# Patient Record
Sex: MALE | Race: WHITE | NOT HISPANIC OR LATINO | URBAN - METROPOLITAN AREA
[De-identification: names, ages, dates, MRNs, and addresses within clinical notes are randomized per-mention and may not be internally consistent; named-entity substitution may affect disease eponyms.]

---

## 2019-02-03 ENCOUNTER — INPATIENT (INPATIENT)
Facility: HOSPITAL | Age: 79
LOS: 0 days | Discharge: HOME | DRG: 872 | End: 2019-02-04
Attending: INTERNAL MEDICINE | Admitting: INTERNAL MEDICINE
Payer: MEDICARE

## 2019-02-03 VITALS
HEIGHT: 64 IN | SYSTOLIC BLOOD PRESSURE: 130 MMHG | HEART RATE: 104 BPM | DIASTOLIC BLOOD PRESSURE: 72 MMHG | RESPIRATION RATE: 15 BRPM | TEMPERATURE: 97 F | OXYGEN SATURATION: 100 % | WEIGHT: 164.91 LBS

## 2019-02-03 DIAGNOSIS — Z29.9 ENCOUNTER FOR PROPHYLACTIC MEASURES, UNSPECIFIED: ICD-10-CM

## 2019-02-03 DIAGNOSIS — E78.5 HYPERLIPIDEMIA, UNSPECIFIED: ICD-10-CM

## 2019-02-03 DIAGNOSIS — K40.90 UNILATERAL INGUINAL HERNIA, WITHOUT OBSTRUCTION OR GANGRENE, NOT SPECIFIED AS RECURRENT: ICD-10-CM

## 2019-02-03 DIAGNOSIS — A41.9 SEPSIS, UNSPECIFIED ORGANISM: ICD-10-CM

## 2019-02-03 DIAGNOSIS — I10 ESSENTIAL (PRIMARY) HYPERTENSION: ICD-10-CM

## 2019-02-03 DIAGNOSIS — K61.0 ANAL ABSCESS: ICD-10-CM

## 2019-02-03 LAB
ALBUMIN SERPL ELPH-MCNC: 2.9 G/DL — LOW (ref 3.3–5)
ALP SERPL-CCNC: 128 U/L — HIGH (ref 40–120)
ALT FLD-CCNC: 30 U/L — SIGNIFICANT CHANGE UP (ref 12–78)
ANION GAP SERPL CALC-SCNC: 9 MMOL/L — SIGNIFICANT CHANGE UP (ref 5–17)
APTT BLD: 32.1 SEC — SIGNIFICANT CHANGE UP (ref 28.5–37)
AST SERPL-CCNC: 27 U/L — SIGNIFICANT CHANGE UP (ref 15–37)
BASOPHILS # BLD AUTO: 0.03 K/UL — SIGNIFICANT CHANGE UP (ref 0–0.2)
BASOPHILS NFR BLD AUTO: 0.2 % — SIGNIFICANT CHANGE UP (ref 0–2)
BILIRUB SERPL-MCNC: 1 MG/DL — SIGNIFICANT CHANGE UP (ref 0.2–1.2)
BUN SERPL-MCNC: 21 MG/DL — SIGNIFICANT CHANGE UP (ref 7–23)
CALCIUM SERPL-MCNC: 8 MG/DL — LOW (ref 8.5–10.1)
CHLORIDE SERPL-SCNC: 105 MMOL/L — SIGNIFICANT CHANGE UP (ref 96–108)
CO2 SERPL-SCNC: 24 MMOL/L — SIGNIFICANT CHANGE UP (ref 22–31)
CREAT SERPL-MCNC: 1.1 MG/DL — SIGNIFICANT CHANGE UP (ref 0.5–1.3)
EOSINOPHIL # BLD AUTO: 0.02 K/UL — SIGNIFICANT CHANGE UP (ref 0–0.5)
EOSINOPHIL NFR BLD AUTO: 0.1 % — SIGNIFICANT CHANGE UP (ref 0–6)
GLUCOSE SERPL-MCNC: 142 MG/DL — HIGH (ref 70–99)
HCT VFR BLD CALC: 48.1 % — SIGNIFICANT CHANGE UP (ref 39–50)
HGB BLD-MCNC: 15.8 G/DL — SIGNIFICANT CHANGE UP (ref 13–17)
IMM GRANULOCYTES NFR BLD AUTO: 0.4 % — SIGNIFICANT CHANGE UP (ref 0–1.5)
INR BLD: 1.32 RATIO — HIGH (ref 0.88–1.16)
LACTATE SERPL-SCNC: 0.9 MMOL/L — SIGNIFICANT CHANGE UP (ref 0.7–2)
LIDOCAIN IGE QN: 157 U/L — SIGNIFICANT CHANGE UP (ref 73–393)
LYMPHOCYTES # BLD AUTO: 0.88 K/UL — LOW (ref 1–3.3)
LYMPHOCYTES # BLD AUTO: 5.2 % — LOW (ref 13–44)
MCHC RBC-ENTMCNC: 26.8 PG — LOW (ref 27–34)
MCHC RBC-ENTMCNC: 32.8 GM/DL — SIGNIFICANT CHANGE UP (ref 32–36)
MCV RBC AUTO: 81.5 FL — SIGNIFICANT CHANGE UP (ref 80–100)
MONOCYTES # BLD AUTO: 0.79 K/UL — SIGNIFICANT CHANGE UP (ref 0–0.9)
MONOCYTES NFR BLD AUTO: 4.6 % — SIGNIFICANT CHANGE UP (ref 2–14)
NEUTROPHILS # BLD AUTO: 15.24 K/UL — HIGH (ref 1.8–7.4)
NEUTROPHILS NFR BLD AUTO: 89.5 % — HIGH (ref 43–77)
PLATELET # BLD AUTO: 214 K/UL — SIGNIFICANT CHANGE UP (ref 150–400)
POTASSIUM SERPL-MCNC: 3.7 MMOL/L — SIGNIFICANT CHANGE UP (ref 3.5–5.3)
POTASSIUM SERPL-SCNC: 3.7 MMOL/L — SIGNIFICANT CHANGE UP (ref 3.5–5.3)
PROCALCITONIN SERPL-MCNC: 0.5 NG/ML — HIGH (ref 0–0.04)
PROT SERPL-MCNC: 6.6 G/DL — SIGNIFICANT CHANGE UP (ref 6–8.3)
PROTHROM AB SERPL-ACNC: 15.2 SEC — HIGH (ref 10–12.9)
RBC # BLD: 5.9 M/UL — HIGH (ref 4.2–5.8)
RBC # FLD: 13.3 % — SIGNIFICANT CHANGE UP (ref 10.3–14.5)
SODIUM SERPL-SCNC: 138 MMOL/L — SIGNIFICANT CHANGE UP (ref 135–145)
WBC # BLD: 17.03 K/UL — HIGH (ref 3.8–10.5)
WBC # FLD AUTO: 17.03 K/UL — HIGH (ref 3.8–10.5)

## 2019-02-03 PROCEDURE — 99285 EMERGENCY DEPT VISIT HI MDM: CPT

## 2019-02-03 PROCEDURE — 74177 CT ABD & PELVIS W/CONTRAST: CPT | Mod: 26

## 2019-02-03 PROCEDURE — 93010 ELECTROCARDIOGRAM REPORT: CPT

## 2019-02-03 RX ORDER — SIMVASTATIN 20 MG/1
1 TABLET, FILM COATED ORAL
Qty: 0 | Refills: 0 | COMMUNITY

## 2019-02-03 RX ORDER — SODIUM CHLORIDE 9 MG/ML
1000 INJECTION INTRAMUSCULAR; INTRAVENOUS; SUBCUTANEOUS ONCE
Qty: 0 | Refills: 0 | Status: COMPLETED | OUTPATIENT
Start: 2019-02-03 | End: 2019-02-03

## 2019-02-03 RX ORDER — VANCOMYCIN HCL 1 G
1500 VIAL (EA) INTRAVENOUS ONCE
Qty: 0 | Refills: 0 | Status: COMPLETED | OUTPATIENT
Start: 2019-02-03 | End: 2019-02-03

## 2019-02-03 RX ORDER — PIPERACILLIN AND TAZOBACTAM 4; .5 G/20ML; G/20ML
3.38 INJECTION, POWDER, LYOPHILIZED, FOR SOLUTION INTRAVENOUS ONCE
Qty: 0 | Refills: 0 | Status: COMPLETED | OUTPATIENT
Start: 2019-02-03 | End: 2019-02-03

## 2019-02-03 RX ORDER — HYDROMORPHONE HYDROCHLORIDE 2 MG/ML
0.5 INJECTION INTRAMUSCULAR; INTRAVENOUS; SUBCUTANEOUS ONCE
Qty: 0 | Refills: 0 | Status: DISCONTINUED | OUTPATIENT
Start: 2019-02-03 | End: 2019-02-03

## 2019-02-03 RX ORDER — SODIUM CHLORIDE 9 MG/ML
3 INJECTION INTRAMUSCULAR; INTRAVENOUS; SUBCUTANEOUS ONCE
Qty: 0 | Refills: 0 | Status: COMPLETED | OUTPATIENT
Start: 2019-02-03 | End: 2019-02-03

## 2019-02-03 RX ORDER — SIMVASTATIN 20 MG/1
20 TABLET, FILM COATED ORAL AT BEDTIME
Qty: 0 | Refills: 0 | Status: DISCONTINUED | OUTPATIENT
Start: 2019-02-03 | End: 2019-02-04

## 2019-02-03 RX ORDER — AMLODIPINE BESYLATE 2.5 MG/1
1 TABLET ORAL
Qty: 0 | Refills: 0 | COMMUNITY

## 2019-02-03 RX ORDER — DOCUSATE SODIUM 100 MG
100 CAPSULE ORAL THREE TIMES A DAY
Qty: 0 | Refills: 0 | Status: DISCONTINUED | OUTPATIENT
Start: 2019-02-03 | End: 2019-02-04

## 2019-02-03 RX ORDER — SENNA PLUS 8.6 MG/1
2 TABLET ORAL AT BEDTIME
Qty: 0 | Refills: 0 | Status: DISCONTINUED | OUTPATIENT
Start: 2019-02-03 | End: 2019-02-04

## 2019-02-03 RX ORDER — ENOXAPARIN SODIUM 100 MG/ML
40 INJECTION SUBCUTANEOUS EVERY 24 HOURS
Qty: 0 | Refills: 0 | Status: DISCONTINUED | OUTPATIENT
Start: 2019-02-03 | End: 2019-02-04

## 2019-02-03 RX ORDER — AMLODIPINE BESYLATE 2.5 MG/1
5 TABLET ORAL DAILY
Qty: 0 | Refills: 0 | Status: DISCONTINUED | OUTPATIENT
Start: 2019-02-03 | End: 2019-02-04

## 2019-02-03 RX ADMIN — SODIUM CHLORIDE 1000 MILLILITER(S): 9 INJECTION INTRAMUSCULAR; INTRAVENOUS; SUBCUTANEOUS at 15:30

## 2019-02-03 RX ADMIN — Medication 300 MILLIGRAM(S): at 15:50

## 2019-02-03 RX ADMIN — HYDROMORPHONE HYDROCHLORIDE 0.5 MILLIGRAM(S): 2 INJECTION INTRAMUSCULAR; INTRAVENOUS; SUBCUTANEOUS at 14:55

## 2019-02-03 RX ADMIN — PIPERACILLIN AND TAZOBACTAM 3.38 GRAM(S): 4; .5 INJECTION, POWDER, LYOPHILIZED, FOR SOLUTION INTRAVENOUS at 15:30

## 2019-02-03 RX ADMIN — SODIUM CHLORIDE 1000 MILLILITER(S): 9 INJECTION INTRAMUSCULAR; INTRAVENOUS; SUBCUTANEOUS at 16:50

## 2019-02-03 RX ADMIN — PIPERACILLIN AND TAZOBACTAM 200 GRAM(S): 4; .5 INJECTION, POWDER, LYOPHILIZED, FOR SOLUTION INTRAVENOUS at 14:55

## 2019-02-03 RX ADMIN — Medication 1500 MILLIGRAM(S): at 18:45

## 2019-02-03 RX ADMIN — SIMVASTATIN 20 MILLIGRAM(S): 20 TABLET, FILM COATED ORAL at 21:34

## 2019-02-03 RX ADMIN — Medication 100 MILLIGRAM(S): at 22:03

## 2019-02-03 RX ADMIN — SODIUM CHLORIDE 1000 MILLILITER(S): 9 INJECTION INTRAMUSCULAR; INTRAVENOUS; SUBCUTANEOUS at 14:30

## 2019-02-03 RX ADMIN — SODIUM CHLORIDE 3 MILLILITER(S): 9 INJECTION INTRAMUSCULAR; INTRAVENOUS; SUBCUTANEOUS at 14:15

## 2019-02-03 RX ADMIN — HYDROMORPHONE HYDROCHLORIDE 0.5 MILLIGRAM(S): 2 INJECTION INTRAMUSCULAR; INTRAVENOUS; SUBCUTANEOUS at 14:34

## 2019-02-03 NOTE — ED ADULT NURSE REASSESSMENT NOTE - NS ED NURSE REASSESS COMMENT FT1
In CT scan, while sliding from stretcher to CT table, the abscess on pt's buttock opened.  Wound area now much less reddened with copious amounts of purulent drainage out.  Area much less tender to palpation.

## 2019-02-03 NOTE — H&P ADULT - PROBLEM SELECTOR PLAN 1
- WBC 17.03,  on admission.  - Lactate 0.9.  - Patient remained mildly tachycardic in ED.  - s/p 2L NS, IV vancomycin, IV zosyn.   - Likely secondary to acute perianal abscess infection.  - Admit to [facility] for sepsis, perianal abscess.  - Follow up BCx, UCx.

## 2019-02-03 NOTE — H&P ADULT - PROBLEM SELECTOR PLAN 3
- Chronic, stable.  - Continue home Norvasc with hold parameters. - Incidental finding.  - CT abd/pelvis: Fat-containing bilateral inguinal hernias.  - Follow up outpatient with PMD.

## 2019-02-03 NOTE — H&P ADULT - PROBLEM SELECTOR PLAN 2
- Acute, draining perianal abscess.  - s/p IV vancomycin, IV zosyn in ED.  - Continue therapy with IV Augmentin.  - IV abx therapy with IV Augmentin.  - Colorectal surgeon Dr. Espitia consulted.   - Consider ID consult. - Acute, draining perianal abscess.  - CT abd/pelvis: There is a 1.7 x 3.2 x 3.1 cm left perianal abscess. No supralevator or ischiorectal fossae extension. No obvious fistula. No convincing focal bowel wall thickening.  - s/p IV vancomycin, IV zosyn in ED.  - Continue therapy with IV Augmentin.  - IV abx therapy with IV Augmentin.  - Colorectal surgeon Dr. Espitia consulted.   - Consider ID consult.

## 2019-02-03 NOTE — ED ADULT NURSE NOTE - OBJECTIVE STATEMENT
Pt A&Ox4, ambulatory to ED, c/o pain in rectal area.  Pt states that he has swelling and pain for the past 2 days with fever.  Pt has area of redness, firm swelling, and tenderness in under left buttock and rectal area. Pt A&Ox4, ambulatory to ED, c/o pain in rectal area.  Pt states that he has swelling and pain for the past 2 days with fever.  Pt has area of redness, firm swelling, and tenderness in under left buttock and rectal area.  At time of this initial assessment, pt has no other open wounds or non-blanchable redness to buttocks or sacral area.

## 2019-02-03 NOTE — H&P ADULT - NSHPREVIEWOFSYSTEMS_GEN_ALL_CORE
Constitutional: + fever, difficulty sitting, gluteal pain; denies chills, general malaise  HEENT: Denies sore throat, runny nose, blurry vision  Respiratory: Denies shortness of breath, dyspnea on exertion, cough  Cardiovascular: Denies chest pain, palpitations, edema  Gastrointestinal: Denies nausea, vomiting, diarrhea, constipation, abdominal pain  Genitourinary: Denies dysuria, hematuria  Skin/Breast: + left-sided perianal abscess with drainage  Musculoskeletal: Denies muscle pains, muscle weakness, joint pain or swelling  Neurologic: Denies syncope, loss of consciousness, headache, dizziness   ROS negative except as noted above

## 2019-02-03 NOTE — ED ADULT NURSE NOTE - ED STAT RN HANDOFF DETAILS
called [facility] for report, KEVON Pichardo to come to ED in 30 minutes called [facility] for report, KEVON Vasquez to come to ED in 30 minutes

## 2019-02-03 NOTE — ED PROVIDER NOTE - OBJECTIVE STATEMENT
Pt is a 77 yo male who presents to the ED with a cc of fever and gluteal pain.  PMHx of HTN, HLD.  Pt reports that he developed rectal pain approx 2 days ago and he states that the pain has progressively worsened making it difficult for him to sit.  He further reports associated fevers T max of 100.3 with and episode of nausea that has since resolved.  Denies chills, V/D/C, CP, SOB, abd pain, ext numbness or weakness.  Denies any similar episodes of pain.  He reports that he took Aleve around midnight but has not taken anything else for pain or fever.  He has not followed with his PMD for this.

## 2019-02-03 NOTE — H&P ADULT - PROBLEM SELECTOR PLAN 5
IMPROVE VTE Individual Risk Assessment          RISK                                                          Points  [  ] Previous VTE                                                3  [  ] Thrombophilia                                             2  [  ] Lower limb paralysis                                   2        (unable to hold up >15 seconds)    [  ] Current Cancer                                             2         (within 6 months)  [  ] Immobilization > 24 hrs                              1  [  ] ICU/CCU stay > 24 hours                             1  [ x ] Age > 60                                                         1    IMPROVE VTE Score: 1  Lovenox 40 subq daily. - Chronic, stable.  - Continue home statin.

## 2019-02-03 NOTE — H&P ADULT - PROBLEM SELECTOR PLAN 4
- Chronic, stable.  - Continue home statin. - Chronic, stable.  - Continue home Norvasc with hold parameters.

## 2019-02-03 NOTE — ED PROVIDER NOTE - PROGRESS NOTE DETAILS
Abscess has spontaneously opened at bedside and is draining with significant improvement in pain, and redness.  Spoke with Dr. Espitia who will see pt in am.  Will admit for continued abx

## 2019-02-03 NOTE — ED PROVIDER NOTE - MEDICAL DECISION MAKING DETAILS
Pt is a 79 yo male who presents to the ED with a cc of fever and gluteal pain.  PMHx of HTN, HLD.  Pt reports that he developed rectal pain approx 2 days ago and he states that the pain has progressively worsened making it difficult for him to sit.  Concern for possible perianal vs perirectal abscess.  Will obtain screening septic work up, EKG, CT abd/pelvis.  Will begin abx

## 2019-02-03 NOTE — H&P ADULT - HISTORY OF PRESENT ILLNESS
Patient is a 78 year old male with PMH of HTN, HLD who presents to the ED with complaints of perianal pain and fever. Patient states he began to feel unwell on Friday states that the pain has progressively worsened making it difficult for him to sit. He reports max temp of 100.3 and had an episode of nausea that has resolved. He denies chills, vomiting, abdominal pain, diarrhea, constipation, chest pain. He states he took Aleve around midnight but has not taken anything else for pain or fever.    Vitals in the ED  T(C): 37.3 (03 Feb 2019 19:55), Max: 37.3 (03 Feb 2019 19:55)  T(F): 99.1 (03 Feb 2019 19:55), Max: 99.1 (03 Feb 2019 19:55)  HR: 90 (03 Feb 2019 19:55) (87 - 104)  BP: 117/65 (03 Feb 2019 19:55) (117/65 - 130/72)  RR: 16 (03 Feb 2019 19:55) (15 - 16)  SpO2: 96% (03 Feb 2019 19:55) (96% - 100%)    Pertinent labs: 17.03, glucose 142, Ca 8, alk phos 128, procal 0.5.    CT abdomen/pelvis: There is a 1.7 x 3.2 x 3.1 cm left perianal abscess. No supralevator or ischiorectal fossae extension. No obvious fistula. No convincing focal bowel wall thickening.    In the ED, patient received: IV vancomycin, IV zosyn, Dilaudid 2L NS.     Patient admitted to  for sepsis, perianal abscess.

## 2019-02-03 NOTE — ED PROVIDER NOTE - PHYSICAL EXAMINATION
Left gluteal region: diffuse erythema with extensive induration and diffuse TTP to region, pt refusing rectal at this time secondary to pain

## 2019-02-03 NOTE — ED ADULT NURSE REASSESSMENT NOTE - NS ED NURSE REASSESS COMMENT FT1
Pt placed on hospital bed for comfort, moved to T4 and entire area made private, pt given 4 pillows, warm blanket.  Apologized again for mix-up with bed assignment.  Call bell within reach.

## 2019-02-03 NOTE — H&P ADULT - NSHPSOCIALHISTORY_GEN_ALL_CORE
Patient is  and lives at home with wife.   Ambulates without assistance.  Denies tobacco, alcohol, or drug use.

## 2019-02-03 NOTE — H&P ADULT - PROBLEM SELECTOR PLAN 6
IMPROVE VTE Individual Risk Assessment          RISK                                                          Points  [  ] Previous VTE                                                3  [  ] Thrombophilia                                             2  [  ] Lower limb paralysis                                   2        (unable to hold up >15 seconds)    [  ] Current Cancer                                             2         (within 6 months)  [  ] Immobilization > 24 hrs                              1  [  ] ICU/CCU stay > 24 hours                             1  [ x ] Age > 60                                                         1    IMPROVE VTE Score: 1  Lovenox 40 subq daily.

## 2019-02-03 NOTE — ED ADULT NURSE NOTE - ED STAT RN HANDOFF DETAILS 2
RN Pedro to ED, bedside report given but due to hospital needs, pt's bed cancelled; apologized to pt, explained situation

## 2019-02-03 NOTE — H&P ADULT - NSHPPHYSICALEXAM_GEN_ALL_CORE
Physical Exam:  General: well-developed, well-nourished, NAD  HEENT: normocephalic, atraumatic, EOMI, moist mucous membranes   Neck: supple, non-tender, no masses  Neurology: AAOx3, sensation intact  Respiratory: clear to auscultation bilaterally; no wheezes, rhonchi, or rales  CV: regular rate and rhythm, soft S1/S2, no murmurs, rubs, or gallops  Abdominal: soft, non-tender, non-distended, bowel sounds present   Extremities: no clubbing, cyanosis, or edema  Musculoskeletal: no joint erythema or warmth, no joint swelling   Skin: warm, dry, normal color; left perianal draining abscess

## 2019-02-03 NOTE — ED ADULT NURSE NOTE - NSIMPLEMENTINTERV_GEN_ALL_ED
Implemented All Universal Safety Interventions:  [place] to call system. Call bell, personal items and telephone within reach. Instruct patient to call for assistance. Room bathroom lighting operational. Non-slip footwear when patient is off stretcher. Physically safe environment: no spills, clutter or unnecessary equipment. Stretcher in lowest position, wheels locked, appropriate side rails in place.

## 2019-02-04 ENCOUNTER — TRANSCRIPTION ENCOUNTER (OUTPATIENT)
Age: 79
End: 2019-02-04

## 2019-02-04 VITALS
RESPIRATION RATE: 16 BRPM | HEART RATE: 77 BPM | DIASTOLIC BLOOD PRESSURE: 71 MMHG | TEMPERATURE: 98 F | SYSTOLIC BLOOD PRESSURE: 120 MMHG | OXYGEN SATURATION: 94 %

## 2019-02-04 DIAGNOSIS — E87.6 HYPOKALEMIA: ICD-10-CM

## 2019-02-04 LAB
ALBUMIN SERPL ELPH-MCNC: 2.8 G/DL — LOW (ref 3.3–5)
ALP SERPL-CCNC: 155 U/L — HIGH (ref 40–120)
ALT FLD-CCNC: 63 U/L — SIGNIFICANT CHANGE UP (ref 12–78)
ANION GAP SERPL CALC-SCNC: 7 MMOL/L — SIGNIFICANT CHANGE UP (ref 5–17)
APPEARANCE UR: ABNORMAL
AST SERPL-CCNC: 79 U/L — HIGH (ref 15–37)
BILIRUB SERPL-MCNC: 0.6 MG/DL — SIGNIFICANT CHANGE UP (ref 0.2–1.2)
BILIRUB UR-MCNC: ABNORMAL
BUN SERPL-MCNC: 14 MG/DL — SIGNIFICANT CHANGE UP (ref 7–23)
CALCIUM SERPL-MCNC: 8.3 MG/DL — LOW (ref 8.5–10.1)
CHLORIDE SERPL-SCNC: 106 MMOL/L — SIGNIFICANT CHANGE UP (ref 96–108)
CO2 SERPL-SCNC: 26 MMOL/L — SIGNIFICANT CHANGE UP (ref 22–31)
COLOR SPEC: YELLOW — SIGNIFICANT CHANGE UP
CREAT SERPL-MCNC: 0.77 MG/DL — SIGNIFICANT CHANGE UP (ref 0.5–1.3)
DIFF PNL FLD: ABNORMAL
GLUCOSE SERPL-MCNC: 132 MG/DL — HIGH (ref 70–99)
GLUCOSE UR QL: NEGATIVE — SIGNIFICANT CHANGE UP
HCT VFR BLD CALC: 39.1 % — SIGNIFICANT CHANGE UP (ref 39–50)
HGB BLD-MCNC: 12.8 G/DL — LOW (ref 13–17)
KETONES UR-MCNC: ABNORMAL
LEUKOCYTE ESTERASE UR-ACNC: ABNORMAL
MAGNESIUM SERPL-MCNC: 2.3 MG/DL — SIGNIFICANT CHANGE UP (ref 1.6–2.6)
MCHC RBC-ENTMCNC: 26.7 PG — LOW (ref 27–34)
MCHC RBC-ENTMCNC: 32.7 GM/DL — SIGNIFICANT CHANGE UP (ref 32–36)
MCV RBC AUTO: 81.6 FL — SIGNIFICANT CHANGE UP (ref 80–100)
NITRITE UR-MCNC: NEGATIVE — SIGNIFICANT CHANGE UP
NRBC # BLD: 0 /100 WBCS — SIGNIFICANT CHANGE UP (ref 0–0)
PH UR: 6.5 — SIGNIFICANT CHANGE UP (ref 5–8)
PHOSPHATE SERPL-MCNC: 2.2 MG/DL — LOW (ref 2.5–4.5)
PLATELET # BLD AUTO: 199 K/UL — SIGNIFICANT CHANGE UP (ref 150–400)
POTASSIUM SERPL-MCNC: 3.4 MMOL/L — LOW (ref 3.5–5.3)
POTASSIUM SERPL-SCNC: 3.4 MMOL/L — LOW (ref 3.5–5.3)
PROT SERPL-MCNC: 6.5 G/DL — SIGNIFICANT CHANGE UP (ref 6–8.3)
PROT UR-MCNC: 150 MG/DL
RBC # BLD: 4.79 M/UL — SIGNIFICANT CHANGE UP (ref 4.2–5.8)
RBC # FLD: 13.2 % — SIGNIFICANT CHANGE UP (ref 10.3–14.5)
SODIUM SERPL-SCNC: 139 MMOL/L — SIGNIFICANT CHANGE UP (ref 135–145)
SP GR SPEC: 1.02 — SIGNIFICANT CHANGE UP (ref 1.01–1.02)
UROBILINOGEN FLD QL: 8
WBC # BLD: 10.77 K/UL — HIGH (ref 3.8–10.5)
WBC # FLD AUTO: 10.77 K/UL — HIGH (ref 3.8–10.5)

## 2019-02-04 PROCEDURE — 83690 ASSAY OF LIPASE: CPT

## 2019-02-04 PROCEDURE — 86850 RBC ANTIBODY SCREEN: CPT

## 2019-02-04 PROCEDURE — 86901 BLOOD TYPING SEROLOGIC RH(D): CPT

## 2019-02-04 PROCEDURE — 80053 COMPREHEN METABOLIC PANEL: CPT

## 2019-02-04 PROCEDURE — 81001 URINALYSIS AUTO W/SCOPE: CPT

## 2019-02-04 PROCEDURE — 96365 THER/PROPH/DIAG IV INF INIT: CPT | Mod: XU

## 2019-02-04 PROCEDURE — 87086 URINE CULTURE/COLONY COUNT: CPT

## 2019-02-04 PROCEDURE — 96375 TX/PRO/DX INJ NEW DRUG ADDON: CPT

## 2019-02-04 PROCEDURE — 84145 PROCALCITONIN (PCT): CPT

## 2019-02-04 PROCEDURE — 93005 ELECTROCARDIOGRAM TRACING: CPT

## 2019-02-04 PROCEDURE — 96367 TX/PROPH/DG ADDL SEQ IV INF: CPT

## 2019-02-04 PROCEDURE — 84100 ASSAY OF PHOSPHORUS: CPT

## 2019-02-04 PROCEDURE — 85730 THROMBOPLASTIN TIME PARTIAL: CPT

## 2019-02-04 PROCEDURE — 85027 COMPLETE CBC AUTOMATED: CPT

## 2019-02-04 PROCEDURE — 83735 ASSAY OF MAGNESIUM: CPT

## 2019-02-04 PROCEDURE — 36415 COLL VENOUS BLD VENIPUNCTURE: CPT

## 2019-02-04 PROCEDURE — 99285 EMERGENCY DEPT VISIT HI MDM: CPT | Mod: 25

## 2019-02-04 PROCEDURE — 74177 CT ABD & PELVIS W/CONTRAST: CPT

## 2019-02-04 PROCEDURE — 87040 BLOOD CULTURE FOR BACTERIA: CPT

## 2019-02-04 PROCEDURE — 83605 ASSAY OF LACTIC ACID: CPT

## 2019-02-04 PROCEDURE — 85610 PROTHROMBIN TIME: CPT

## 2019-02-04 PROCEDURE — 86900 BLOOD TYPING SEROLOGIC ABO: CPT

## 2019-02-04 RX ORDER — METRONIDAZOLE 500 MG
1 TABLET ORAL
Qty: 21 | Refills: 0 | OUTPATIENT
Start: 2019-02-04

## 2019-02-04 RX ORDER — LACTOBACILLUS ACIDOPHILUS 100MM CELL
1 CAPSULE ORAL
Qty: 21 | Refills: 0 | OUTPATIENT
Start: 2019-02-04 | End: 2019-02-10

## 2019-02-04 RX ORDER — CIPROFLOXACIN LACTATE 400MG/40ML
400 VIAL (ML) INTRAVENOUS EVERY 12 HOURS
Qty: 0 | Refills: 0 | Status: DISCONTINUED | OUTPATIENT
Start: 2019-02-04 | End: 2019-02-04

## 2019-02-04 RX ORDER — POTASSIUM CHLORIDE 20 MEQ
40 PACKET (EA) ORAL ONCE
Qty: 0 | Refills: 0 | Status: COMPLETED | OUTPATIENT
Start: 2019-02-04 | End: 2019-02-04

## 2019-02-04 RX ORDER — METRONIDAZOLE 500 MG
TABLET ORAL
Qty: 0 | Refills: 0 | Status: DISCONTINUED | OUTPATIENT
Start: 2019-02-04 | End: 2019-02-04

## 2019-02-04 RX ORDER — METRONIDAZOLE 500 MG
500 TABLET ORAL ONCE
Qty: 0 | Refills: 0 | Status: COMPLETED | OUTPATIENT
Start: 2019-02-04 | End: 2019-02-04

## 2019-02-04 RX ORDER — MOXIFLOXACIN HYDROCHLORIDE TABLETS, 400 MG 400 MG/1
1 TABLET, FILM COATED ORAL
Qty: 14 | Refills: 0 | OUTPATIENT
Start: 2019-02-04

## 2019-02-04 RX ORDER — LACTOBACILLUS ACIDOPHILUS 100MM CELL
1 CAPSULE ORAL
Qty: 0 | Refills: 0 | Status: DISCONTINUED | OUTPATIENT
Start: 2019-02-04 | End: 2019-02-04

## 2019-02-04 RX ORDER — METRONIDAZOLE 500 MG
500 TABLET ORAL EVERY 8 HOURS
Qty: 0 | Refills: 0 | Status: DISCONTINUED | OUTPATIENT
Start: 2019-02-04 | End: 2019-02-04

## 2019-02-04 RX ADMIN — Medication 100 MILLIGRAM(S): at 05:37

## 2019-02-04 RX ADMIN — Medication 100 MILLIGRAM(S): at 16:01

## 2019-02-04 RX ADMIN — Medication 100 MILLIGRAM(S): at 10:54

## 2019-02-04 RX ADMIN — Medication 200 MILLIGRAM(S): at 11:53

## 2019-02-04 RX ADMIN — Medication 40 MILLIEQUIVALENT(S): at 12:46

## 2019-02-04 RX ADMIN — ENOXAPARIN SODIUM 40 MILLIGRAM(S): 100 INJECTION SUBCUTANEOUS at 05:37

## 2019-02-04 RX ADMIN — AMLODIPINE BESYLATE 5 MILLIGRAM(S): 2.5 TABLET ORAL at 05:36

## 2019-02-04 NOTE — PATIENT PROFILE ADULT - ABILITY TO HEAR (WITH HEARING AID OR HEARING APPLIANCE IF NORMALLY USED):
Mildly to Moderately Impaired: difficulty hearing in some environments or speaker may need to increase volume or speak distinctly/Mild [place] right

## 2019-02-04 NOTE — DISCHARGE NOTE ADULT - CARE PROVIDER_API CALL
Dale Espitia)  ColonRectal Surgery; Surgery  [address] [facility] [address] [place] [address]  Phone: [phone]  Fax: [phone]    Vidal Ryan)  [address] [facility], [place] [address]  Phone: [phone]  Fax: (

## 2019-02-04 NOTE — PROGRESS NOTE ADULT - ASSESSMENT
Patient is a 78 year old male with PMH of HTN, HLD who presents to the ED with complaints of perianal pain and fever. Admitted to [facility] for sepsis and perianal abscess.

## 2019-02-04 NOTE — DISCHARGE NOTE ADULT - MEDICATION SUMMARY - MEDICATIONS TO TAKE
I will START or STAY ON the medications listed below when I get home from the hospital:    Flagyl 500 mg oral tablet  -- 1 tab(s) by mouth every 8 hours   -- Do not drink alcoholic beverages when taking this medication.  Finish all this medication unless otherwise directed by prescriber.  May discolor urine or feces.    -- Indication: For Perianal abscess    simvastatin 20 mg oral tablet  -- 1 tab(s) by mouth once a day (at bedtime)  -- Indication: For Hyperlipidemia    Norvasc 5 mg oral tablet  -- 1 tab(s) by mouth once a day  -- Indication: For HTN (hypertension)    Acidophilus oral capsule  -- 1 cap(s) by mouth 3 times a day (with meals)   -- Indication: For Need for prophylactic measure    Cipro 500 mg oral tablet  -- 1 tab(s) by mouth 2 times a day   -- Avoid prolonged or excessive exposure to direct and/or artificial sunlight while taking this medication.  Check with your doctor before becoming pregnant.  Do not take dairy products, antacids, or iron preparations within one hour of this medication.  Finish all this medication unless otherwise directed by prescriber.  Medication should be taken with plenty of water.    -- Indication: For Perianal abscess

## 2019-02-04 NOTE — DISCHARGE NOTE ADULT - CARE PLAN
Principal Discharge DX:	Perianal abscess  Goal:	improvement in symptoms  Assessment and plan of treatment:	Your cat scan showed a left perianal abscess. You were started on IV antibiotics and transitioned to oral medications. Continue oral medications. Follow up with colorectal surgeon, Dr. Espitia, for continued management in one week. Follow up with your PCP, Dr. Ryan, within one week.  Secondary Diagnosis:	Inguinal hernia  Assessment and plan of treatment:	Your cat scan showed Fat-containing bilateral inguinal hernias. Follow up with your primary care doctor.  Secondary Diagnosis:	HTN (hypertension)  Assessment and plan of treatment:	continue home medications  Secondary Diagnosis:	Hyperlipidemia  Assessment and plan of treatment:	continue home medication Principal Discharge DX:	Perianal abscess  Goal:	improvement in symptoms  Assessment and plan of treatment:	Your cat scan showed a left perianal abscess. You were started on IV antibiotics and transitioned to oral medications. Continue oral Cipro 2 times a day and Flagyl 3 times a day and take Probiotic 3 times a day with meals. Follow up with colorectal surgeon, Dr. Espitia, for continued management in one week. Follow up with your PCP, Dr. Ryan, within one week.  Secondary Diagnosis:	Inguinal hernia  Assessment and plan of treatment:	Your cat scan showed Fat-containing bilateral inguinal hernias. Follow up with your primary care doctor.  Secondary Diagnosis:	HTN (hypertension)  Assessment and plan of treatment:	continue home medications  Secondary Diagnosis:	Hyperlipidemia  Assessment and plan of treatment:	continue home medication

## 2019-02-04 NOTE — DISCHARGE NOTE ADULT - PATIENT PORTAL LINK FT
You can access the [facility] Patient Portal, offered by [facility], by registering with the following website: [email or web address]

## 2019-02-04 NOTE — PROGRESS NOTE ADULT - PROBLEM SELECTOR PLAN 3
- Incidental finding.  - CT abd/pelvis: Fat-containing bilateral inguinal hernias.  - Follow up outpatient with PMD. - Potassium 3.4 this AM  - Supplemented

## 2019-02-04 NOTE — PROGRESS NOTE ADULT - PROBLEM SELECTOR PLAN 1
- WBC 17.03-->10.77  - Lactate 0.9  - Tachycardia improved  - s/p 2L NS, IV vancomycin, IV zosyn in ED   - On Cipro/ Flagyl   - Likely secondary to acute perianal abscess infection.  - f/u BCx, UCx.

## 2019-02-04 NOTE — PROGRESS NOTE ADULT - SUBJECTIVE AND OBJECTIVE BOX
Patient is a 78y old  Male who presents with a chief complaint of Sepsis, perianal abscess (2019 20:57)      INTERVAL HPI/ OVERNIGHT EVENTS: Patient admitted to [facility]. Reports no acute complaints this morning. Admits perianal pain, difficulty sitting. Denies headaches, dizziness, chest pain, palpitations, nausea, vomiting, diarrhea, constipation, abdominal pain.       T(C): 36.9 (19 @ 08:27), Max: 37.3 (19 @ 19:55)  HR: 77 (19 @ 08:27) (77 - 104)  BP: 116/69 (19 @ 08:27) (112/73 - 130/72)  RR: 18 (19 @ 08:27) (15 - 18)  SpO2: 95% (19 @ 08:27) (95% - 100%)  Wt(kg): --  I&O's Summary    2019 07:01  -  2019 07:00  --------------------------------------------------------  IN: 2000 mL / OUT: 0 mL / NET: 2000 mL        LABS:                        12.8   10.77 )-----------( 199      ( 2019 07:04 )             39.1     02-04    139  |  106  |  14  ----------------------------<  132<H>  3.4<L>   |  26  |  0.77    Ca    8.3<L>      2019 07:04  Phos  2.2     02-04  Mg     2.3     02-04    TPro  6.5  /  Alb  2.8<L>  /  TBili  0.6  /  DBili  x   /  AST  79<H>  /  ALT  63  /  AlkPhos  155<H>  02-04    PT/INR - ( 2019 14:32 )   PT: 15.2 sec;   INR: 1.32 ratio         PTT - ( 2019 14:32 )  PTT:32.1 sec  Urinalysis Basic - ( 2019 00:42 )    Color: Yellow / Appearance: Slightly Turbid / S.020 / pH: x  Gluc: x / Ketone: Trace  / Bili: Small / Urobili: 8   Blood: x / Protein: 150 mg/dL / Nitrite: Negative   Leuk Esterase: Trace / RBC: 3-5 /HPF / WBC 3-5   Sq Epi: x / Non Sq Epi: Few / Bacteria: Occasional      CAPILLARY BLOOD GLUCOSE            Urinalysis Basic - ( 2019 00:42 )    Color: Yellow / Appearance: Slightly Turbid / S.020 / pH: x  Gluc: x / Ketone: Trace  / Bili: Small / Urobili: 8   Blood: x / Protein: 150 mg/dL / Nitrite: Negative   Leuk Esterase: Trace / RBC: 3-5 /HPF / WBC 3-5   Sq Epi: x / Non Sq Epi: Few / Bacteria: Occasional        MEDICATIONS  (STANDING):  amLODIPine   Tablet 5 milliGRAM(s) Oral daily  ciprofloxacin   IVPB 400 milliGRAM(s) IV Intermittent every 12 hours  docusate sodium 100 milliGRAM(s) Oral three times a day  enoxaparin Injectable 40 milliGRAM(s) SubCutaneous every 24 hours  lactobacillus acidophilus 1 Tablet(s) Oral two times a day with meals  metroNIDAZOLE  IVPB 500 milliGRAM(s) IV Intermittent once  metroNIDAZOLE  IVPB 500 milliGRAM(s) IV Intermittent every 8 hours  metroNIDAZOLE  IVPB      potassium chloride    Tablet ER 40 milliEquivalent(s) Oral once  simvastatin 20 milliGRAM(s) Oral at bedtime    MEDICATIONS  (PRN):  senna 2 Tablet(s) Oral at bedtime PRN Constipation      ROS:   Constitutional: Admits difficulty sitting, gluteal pain; denies fevers, chills, weakness  	HEENT: Denies sore throat, nasal discharge, blurry vision  	Respiratory: Denies shortness of breath, dyspnea on exertion, cough  	Cardiovascular: Denies chest pain, palpitations, LE edema  	Gastrointestinal: Denies nausea, vomiting, diarrhea, constipation, abdominal pain  	Genitourinary: Denies dysuria, hematuria  	Skin/Breast: Admits perianal abscess   	Musculoskeletal: Denies muscle pains, muscle weakness, joint pain or swelling  	Neurologic: Denies syncope, loss of consciousness, headache, dizziness   ROS negative except as noted above    RADIOLOGY & ADDITIONAL TESTS:    Consultant(s) Notes Reviewed:  [ x] YES  [ ] NO    PHYSICAL EXAM:  	General: well-developed, well-nourished, NAD, lying in bed comfortably   	HEENT: NCAT. EOMI, moist mucous membranes   	Neck: supple, non-tender, no masses  	Neurology: AAOx3, sensation intact  	Respiratory: clear to auscultation bilaterally; no wheezes, rhonchi, or rales  	CV: regular rate and rhythm, soft S1/S2, no murmurs, rubs, or gallops  	Abdominal: soft, non-tender, non-distended, bowel sounds present   	Extremities: no clubbing, cyanosis, or edema  	Musculoskeletal: no joint erythema or warmth, no joint swelling   Skin: warm, dry, normal color; moderate sized left perianal abscess draining yellow pus, mildly ttp.     Care Discussed with Consultants/Other Providers [ x] YES  [ ] NO

## 2019-02-04 NOTE — DISCHARGE NOTE ADULT - PLAN OF CARE
improvement in symptoms Your cat scan showed a left perianal abscess. You were started on IV antibiotics and transitioned to oral medications. Continue oral medications. Follow up with colorectal surgeon, Dr. Espitia, for continued management in one week. Follow up with your PCP, Dr. Ryan, within one week. Your cat scan showed Fat-containing bilateral inguinal hernias. Follow up with your primary care doctor. continue home medications continue home medication Your cat scan showed a left perianal abscess. You were started on IV antibiotics and transitioned to oral medications. Continue oral Cipro 2 times a day and Flagyl 3 times a day and take Probiotic 3 times a day with meals. Follow up with colorectal surgeon, Dr. Espitia, for continued management in one week. Follow up with your PCP, Dr. Ryan, within one week.

## 2019-02-04 NOTE — PATIENT PROFILE ADULT - VISION (WITH CORRECTIVE LENSES IF THE PATIENT USUALLY WEARS THEM):
wear glasses all the time/Normal vision: sees adequately in most situations; can see medication labels, newsprint

## 2019-02-04 NOTE — DISCHARGE NOTE ADULT - PROVIDER TOKENS
TOKEN:'[id number]',FREE:[LAST:[Connor],FIRST:[Vidal (MD)],PHONE:[phone]:[(   )    -],ADDRESS:[[address] [place] [address]]]

## 2019-02-04 NOTE — CONSULT NOTE ADULT - ASSESSMENT
Dx Rectal abscess draining  Continue antibiotics, if induration improves then d/c tomorrow and follow in office in 1 week for possible further treatment for abscess/ possible fistula  will follow in hospital

## 2019-02-04 NOTE — PROGRESS NOTE ADULT - PROBLEM SELECTOR PLAN 2
- Acute, draining perianal abscess.  - CT abd/pelvis: There is a 1.7 x 3.2 x 3.1 cm left perianal abscess. No supralevator or ischiorectal fossae extension. No obvious fistula. No convincing focal bowel wall thickening.  - s/p IV vancomycin, IV zosyn in ED.  - Continue antibiotics with Cipro/ Flagyl  - Colorectal surgeon Dr. Espitia consulted.

## 2019-02-04 NOTE — CONSULT NOTE ADULT - SUBJECTIVE AND OBJECTIVE BOX
78 year old male with a three day history of rectal pain.  Patient had a similar episode 15 years ago.  Patient has no pain now.  He has not had a colonoscopy.    Vitals in the ED  T(C): 37.3 (03 Feb 2019 19:55), Max: 37.3 (03 Feb 2019 19:55)  T(F): 99.1 (03 Feb 2019 19:55), Max: 99.1 (03 Feb 2019 19:55)  HR: 90 (03 Feb 2019 19:55) (87 - 104)  BP: 117/65 (03 Feb 2019 19:55) (117/65 - 130/72)  RR: 16 (03 Feb 2019 19:55) (15 - 16)  SpO2: 96% (03 Feb 2019 19:55) (96% - 100%)    Pertinent labs: 17.03, glucose 142, Ca 8, alk phos 128, procal 0.5.    CT abdomen/pelvis: There is a 1.7 x 3.2 x 3.1 cm left perianal abscess. No supralevator or ischiorectal fossae extension. No obvious fistula. No convincing focal bowel wall thickening.    In the ED, patient received: IV vancomycin, IV zosyn, Dilaudid 2L NS.     Patient admitted to  for sepsis, perianal abscess.      Review of Systems:  Review of Systems: Constitutional: + fever, difficulty sitting, gluteal pain; denies chills, general malaise  HEENT: Denies sore throat, runny nose, blurry vision  Respiratory: Denies shortness of breath, dyspnea on exertion, cough  Cardiovascular: Denies chest pain, palpitations, edema  Gastrointestinal: Denies nausea, vomiting, diarrhea, constipation, abdominal pain  Genitourinary: Denies dysuria, hematuria  Skin/Breast: + left-sided perianal abscess with drainage  Musculoskeletal: Denies muscle pains, muscle weakness, joint pain or swelling  Neurologic: Denies syncope, loss of consciousness, headache, dizziness  ROS negative except as noted above	      Allergies and Intolerances:        Allergies:  	No Known Allergies:     Home Medications:   * Patient Currently Takes Medications as of 03-Feb-2019 13:46 documented in Structured Notes  · 	Norvasc 5 mg oral tablet: 1 tab(s) orally once a day, Last Dose Taken:    · 	simvastatin 20 mg oral tablet: 1 tab(s) orally once a day (at bedtime), Last Dose Taken:      .    Patient History:    Past Medical History:  HTN (hypertension)    Hyperlipidemia.     Past Surgical History:  No significant past surgical history.     Family History:  No pertinent family history in first degree relatives.     Social History:  Social History (marital status, living situation, occupation, tobacco use, alcohol and drug use, and sexual history): Patient is  and lives at home with wife.   Ambulates without assistance. Denies tobacco, alcohol, or drug use.	     Tobacco Screening:  · Core Measure Site	Yes	  · Has the patient used tobacco in the past 30 days?	No	    Risk Assessment:    Present on Admission:  Deep Venous Thrombosis	no	  Pulmonary Embolus	no	  Urinary Catheter	no	  Central Venous Catheter/PICC Line	no	  Surgical Site Incision	no	  Pressure Ulcer(s)	no	     Heart Failure:  Does this patient have a history of or has been diagnosed with heart failure? no.       Physical Exam:  Physical Exam: Physical Exam:  General: well-developed, well-nourished, NAD  HEENT: normocephalic, atraumatic, EOMI, moist mucous membranes   Neck: supple, non-tender, no masses  Neurology: AAOx3, sensation intact  Respiratory: clear to auscultation bilaterally; no wheezes, rhonchi, or rales  CV: regular rate and rhythm, soft S1/S2, no murmurs, rubs, or gallops  Abdominal: soft, non-tender, non-distended, bowel sounds present rectal area indurated minimal erthyma and minimal drainage.Still indurated.   Extremities: no clubbing, cyanosis, or edema  Musculoskeletal: no joint erythema or warmth, no joint swelling  Skin: warm, dry, normal color; left perianal draining abscess	      Laboratory:   Blood Bank:	    03-Feb-2019 14:32, Antibody Screen Interpretation	  Antibody Screen: NEG	    03-Feb-2019 14:32, Type + Screen	  ABO RH Interpretation: A POS	  General Chemistry:	    03-Feb-2019 16:06, Comprehensive Metabolic Panel	  Sodium, Serum: 138, [135 - 145 mmol/L]	  Potassium, Serum: 3.7, [3.5 - 5.3 mmol/L]	  Chloride, Serum: 105, [96 - 108 mmol/L]	  Carbon Dioxide, Serum: 24, [22 - 31 mmol/L]	  Anion Gap, Serum: 9, [5 - 17 mmol/L]	  Blood Urea Nitrogen, Serum: 21, [7 - 23 mg/dL]	  Creatinine, Serum: 1.10, [0.50 - 1.30 mg/dL]	  Glucose, Serum:    142, [70 - 99 mg/dL]	  Calcium, Total Serum:    8.0, [8.5 - 10.1 mg/dL]	  Protein Total, Serum: 6.6, [6.0 - 8.3 g/dL]	  Albumin, Serum:    2.9, [3.3 - 5.0 g/dL]	  Bilirubin Total, Serum: 1.0, [0.2 - 1.2 mg/dL]	  Alkaline Phosphatase, Serum:    128, [40 - 120 U/L]	  Aspartate Aminotransferase (AST/SGOT): 27, [15 - 37 U/L]	  Alanine Aminotransferase (ALT/SGPT): 30, [12 - 78 U/L]	  eGFR if Non : 64, [>=60 mL/min/1.73M2], Interpretative comment  The units for eGFR are mL/min/1.73M2 (normalized body surface area). The  eGFR is calculated from a serum creatinine using the CKD-EPI equation.  Other variables required for calculation are race, age and sex. Among  patients with chronic kidney disease (CKD), the eGFR is useful in  determining the stage of disease according to KDOQI CKD classification.  All eGFR results are reported numerically with the following  interpretation.          GFR                    With                 Without     (ml/min/1.73 m2)    Kidney Damage       Kidney Damage        >= 90                    Stage 1                     Normal        60-89                    Stage 2                     Decreased GFR        30-59     Stage 3                     Stage 3        15-29                    Stage 4                     Stage 4        < 15                      Stage 5                     Stage 5  Each stage of CKD assumes that the associated GFR level has been in  effect for at least 3 months. Determination of stages one and two (with  eGFR > 59 ml/min/m2) requires estimation of kidney damage for at least 3  months as defined by structural or functional abnormalities.  Limitations: All estimates of GFR will be less accurate for patients at  extremes of muscle mass (including but not limited to frail elderly,  critically ill, or cancer patients), those with unusual diets, and those  with conditions associated with reduced secretion or extrarenal  elimination of creatinine. The eGFR equation is not recommended for use  in patients with unstable creatinine levels.	  eGFR if : 74, [>=60 mL/min/1.73M2]	    03-Feb-2019 16:06, Lipase, Serum	  Lipase, Serum: 157, [73 - 393 U/L]	    03-Feb-2019 16:06, Procalcitonin, Serum	  Procalcitonin, Serum:    0.50, [0.00 - 0.04 ng/mL], Procalcitonin (PCT) Interpretation (ng/mL) - Diagnosis of systemic  bacterial infection/sepsis  PCT < 0.5: Systemic infection (sepsis) is not likely and risk for  progression to severe systemic infection is low. Local bacterial  infection is possible. If early sepsis is suspected clinically, PCT  should be re-assessed in 6-24 hours.  PCT >/= 0.5 but < 2.0: Systemic infection (sepsis) is possible, but other  conditions are known to elevate PCT as well. Moderate risk for  progression to severe systemic infection. The patient should be closely  monitored both clinically and by re-assessing PCT within 6-24 hours.  PCT >/= 2.0 but < 10.0: Systemic infection (sepsis) is likely, unless  other causes are known. High risk of progression to severe systemic  infection (severe sepsis/septic shock).  PCT >/= 10.0: Important systemic inflammatory response, almost  exclusively due to severe bacterial sepsis or septic shock. High  likelihood of severe sepsis or septic shock.	    03-Feb-2019 16:47, Lactate, Blood	  Lactate, Blood: 0.9, [0.7 - 2.0 mmol/L]	  Coagulation:	    03-Feb-2019 14:32, Activated Partial Thromboplastin Time	  Activated Partial Thromboplastin Time: 32.1, [28.5 - 37.0 sec], The recommended therapeutic heparin range (full dose) is 58-99 seconds.  Recommended therapeutic Argatroban range is 1.5 to 3.0 times the baseline  APTT value, not to exceed 100 seconds. Recommended therapeutic Refludan  range is 1.5 to 2.5 times thebaseline APTT.  Effective October 30, 2018 the reference range has changed.	    03-Feb-2019 14:32, Prothrombin Time and INR, Plasma	  Prothrombin Time, Plasma:    15.2, [10.0 - 12.9 sec], Effective October 30th, 2018 the reference range for PT has changed.	  INR:    1.32, [0.88 - 1.16 ratio]	  Hematology:	    03-Feb-2019 14:32, Complete Blood Count + Automated Diff	  WBC Count:    17.03, [3.80 - 10.50 K/uL]	  RBC Count:    5.90, [4.20 - 5.80 M/uL]	  Hemoglobin: 15.8, [13.0 - 17.0 g/dL]	  Hematocrit: 48.1, [39.0 - 50.0 %]	  Mean Cell Volume: 81.5, [80.0 - 100.0 fl]	  Mean Cell Hemoglobin:    26.8, [27.0 - 34.0 pg]	  Mean Cell Hemoglobin Conc: 32.8, [32.0 - 36.0 gm/dL]	  Red Cell Distrib Width: 13.3, [10.3 - 14.5 %]	  Platelet Count - Automated: 214, [150 - 400 K/uL]	  Auto Neutrophil #:    15.24, [1.80 - 7.40 K/uL]	  Auto Lymphocyte #:    0.88, [1.00 - 3.30 K/uL]	  Auto Monocyte #: 0.79, [0.00 - 0.90 K/uL]	  Auto Eosinophil #: 0.02, [0.00 - 0.50 K/uL]	  Auto Basophil #: 0.03, [0.00 - 0.20 K/uL]	  Auto Neutrophil %:    89.5, [43.0 - 77.0 %], Differential percentages must be correlated with absolute numbers for  clinical significance.	  Auto Lymphocyte %:    5.2, [13.0 - 44.0 %]	  Auto Monocyte %: 4.6, [2.0 - 14.0 %]	  Auto Eosinophil %: 0.1, [0.0 - 6.0 %]	  Auto Basophil %: 0.2, [0.0 - 2.0 %]	  Auto Immature Granulocyte %: 0.4, [0.0 - 1.5 %]	    03-Feb-2019 14:32, Nucleated RBC	  Nucleated RBC: 0, [0 - 0 /100 WBCs]	    Radiology:   X-Ray, Fluoroscopy:	    03-Feb-2019 18:07, CT Abdomen and Pelvis w/ IV Cont	  PACS Image: Image(s) Available

## 2019-02-04 NOTE — DISCHARGE NOTE ADULT - ADDITIONAL INSTRUCTIONS
Follow up with your PCP, Dr. Ryan within one week.  Follow up with colorectal surgeon, Dr. Espitia, in one week. Follow up with your PCP, Dr. Ryan within one week.  Follow up with colorectal surgeon, Dr. Espitia, in one week.-Patient and family to set up follow up appointments.  -Continue taking your medications as directed above.  -If symptoms persist/worsen, please call your PMD or return to the ED.

## 2019-02-05 LAB
CULTURE RESULTS: NO GROWTH — SIGNIFICANT CHANGE UP
SPECIMEN SOURCE: SIGNIFICANT CHANGE UP

## 2019-02-08 LAB
CULTURE RESULTS: SIGNIFICANT CHANGE UP
CULTURE RESULTS: SIGNIFICANT CHANGE UP
SPECIMEN SOURCE: SIGNIFICANT CHANGE UP
SPECIMEN SOURCE: SIGNIFICANT CHANGE UP

## 2019-02-13 PROBLEM — I10 ESSENTIAL (PRIMARY) HYPERTENSION: Chronic | Status: ACTIVE | Noted: 2019-02-03

## 2019-02-13 PROBLEM — E78.5 HYPERLIPIDEMIA, UNSPECIFIED: Chronic | Status: ACTIVE | Noted: 2019-02-03

## 2019-02-14 PROBLEM — Z00.00 ENCOUNTER FOR PREVENTIVE HEALTH EXAMINATION: Status: ACTIVE | Noted: 2019-02-14

## 2019-02-15 ENCOUNTER — APPOINTMENT (OUTPATIENT)
Dept: SURGERY | Facility: CLINIC | Age: 79
End: 2019-02-15

## 2019-02-25 ENCOUNTER — APPOINTMENT (OUTPATIENT)
Dept: SURGERY | Facility: CLINIC | Age: 79
End: 2019-02-25
Payer: MEDICARE

## 2019-02-25 VITALS — BODY MASS INDEX: 29.71 KG/M2 | HEIGHT: 64 IN | WEIGHT: 174 LBS

## 2019-02-25 PROCEDURE — 99203 OFFICE O/P NEW LOW 30 MIN: CPT

## 2019-02-25 RX ORDER — SIMVASTATIN 20 MG/1
20 TABLET, FILM COATED ORAL
Refills: 0 | Status: ACTIVE | COMMUNITY

## 2019-02-25 NOTE — PHYSICAL EXAM
[Normal Breath Sounds] : Normal breath sounds [Normal Heart Sounds] : normal heart sounds [Normal Rate and Rhythm] : normal rate and rhythm [Abdominal Masses] : Abdominal mass present [Abdomen Tenderness] : ~T ~M No abdominal tenderness [No Rash or Lesion] : No rash or lesion [de-identified] : nl [de-identified] : nl [de-identified] : mass in epigastrium - probably stool [de-identified] : 9 o'clock - induration; fistula opening but no pus [de-identified] : nl

## 2019-02-25 NOTE — HISTORY OF PRESENT ILLNESS
[de-identified] : This 78 year old was treated over fifteen years ago for a presumed perirectal abscess. Two weeks ago, he was treated at [facility] for the same symptoms. The painful anal lump drained spontaneously before coming to the ED.

## 2019-03-18 ENCOUNTER — APPOINTMENT (OUTPATIENT)
Dept: SURGERY | Facility: CLINIC | Age: 79
End: 2019-03-18
Payer: MEDICARE

## 2019-03-18 PROCEDURE — 99214 OFFICE O/P EST MOD 30 MIN: CPT

## 2019-03-19 NOTE — REVIEW OF SYSTEMS
[Shortness Of Breath] : no shortness of breath [Abdominal Pain] : no abdominal pain [Constipation] : no constipation [Negative] : Genitourinary

## 2019-03-19 NOTE — PHYSICAL EXAM
[Normal Heart Sounds] : normal heart sounds [Normal Breath Sounds] : Normal breath sounds [Normal Rate and Rhythm] : normal rate and rhythm [Abdominal Masses] : No abdominal masses [Abdomen Tenderness] : ~T ~M No abdominal tenderness [No Rash or Lesion] : No rash or lesion [de-identified] : nl [de-identified] : nl [de-identified] : nl [de-identified] : no induration or fluctuance

## 2019-03-19 NOTE — ASSESSMENT
[FreeTextEntry1] : Long discussion regarding all options and risks\par To continue the sitz baths for one more week\par Return in one month - to set up colonoscopy for the following month

## 2019-03-19 NOTE — HISTORY OF PRESENT ILLNESS
[de-identified] : The patient is feeling much improved. The drainage has stopped, and the anal pain has disappeared. He denies fever or chills.

## 2019-05-01 ENCOUNTER — APPOINTMENT (OUTPATIENT)
Dept: SURGERY | Facility: CLINIC | Age: 79
End: 2019-05-01

## 2019-05-06 ENCOUNTER — APPOINTMENT (OUTPATIENT)
Dept: SURGERY | Facility: CLINIC | Age: 79
End: 2019-05-06
Payer: MEDICARE

## 2019-05-06 PROCEDURE — 99214 OFFICE O/P EST MOD 30 MIN: CPT

## 2019-05-07 NOTE — HISTORY OF PRESENT ILLNESS
[de-identified] : There has not been any further drainage, and the pain has disappeared. The patient denied fever or chills.

## 2019-05-07 NOTE — ASSESSMENT
[FreeTextEntry1] : Long discussion regarding all options and risks\par To continue soaking for one more week\par After returns from vacation, to schedule colonoscopy

## 2019-05-07 NOTE — PHYSICAL EXAM
[Normal Breath Sounds] : Normal breath sounds [Normal Heart Sounds] : normal heart sounds [Normal Rate and Rhythm] : normal rate and rhythm [Abdominal Masses] : No abdominal masses [Abdomen Tenderness] : ~T ~M No abdominal tenderness [de-identified] : nl [de-identified] : nl [de-identified] : sinus opening is closed; no induration or fluctuance

## 2019-11-19 NOTE — PATIENT PROFILE ADULT - HAS THE PATIENT RECEIVED THE INFLUENZA VACCINE THIS SEASON?
What Type Of Note Output Would You Prefer (Optional)?: Standard Output Hpi Title: Evaluation of Skin Lesions How Severe Are Your Spot(S)?: mild Have Your Spot(S) Been Treated In The Past?: has not been treated yes...

## 2020-02-02 NOTE — DISCHARGE NOTE ADULT - PRINCIPAL DIAGNOSIS
[facility]       Department of Emergency Medicine   ED  Provider Note  Admit Date/RoomTime: 2/1/2020  6:49 PM  ED Room: /  Chief Complaint   Fall (From hoverboard. Denies head injury or loc.) and Wrist Injury (Left wrist. )    History of Present Illness   Source of history provided by:  patient. History/Exam Limitations: none. Mellisa Alvarez is a 40 y.o. old female who has a past medical history of:   Past Medical History:   Diagnosis Date    Asthma     Uses albuterol when necessary.  Clotting disorder (HCC)     NEISHA-1    Endometriosis     Gum disease 2011    MTHFR mutation ([facility] [address]     1298    Osteopenia     Pernicious anemia     Premature ovarian failure     Raynaud's disease     Skin mole     precancerous mole removed in 1/2011    Tachycardia 9/2014    holter monitor done , no ectopy, hormone induceed ?   presents to the emergency department by private vehicle, for Left wrist pain which occured 1 hour(s) prior to arrival.  Cause of complaint: fall while at home. She fell backwards and landed with her arms extended behind her and finger facing forward in full extension of wrist. There has been a history of no prior problems with this area in the past.   She is right handed. The patients tetanus status is up to date. Since onset the symptoms have been marked in degree. Her pain is aggraveated by flexion of wrist and relieved by ice and rest.     ROS    Pertinent positives and negatives are stated within HPI, all other systems reviewed and are negative. Past Surgical History:  has a past surgical history that includes cyst removal (5/2000); Colonoscopy (2000); Upper gastrointestinal endoscopy (2000); skin biopsy; other surgical history (3/26/15 excision lesion lt upper arm); laparoscopic appendectomy (N/A, 6/8/2019); and Appendectomy. Social History:  reports that she quit smoking about 19 years ago. Her smoking use included cigarettes. She has a 0.13 pack-year smoking history.  She has never used smokeless tobacco. She reports that she does not drink alcohol or use drugs. Family History: family history includes Colon Cancer in her paternal grandfather and paternal grandmother; Diabetes in her maternal grandfather; Emphysema in her maternal grandfather; Heart Disease in her paternal grandmother; High Cholesterol in her mother; Ulcerative Colitis in her father. Allergies: Hydroxyprogesterone; Adhesive tape; Clindamycin/lincomycin; Codeine; and Prednisone    Physical Exam            ED Triage Vitals [02/01/20 1848]   BP Temp Temp src Pulse Resp SpO2 Height Weight   115/69 98 °F (36.7 °C) -- 93 16 100 % 5' 8\" (1.727 m) 160 lb (72.6 kg)     Oxygen Saturation Interpretation: Normal.    Constitutional:  Alert, development consistent with age. Neck:  Normal ROM. Supple. Non-tender. Wrist:  Left dorsal and radial area, no snuffbox. Tenderness:  moderate. Swelling: None. Deformity: no.            ROM: diminished range with pain. Flexion was limited. Skin:  no erythema, rash or wounds noted. Neurovascular: Motor deficit: none. Sensory deficit:   none. Pulse deficit: none. Capillary refill: normal.  Elbow: Left              Tenderness: none              Swelling: None. Deformity: no.               ROM: full range of motion. Skin:  no erythema, rash or wounds noted. Hand: Left              Tenderness: none              Swelling: None. Deformity: no.               ROM: full range of motion. Skin:  no erythema, rash or wounds noted. Lymphatics: No lymphangitis or adenopathy noted. Neurological:  Oriented. Motor functions intact. Lab / Imaging Results   (All laboratory and radiology results have been personally reviewed by myself)  Labs:  No results found for this visit on 02/01/20. Imaging:   All Radiology results interpreted by Radiologist unless otherwise noted. XR WRIST LEFT (MIN 3 VIEWS)   Final Result      Comminuted, intra-articular fracture involving the distal radius   without significant displacement. ED Course / Medical Decision Making   Medications - No data to display     Consult:   none    Procedure(s):   none    MDM:    Films were obtained and are positive for fracture. Plan is subsequently for symptom control, limited use and  immobilization with appropriate outpatient follow-up. Counseling: The emergency provider has spoken with the patient and discussed todays results, in addition to providing specific details for the plan of care and counseling regarding the diagnosis and prognosis. Questions are answered at this time and they are agreeable with the plan. Assessment      1. Closed fracture of distal end of left radius, unspecified fracture morphology, initial encounter      Plan   Disposition: Discharge to home  Patient condition is stable    New Medications     New Prescriptions    HYDROCODONE-ACETAMINOPHEN (NORCO) 5-325 MG PER TABLET    Take 1 tablet by mouth every 6 hours as needed for Pain for up to 3 days. Electronically signed by Sri Coelho PA-C   DD: 2/1/20  **This report was transcribed using voice recognition software. Every effort was made to ensure accuracy; however, inadvertent computerized transcription errors may be present.   END OF ED PROVIDER NOTE       Sri Coelho PA-C  02/02/20 0204 Perianal abscess

## 2020-09-01 NOTE — ED PROVIDER NOTE - CARE PLAN
Yes Principal Discharge DX:	Sepsis  Assessment and plan of treatment:	admit  Secondary Diagnosis:	Perianal abscess

## 2020-09-04 ENCOUNTER — APPOINTMENT (OUTPATIENT)
Dept: SURGERY | Facility: CLINIC | Age: 80
End: 2020-09-04
Payer: MEDICARE

## 2020-09-04 VITALS — HEIGHT: 66 IN | BODY MASS INDEX: 25.71 KG/M2 | WEIGHT: 160 LBS

## 2020-09-04 DIAGNOSIS — K60.4 RECTAL FISTULA: ICD-10-CM

## 2020-09-04 DIAGNOSIS — K62.5 HEMORRHAGE OF ANUS AND RECTUM: ICD-10-CM

## 2020-09-04 DIAGNOSIS — K62.89 OTHER SPECIFIED DISEASES OF ANUS AND RECTUM: ICD-10-CM

## 2020-09-04 DIAGNOSIS — K56.49 OTHER IMPACTION OF INTESTINE: ICD-10-CM

## 2020-09-04 DIAGNOSIS — R19.8 OTHER SPECIFIED SYMPTOMS AND SIGNS INVOLVING THE DIGESTIVE SYSTEM AND ABDOMEN: ICD-10-CM

## 2020-09-04 PROCEDURE — 99214 OFFICE O/P EST MOD 30 MIN: CPT

## 2020-09-06 PROBLEM — K62.89 RECTAL PAIN: Status: ACTIVE | Noted: 2020-09-06

## 2020-09-06 PROBLEM — R19.8 RECTAL PRESSURE: Status: ACTIVE | Noted: 2020-09-06

## 2020-09-06 PROBLEM — K60.4 PERIRECTAL FISTULA: Status: ACTIVE | Noted: 2019-02-25

## 2020-09-06 PROBLEM — K56.49 IMPACTION OF COLON: Status: ACTIVE | Noted: 2020-09-06

## 2020-09-06 NOTE — PHYSICAL EXAM
[Normal Breath Sounds] : Normal breath sounds [Normal Heart Sounds] : normal heart sounds [Normal Rate and Rhythm] : normal rate and rhythm [Abdominal Masses] : No abdominal masses [Abdomen Tenderness] : ~T ~M No abdominal tenderness [No Rash or Lesion] : No rash or lesion [de-identified] : nl [de-identified] : nl [de-identified] : large fecal impaction [de-identified] : nl

## 2020-09-06 NOTE — REVIEW OF SYSTEMS
[Heart Rate Is Slow] : the heart rate was not slow [Chest Pain] : no chest pain [Shortness Of Breath] : no shortness of breath [Abdominal Pain] : no abdominal pain [Negative] : Eyes

## 2020-09-06 NOTE — ASSESSMENT
[FreeTextEntry1] : Long discussion regarding all options and risks\par To begin enemas and suppositories\par To return to set up colonoscopy in the near future\par \par

## 2020-09-06 NOTE — HISTORY OF PRESENT ILLNESS
[de-identified] : The patient has been experiencing from increasing constipation for the past several years. He had not passed any stool for at least five days before presenting to the office today. He has been intermittently passing BRBPR. He denies fever or chills.

## 2020-09-25 NOTE — PATIENT PROFILE ADULT - BRADEN NUTRITION
Staff message rec from BHUMIKA Bello Pharmacist. Pt is to start on Temodar for 5 days out of 28.    Dose calculates to 225 mg. She is clarifying the dose with Dr Trimble.    Luciana Dela Cruz RPH sent to John Trimble MD; Lisa Montesinos, RN; Sabina Acosta.             Thanks,   I am calculating 225 mg for first cycle.  Is this acceptable dose?     Thanks,   Jaylene    Previous Messages    ----- Message -----   From: John Trimble MD   Sent: 9/25/2020  11:45 AM EDT   To: Luciana Dela Cruz RPH, Lisa Montesinos, RN     START HER ON TEMODAR X 5 DAYS EVERY MONTH PLEASE             
(4) excellent

## 2021-07-18 NOTE — ED ADULT NURSE NOTE - ED STAT RN HANDOFF DETAILS 4
Report given to Keisha LUND

## 2021-10-06 PROBLEM — K62.5 BLEEDING PER RECTUM: Status: ACTIVE | Noted: 2020-09-06

## 2022-07-21 ENCOUNTER — NON-APPOINTMENT (OUTPATIENT)
Age: 82
End: 2022-07-21

## 2023-01-25 NOTE — ASSESSMENT
Per pt -  Dr. Dumont's recommendation on DDAVP was to not give prior to 5/4/18 surgery with Dr. Swanson. Message sent to Dr. Dumont's staff to confirm.    [FreeTextEntry1] : Long discussion regarding all options and risks\par To begin frequent sitz baths\par To return in three weeks \par Schedule colonoscopy - never had one Erythromycin Pregnancy And Lactation Text: This medication is Pregnancy Category B and is considered safe during pregnancy. It is also excreted in breast milk.

## 2023-10-04 NOTE — REASON FOR VISIT
Your Child's Health  12-Month-Old Visit      [facility]  October 4, 2023    Visit Vitals  Pulse 116   Temp 97.6 °F (36.4 °C) (Tympanic)   Resp 36   Ht 31.5\" (80 cm)   Wt (!) 12.4 kg (27 lb 5 oz)   HC 46 cm (18.11\")   BMI 19.35 kg/m²           Weight:     YOUR CHILD'S 12 MONTH OLD VISIT       Key points at this age…  Correct use of a car seat is always critically important for your baby’s safety. When you replace the infant car seat, make sure to get a convertible car seat that can be kept rear facing until your child reaches the top height or weight limit allowed by the car seat’s .        Get your child’s dental health off to a good start by starting regular brushing habits (with regular fluoridated toothpaste--not baby toothpaste) and discontinuing the bottle as soon as possible.    Consistency is critical right now--consistent bedtime routines will help your baby sleep well and consistent responses to how they are behaving will help them learn what is “right” and what is “wrong”.     NUTRITION:   As your baby’s growth slows down after their first birthday, their appetite may vary from day to day--this is normal. It is also common for them to prefer a few particular foods for days (or weeks!) and to sometimes eat one big meal a day and not really care about eating for the rest of the day. You should simply offer healthy foods in small portions. After eating that, if your child still seems hungry, give them another small portion. Continue to be careful to avoid hard, chunky or chewy foods that your child could choke on. And remember that their likes and dislikes will change rapidly, so don’t be afraid to try again with something that ended up on the floor a couple weeks ago. Keep avoiding sweets, junk food and sweet drinks (fruit drinks, soda)--there’s no reason to start those unhealthy habits this early in life!    A switch from formula to cow’s milk is recommended at age 12 months (if  your baby is not sensitive to dairy products).  Whole milk is often recommended at the time of the switch because of its higher fat content. However, if your child is getting a healthy variety of table foods or if your child is at risk for becoming overweight (a family history of obesity, high blood pressure or heart disease), 2% milk (low fat) is okay. Your child should have between 16 and 24 ounces of milk per day.    If your child is still nursing, you can start supplementing with beverages from a cup to ease the transition to independent drinking.    Eating fruit is much healthier than drinking juice. Even \"natural\" juices have extra sugars that can lead to tooth decay and weight gain. Children between 1 and 3 years of age should have no more than 4 ounces of 100% fruit juice daily. Do not water it down in a bottle or sippy cup that they can carry around and drink from over an extended period of time; this frequent exposure to the sugars in juice (even if diluted with water) just increases their risk of tooth decay.      Most toddlers who eat a varied diet will be getting adequate vitamins-- with the exception of vitamin D. For that reason, a straight vitamin D supplement (with either 400 or 600 IU per dose) or a multivitamin preparation with iron is reasonable. (Liquid preparations are available for infants. They should not be given chewable vitamins because they could choke on those.)      DEVELOPMENT/BEHAVIOR:    Many children at this age are walking (or soon will be). There is a wide range of “normal”, however, and some children will not walk until between 15 and 18 months. (Encourage your child to walk by letting them move around without help and try not to pick them up too often!) At this age they will also be able to drink from a cup, point or gesture to things they are interested in, babble and say 1 or 2 words.     Mobility and increasing independence happen together around this age--this makes things  challenging! Not only do they have to be watched more carefully (they can get into more things than they could before!), but they will start wanting to have their own way more often. So, it is an important time to make sure they understand (or to teach them) that what they choose to do is okay or not okay. (If you laugh when they are hugging their favorite stuffed toy, but you also laugh when they throw food on the floor, they aren’t learning which is right and which is wrong.) Giving immediate, consistent feedback to a young one is the best way to let them know which actions are okay and which are not okay. Keep it simple--long lectures don’t work in this age group. Distracting them from unwanted behaviors by offering something else (another toy, a book, even a snuggle and a hug) can be very effective as well.     Remember, kids want your approval and your positive attention. So give kind words and smiles when they are behaving in a good way. When they are doing something you don’t like, turn away, ignore them or say something simple like “that’s not nice”.  And don’t wait until your child is misbehaving to give them feedback--being proactive (“catch them being good” or “time in”) is the best way for them to learn early what type of behavior will earn your positive attention and smiles. Our busy world and the constant presence of cell phones make it easy to ignore your child when they are behaving well--but that’s exactly the time you should be telling them that they are doing the right thing!    Try to avoid using the word “no” as much as you can. That’s an easy word for them to learn to say back to you, plus it only tells them what you do not want them to do-- it doesn’t really teach them a better option. Try saying “let’s not do this” followed by “let’s do this” more often; save “no” for situations when your baby might be getting in harm’s way. It will mean more if you don’t say it all the time.   DENTAL CARE:    A very important daily routine is teeth brushing. Establish regular times each day for this task, ideally after breakfast and before bed. As soon as babies have teeth, you should be brushing them twice a day with a tiny smear (the size of a grain of rice!) of regular (fluoridated) toothpaste. (You don’t need to buy baby toothpaste.) Fluoride is very important for your child’s dental health. In addition to brushing with fluoridated toothpaste, your child should be drinking the tap (city) water (which is carefully monitored so it has the ideal amount of fluoride for dental health). If you have well water instead of a city water supply, however, you should have it tested for fluoride content to determine whether your child might need fluoride supplements.    It’s also important to get your child OFF the bottle as soon as possible. At this point, the bottle only causes problems, particularly tooth decay! If your child insists on a bottle, try putting only water in the bottle and everything else in a cup.     CAR SAFETY:   An approved car seat in the back seat of the car is required by [place] law. Your child is safest in a rear-facing car seat, so replace your infant car seat with a convertible car seat that can be kept rear facing until your child reaches the top height or weight limit allowed by the car seat’s .       ACCIDENT PREVENTION:  Setting up your home to be safe can also help shape your child’s behavior. When there are less things that are off limits to them, then you will be saying “no” and redirecting them less often.   1.  Falls: Try to remove furniture with sharp edges which can result in cuts and lacerations for toddling children (who fall frequently!).  Keep allen on stairs and window latches on upper-story windows.  2.  Burns: Begin to safely teach your child what “hot” and “cold” mean.  Have a family fire safety plan, including regular smoke detector (and carbon monoxide detector)  battery checks, working fire extinguishers, and escape routes.  3.  Poisoning: Keep all cleaning and chemical products safely stored out of sight and out of reach. Pay attention to what is under your bathroom sink as well as your kitchen sink. Keep purses (your own and ones belonging to visitors) out of reach of curious toddlers; they can quickly find medicines, cigarettes, and small objects to choke on!     For all medicines, including vitamins, keep the original safety caps that came with the bottle; do not transfer medicines to non-child-proofed or unlabeled containers. Be especially careful when around older people because they may keep their medicines out in the open or in non-childproofed containers.   Call the Poison Center at 1-475.472.7203 for any known or suspected poisoning (if you haven’t already, put this phone number in your phone for quick reference!).    LEAD EXPOSURE: If there is any lead in your home or yard, crawling and toddling children are at risk for lead poisoning because they are moving around on their own and touching so many things. Lead poisoning can have a harmful and irreversible effect on your child’s behavior, intelligence and learning potential, so it is very important to prevent and screen for lead exposure. If you live in [facility], your child should be screened for lead now (at age 12 months). If you do not live in [facility], talk to your doctor about lead screening if any of the following apply: (1) your child currently (or had previously) lives in or frequently visits a house or building built before 1950 (including , grandparent homes, etc); (2) your child currently (or had previously) lives in or frequently visits a house or building built before 1978 with recent or ongoing renovations; (3) your child has a brother, sister or playmate who has had lead poisoning; (4) your child is enrolled in Medicaid or WIC.  If you have questions about older neighborhoods  in [place] that might have lead connecting (or service) pipes, do an internet search for \"[place] lead awareness and drinking water safety\". From this web page, you can search for your address to find out if your home was built with lead service lines. There are also helpful hints regarding water safety on this site.         SLEEP: Establishing a consistent bedtime routine at this age can help develop good long term sleep habits.   Create a nightly routine before bed starting with brushing teeth, spending quiet time with your child (read and sing to them), then ending with them soothing themselves to sleep in their crib. Be consistent with your routine!   Avoid overly stimulating activities before bedtime, including active play, stimulating games or videos.   A favorite toy and a nightlight are often helpful.   Make sure your child does not nap too late in the afternoon (they need to be tired at bedtime!).  Have a consistent “wake up” time as well; this further helps young children develop a regular sleep cycle.      SUN EXPOSURE: Continue to try to protect your baby from direct sun. Keep them in the shade as much as possible and use protective clothing (including hats and sunglasses) when you are out. Always use infant sunscreens with an SPF of 15 or higher to protect from sunburn and long term skin damage; apply it at least 20 to 30 minutes before going out in the sun.    SMOKING:  Continue to protect your child from cigarette smoke. Exposure to cigarette smoke will increase your baby’s risk of asthma, ear infections, and respiratory infections (pneumonia). If you smoke and are ready to consider quitting, talk to your doctor. Nicotine replacement products can be very helpful in breaking this tough addiction.       SHOES:  Children this age need shoes primarily to protect their feet when they start walking outside. When you buy shoes, choose ones that have a roomy fit and flat, flexible soles with nonskid  bottoms. And don’t spend too much money--at this age, they outgrow them quickly!      MEDICATION FOR FEVER OR PAIN:   Acetaminophen liquid (e.g., Tylenol or Tempra) may be given every four hours as needed for pain or fever.  Be sure to check which product CONCENTRATION you are using.    INFANT/CHILDREN’S Tylenol/Acetaminophen  (160 MG/5 mL)  Child’s Weight:            Dose:  12 - 17 pounds:           80 mg (2.5 mL  (1/2 Teaspoon))  18 - 23 pounds:           120 mg (3.75 mL (3/4 Teaspoon))      INFANT Ibuprofen (50 mg/1.25 ml) liquid (for example Advil or Motrin) may be given every 6 hours as needed for pain or fever.  Child’s Weight:            Dose:  12 - 17 pounds:           50 mg (1.25 mL)    18 - 23 pounds:           75 mg (1.875 mL)      CHILDREN'S Ibuprofen (100 mg/5 mL) liquid (for example Advil or Motrin) may be given every 6 hours as needed for pain or fever.  Child’s Weight:            Dose:  12 - 17 pounds:           50 mg (2.5 mL (1/2 Teaspoon))  18 - 23 pounds:           75 mg (3.75  mL (3/4 Teaspoon))  24 - 35 pounds           100 mg (5 mL (1 Teaspoon))    If [place] is outside these weight ranges, call your pediatrician's office for advice.    Most Recent Immunizations   Administered Date(s) Administered    DTaP/Hep B/IPV 05/12/2023    Hib (PRP-OMP) 03/03/2023    Pneumococcal Conjugate 13 Valent Vacc (Prevnar 13) 05/12/2023    Rotavirus - pentavalent 05/12/2023   Deferred Date(s) Deferred    Hep B, adolescent or pediatric 2022       If Maria C develops any of the following reactions within 72 hours after an immunization, notify your pediatrician by calling the pediatric phone nurse:  A temperature of 105 degrees or above  More than 3 hours of continuous crying  A shrill, high-pitched cry  A pale, limp spell  A seizure or fainting spell.  In this case, you should call 911 or go immediately to the emergency room.    NEXT VISIT:  15 MONTHS OF AGE    Thank you for entrusting your care to [facility].      Also, check out “Children’s Health” on the [facility] Blog for updates on timely topics regarding children’s health!   [Follow-Up: _____] : a [unfilled] follow-up visit

## 2023-11-29 NOTE — H&P ADULT - ASSESSMENT
spouse
Patient is a 78 year old male with PMH of HTN, HLD who presents to the ED with complaints of perianal pain and fever. Admitted to [facility] for sepsis and perianal abscess.

## 2024-01-03 NOTE — ED ADULT NURSE NOTE - CHIEF COMPLAINT
Is the patient currently in the state of [place]? YES    Visit mode:VIDEO    If the visit is dropped, the patient can be reconnected by: VIDEO VISIT: Text to cell phone:   Telephone Information:   Mobile [phone]       Will anyone else be joining the visit? NO  (If patient encounters technical issues they should call [phone]    How would you like to obtain your AVS? MyChart    Are changes needed to the allergy or medication list? No    Reason for visit: Consult    Linda PRAJAPATI      
The patient is a 78y Male complaining of fever.

## 2024-09-25 ENCOUNTER — APPOINTMENT (OUTPATIENT)
Dept: SURGICAL ONCOLOGY | Facility: CLINIC | Age: 84
End: 2024-09-25
Payer: MEDICARE

## 2024-09-25 VITALS
OXYGEN SATURATION: 98 % | BODY MASS INDEX: 25.71 KG/M2 | WEIGHT: 160 LBS | DIASTOLIC BLOOD PRESSURE: 80 MMHG | HEIGHT: 66 IN | HEART RATE: 78 BPM | SYSTOLIC BLOOD PRESSURE: 170 MMHG

## 2024-09-25 DIAGNOSIS — C44.319 BASAL CELL CARCINOMA OF SKIN OF OTHER PARTS OF FACE: ICD-10-CM

## 2024-09-25 PROCEDURE — 99205 OFFICE O/P NEW HI 60 MIN: CPT

## 2024-09-29 NOTE — HISTORY OF PRESENT ILLNESS
[de-identified] : Patient is a 83 y/o M who presents a chief complaint of newly diagnosed basal cell carcinoma of the forehead, referred by Dr. Christian Mahmood.   Dermpath 6/10/24 Right superior forehead, shave bx: -Basal cell carcinoma, nodular and superficial

## 2024-09-29 NOTE — ASSESSMENT
[FreeTextEntry1] : Basal cell carcinoma right superior forehead I had a long discussion with the pt and his daughter regarding his diagnosis, prognosis and all management options Rec wide excision w/ plastic reconstruction  Surgical procedure discussed in detail All questions answered Will need medical clearance

## 2024-09-29 NOTE — ADDENDUM
[FreeTextEntry1] : I, Shobha Vásquez, acted solely as a scribe for Dr. Carl Estes on this date 09/25/2024.

## 2024-09-29 NOTE — PHYSICAL EXAM
[Normal] : supple, no neck mass and thyroid not enlarged [Normal Neck Lymph Nodes] : normal neck lymph nodes  [Normal Supraclavicular Lymph Nodes] : normal supraclavicular lymph nodes [Normal Groin Lymph Nodes] : normal groin lymph nodes [Normal Axillary Lymph Nodes] : normal axillary lymph nodes [Normal] : oriented to person, place and time, with appropriate affect [de-identified] : 8mm superficial lesion right forehead at sight of BCC

## 2024-09-29 NOTE — REASON FOR VISIT
[Initial Consultation] : an initial consultation for [Family Member] : family member [FreeTextEntry2] : BCC

## 2024-09-29 NOTE — CONSULT LETTER
[Dear  ___] : Dear  [unfilled], [Consult Letter:] : I had the pleasure of evaluating your patient, [unfilled]. [Please see my note below.] : Please see my note below. [Sincerely,] : Sincerely, [FreeTextEntry3] : Carl Estes MD FACS

## 2024-09-29 NOTE — HISTORY OF PRESENT ILLNESS
[de-identified] : Patient is a 85 y/o M who presents a chief complaint of newly diagnosed basal cell carcinoma of the forehead, referred by Dr. Christian Mahmood.   Dermpath 6/10/24 Right superior forehead, shave bx: -Basal cell carcinoma, nodular and superficial

## 2024-10-11 ENCOUNTER — OUTPATIENT (OUTPATIENT)
Dept: OUTPATIENT SERVICES | Facility: HOSPITAL | Age: 84
LOS: 1 days | End: 2024-10-11
Payer: MEDICARE

## 2024-10-11 DIAGNOSIS — C80.1 MALIGNANT (PRIMARY) NEOPLASM, UNSPECIFIED: ICD-10-CM

## 2024-10-11 PROCEDURE — 88321 CONSLTJ&REPRT SLD PREP ELSWR: CPT

## 2024-10-22 LAB — SURGICAL PATHOLOGY STUDY: SIGNIFICANT CHANGE UP

## 2024-10-31 NOTE — ED PROVIDER NOTE - EYES, MLM
Detail Level: Zone Detail Level: Detailed Clear bilaterally, pupils equal, round and reactive to light.

## 2024-11-08 ENCOUNTER — OUTPATIENT (OUTPATIENT)
Dept: OUTPATIENT SERVICES | Facility: HOSPITAL | Age: 84
LOS: 1 days | End: 2024-11-08
Payer: MEDICARE

## 2024-11-08 VITALS
HEIGHT: 63 IN | SYSTOLIC BLOOD PRESSURE: 158 MMHG | WEIGHT: 159.61 LBS | TEMPERATURE: 98 F | OXYGEN SATURATION: 98 % | DIASTOLIC BLOOD PRESSURE: 70 MMHG | HEART RATE: 70 BPM | RESPIRATION RATE: 18 BRPM

## 2024-11-08 DIAGNOSIS — Z01.818 ENCOUNTER FOR OTHER PREPROCEDURAL EXAMINATION: ICD-10-CM

## 2024-11-08 DIAGNOSIS — C44.319 BASAL CELL CARCINOMA OF SKIN OF OTHER PARTS OF FACE: ICD-10-CM

## 2024-11-08 DIAGNOSIS — I10 ESSENTIAL (PRIMARY) HYPERTENSION: ICD-10-CM

## 2024-11-08 DIAGNOSIS — K08.409 PARTIAL LOSS OF TEETH, UNSPECIFIED CAUSE, UNSPECIFIED CLASS: Chronic | ICD-10-CM

## 2024-11-08 DIAGNOSIS — E78.5 HYPERLIPIDEMIA, UNSPECIFIED: ICD-10-CM

## 2024-11-08 LAB
ANION GAP SERPL CALC-SCNC: 7 MMOL/L — SIGNIFICANT CHANGE UP (ref 5–17)
BUN SERPL-MCNC: 20 MG/DL — SIGNIFICANT CHANGE UP (ref 7–23)
CALCIUM SERPL-MCNC: 9.5 MG/DL — SIGNIFICANT CHANGE UP (ref 8.4–10.5)
CHLORIDE SERPL-SCNC: 103 MMOL/L — SIGNIFICANT CHANGE UP (ref 96–108)
CO2 SERPL-SCNC: 31 MMOL/L — SIGNIFICANT CHANGE UP (ref 22–31)
CREAT SERPL-MCNC: 1.06 MG/DL — SIGNIFICANT CHANGE UP (ref 0.5–1.3)
EGFR: 69 ML/MIN/1.73M2 — SIGNIFICANT CHANGE UP
GLUCOSE SERPL-MCNC: 91 MG/DL — SIGNIFICANT CHANGE UP (ref 70–99)
HCT VFR BLD CALC: 47.1 % — SIGNIFICANT CHANGE UP (ref 39–50)
HGB BLD-MCNC: 15.9 G/DL — SIGNIFICANT CHANGE UP (ref 13–17)
MCHC RBC-ENTMCNC: 27.7 PG — SIGNIFICANT CHANGE UP (ref 27–34)
MCHC RBC-ENTMCNC: 33.8 G/DL — SIGNIFICANT CHANGE UP (ref 32–36)
MCV RBC AUTO: 82.2 FL — SIGNIFICANT CHANGE UP (ref 80–100)
NRBC # BLD: 0 /100 WBCS — SIGNIFICANT CHANGE UP (ref 0–0)
PLATELET # BLD AUTO: 200 K/UL — SIGNIFICANT CHANGE UP (ref 150–400)
POTASSIUM SERPL-MCNC: 3.9 MMOL/L — SIGNIFICANT CHANGE UP (ref 3.5–5.3)
POTASSIUM SERPL-SCNC: 3.9 MMOL/L — SIGNIFICANT CHANGE UP (ref 3.5–5.3)
RBC # BLD: 5.73 M/UL — SIGNIFICANT CHANGE UP (ref 4.2–5.8)
RBC # FLD: 13.2 % — SIGNIFICANT CHANGE UP (ref 10.3–14.5)
SODIUM SERPL-SCNC: 141 MMOL/L — SIGNIFICANT CHANGE UP (ref 135–145)
WBC # BLD: 6.5 K/UL — SIGNIFICANT CHANGE UP (ref 3.8–10.5)
WBC # FLD AUTO: 6.5 K/UL — SIGNIFICANT CHANGE UP (ref 3.8–10.5)

## 2024-11-08 RX ORDER — 0.9 % SODIUM CHLORIDE 0.9 %
1000 INTRAVENOUS SOLUTION INTRAVENOUS
Refills: 0 | Status: DISCONTINUED | OUTPATIENT
Start: 2024-11-19 | End: 2024-12-03

## 2024-11-08 NOTE — H&P PST ADULT - PROBLEM SELECTOR PLAN 1
Patient provided with pre-operative instructions and verbalized understanding.  Patient can take amlodipine but otherwise will be NPO on day of surgery. Patient will stop NSAIDs, aspirin, herbal supplements or vitamins 1 week prior to surgery.          Pending medical clearance as per surgeon.

## 2024-11-08 NOTE — H&P PST ADULT - COMMENTS
Denies personal h/o or family h/o DVT, PE  Denies bleeding or clotting disorders  Denies dentures/partials, loose teeth/caps

## 2024-11-08 NOTE — H&P PST ADULT - NSICDXPASTMEDICALHX_GEN_ALL_CORE_FT
PAST MEDICAL HISTORY:  HTN (hypertension)     Hyperlipidemia      PAST MEDICAL HISTORY:  [place] (hard of hearing)     HTN (hypertension)     Hyperlipidemia

## 2024-11-08 NOTE — H&P PST ADULT - RESPIRATORY
clear to auscultation bilaterally/no wheezes/no respiratory distress/airway patent/breath sounds equal

## 2024-11-08 NOTE — H&P PST ADULT - HISTORY OF PRESENT ILLNESS
scab, started to bleed, went to derm,  Patient is a 84 year old M with PMHx of HTN, HLD presents for perioperative testing for wide excision right forehead BCC with Dr. Estes scheduled for 11/19/2024. Reports noticing a scab on his forehead that started to bleed, was taken to the dermatologist by his daughter, had biopsy, came back as skin cancer, therefore having upcoming surgery. Denies any acute symptoms at this time. Patient otherwise feels well overall.

## 2024-11-08 NOTE — H&P PST ADULT - NSANTHOSAYNRD_GEN_A_CORE
neck 15 inches/No. DAVE screening performed.  STOP BANG Legend: 0-2 = LOW Risk; 3-4 = INTERMEDIATE Risk; 5-8 = HIGH Risk

## 2024-11-08 NOTE — H&P PST ADULT - PROBLEM/PLAN-1
Bactrim Pregnancy And Lactation Text: This medication is Pregnancy Category D and is known to cause fetal risk.  It is also excreted in breast milk. Topical Clindamycin Pregnancy And Lactation Text: This medication is Pregnancy Category B and is considered safe during pregnancy. It is unknown if it is excreted in breast milk. Topical Retinoid Pregnancy And Lactation Text: This medication is Pregnancy Category C. It is unknown if this medication is excreted in breast milk. Tetracycline Counseling: Patient counseled regarding possible photosensitivity and increased risk for sunburn.  Patient instructed to avoid sunlight, if possible.  When exposed to sunlight, patients should wear protective clothing, sunglasses, and sunscreen.  The patient was instructed to call the office immediately if the following severe adverse effects occur:  hearing changes, easy bruising/bleeding, severe headache, or vision changes.  The patient verbalized understanding of the proper use and possible adverse effects of tetracycline.  All of the patient's questions and concerns were addressed. Patient understands to avoid pregnancy while on therapy due to potential birth defects. Azithromycin Pregnancy And Lactation Text: This medication is considered safe during pregnancy and is also secreted in breast milk. Dapsone Counseling: I discussed with the patient the risks of dapsone including but not limited to hemolytic anemia, agranulocytosis, rashes, methemoglobinemia, kidney failure, peripheral neuropathy, headaches, GI upset, and liver toxicity.  Patients who start dapsone require monitoring including baseline LFTs and weekly CBCs for the first month, then every month thereafter.  The patient verbalized understanding of the proper use and possible adverse effects of dapsone.  All of the patient's questions and concerns were addressed. Minocycline Counseling: Patient advised regarding possible photosensitivity and discoloration of the teeth, skin, lips, tongue and gums.  Patient instructed to avoid sunlight, if possible.  When exposed to sunlight, patients should wear protective clothing, sunglasses, and sunscreen.  The patient was instructed to call the office immediately if the following severe adverse effects occur:  hearing changes, easy bruising/bleeding, severe headache, or vision changes.  The patient verbalized understanding of the proper use and possible adverse effects of minocycline.  All of the patient's questions and concerns were addressed. Include Pregnancy/Lactation Warning?: No Topical Clindamycin Counseling: Patient counseled that this medication may cause skin irritation or allergic reactions.  In the event of skin irritation, the patient was advised to reduce the amount of the drug applied or use it less frequently.   The patient verbalized understanding of the proper use and possible adverse effects of clindamycin.  All of the patient's questions and concerns were addressed. Birth Control Pills Pregnancy And Lactation Text: This medication should be avoided if pregnant and for the first 30 days post-partum. Isotretinoin Counseling: Patient should get monthly blood tests, not donate blood, not drive at night if vision affected, not share medication, and not undergo elective surgery for 6 months after tx completed. Side effects reviewed, pt to contact office should one occur. Topical Sulfur Applications Counseling: Topical Sulfur Counseling: Patient counseled that this medication may cause skin irritation or allergic reactions.  In the event of skin irritation, the patient was advised to reduce the amount of the drug applied or use it less frequently.   The patient verbalized understanding of the proper use and possible adverse effects of topical sulfur application.  All of the patient's questions and concerns were addressed. Detail Level: Zone Isotretinoin Pregnancy And Lactation Text: This medication is Pregnancy Category X and is considered extremely dangerous during pregnancy. It is unknown if it is excreted in breast milk. Benzoyl Peroxide Pregnancy And Lactation Text: This medication is Pregnancy Category C. It is unknown if benzoyl peroxide is excreted in breast milk. Azithromycin Counseling:  I discussed with the patient the risks of azithromycin including but not limited to GI upset, allergic reaction, drug rash, diarrhea, and yeast infections. Topical Sulfur Applications Pregnancy And Lactation Text: This medication is Pregnancy Category C and has an unknown safety profile during pregnancy. It is unknown if this topical medication is excreted in breast milk. High Dose Vitamin A Counseling: Side effects reviewed, pt to contact office should one occur. Spironolactone Pregnancy And Lactation Text: This medication can cause feminization of the male fetus and should be avoided during pregnancy. The active metabolite is also found in breast milk. Dapsone Pregnancy And Lactation Text: This medication is Pregnancy Category C and is not considered safe during pregnancy or breast feeding. Erythromycin Counseling:  I discussed with the patient the risks of erythromycin including but not limited to GI upset, allergic reaction, drug rash, diarrhea, increase in liver enzymes, and yeast infections. Spironolactone Counseling: Patient advised regarding risks of diarrhea, abdominal pain, hyperkalemia, birth defects (for female patients), liver toxicity and renal toxicity. The patient may need blood work to monitor liver and kidney function and potassium levels while on therapy. The patient verbalized understanding of the proper use and possible adverse effects of spironolactone.  All of the patient's questions and concerns were addressed. Erythromycin Pregnancy And Lactation Text: This medication is Pregnancy Category B and is considered safe during pregnancy. It is also excreted in breast milk. Bactrim Counseling:  I discussed with the patient the risks of sulfa antibiotics including but not limited to GI upset, allergic reaction, drug rash, diarrhea, dizziness, photosensitivity, and yeast infections.  Rarely, more serious reactions can occur including but not limited to aplastic anemia, agranulocytosis, methemoglobinemia, blood dyscrasias, liver or kidney failure, lung infiltrates or desquamative/blistering drug rashes. Tazorac Counseling:  Patient advised that medication is irritating and drying.  Patient may need to apply sparingly and wash off after an hour before eventually leaving it on overnight.  The patient verbalized understanding of the proper use and possible adverse effects of tazorac.  All of the patient's questions and concerns were addressed. Minocycline Pregnancy And Lactation Text: This medication is Pregnancy Category D and not consider safe during pregnancy. It is also excreted in breast milk. Doxycycline Pregnancy And Lactation Text: This medication is Pregnancy Category D and not consider safe during pregnancy. It is also excreted in breast milk but is considered safe for shorter treatment courses. Tazorac Pregnancy And Lactation Text: This medication is not safe during pregnancy. It is unknown if this medication is excreted in breast milk. Doxycycline Counseling:  Patient counseled regarding possible photosensitivity and increased risk for sunburn.  Patient instructed to avoid sunlight, if possible.  When exposed to sunlight, patients should wear protective clothing, sunglasses, and sunscreen.  The patient was instructed to call the office immediately if the following severe adverse effects occur:  hearing changes, easy bruising/bleeding, severe headache, or vision changes.  The patient verbalized understanding of the proper use and possible adverse effects of doxycycline.  All of the patient's questions and concerns were addressed. Benzoyl Peroxide Counseling: Patient counseled that medicine may cause skin irritation and bleach clothing.  In the event of skin irritation, the patient was advised to reduce the amount of the drug applied or use it less frequently.   The patient verbalized understanding of the proper use and possible adverse effects of benzoyl peroxide.  All of the patient's questions and concerns were addressed. Topical Retinoid counseling:  Patient advised to apply a pea-sized amount only at bedtime and wait 30 minutes after washing their face before applying.  If too drying, patient may add a non-comedogenic moisturizer. The patient verbalized understanding of the proper use and possible adverse effects of retinoids.  All of the patient's questions and concerns were addressed. High Dose Vitamin A Pregnancy And Lactation Text: High dose vitamin A therapy is contraindicated during pregnancy and breast feeding. Birth Control Pills Counseling: Birth Control Pill Counseling: I discussed with the patient the potential side effects of OCPs including but not limited to increased risk of stroke, heart attack, thrombophlebitis, deep venous thrombosis, hepatic adenomas, breast changes, GI upset, headaches, and depression.  The patient verbalized understanding of the proper use and possible adverse effects of OCPs. All of the patient's questions and concerns were addressed. no DISPLAY PLAN FREE TEXT

## 2024-11-12 PROCEDURE — G0463: CPT

## 2024-11-12 PROCEDURE — 85027 COMPLETE CBC AUTOMATED: CPT

## 2024-11-12 PROCEDURE — 80048 BASIC METABOLIC PNL TOTAL CA: CPT

## 2024-11-12 PROCEDURE — 36415 COLL VENOUS BLD VENIPUNCTURE: CPT

## 2024-11-19 ENCOUNTER — RESULT REVIEW (OUTPATIENT)
Age: 84
End: 2024-11-19

## 2024-11-19 ENCOUNTER — TRANSCRIPTION ENCOUNTER (OUTPATIENT)
Age: 84
End: 2024-11-19

## 2024-11-19 ENCOUNTER — OUTPATIENT (OUTPATIENT)
Dept: OUTPATIENT SERVICES | Facility: HOSPITAL | Age: 84
LOS: 1 days | End: 2024-11-19
Payer: MEDICARE

## 2024-11-19 ENCOUNTER — APPOINTMENT (OUTPATIENT)
Dept: SURGICAL ONCOLOGY | Facility: HOSPITAL | Age: 84
End: 2024-11-19

## 2024-11-19 VITALS
DIASTOLIC BLOOD PRESSURE: 82 MMHG | OXYGEN SATURATION: 98 % | WEIGHT: 159.61 LBS | HEIGHT: 63 IN | HEART RATE: 84 BPM | SYSTOLIC BLOOD PRESSURE: 189 MMHG | RESPIRATION RATE: 14 BRPM | TEMPERATURE: 97 F

## 2024-11-19 VITALS
HEART RATE: 84 BPM | TEMPERATURE: 98 F | OXYGEN SATURATION: 98 % | SYSTOLIC BLOOD PRESSURE: 152 MMHG | DIASTOLIC BLOOD PRESSURE: 80 MMHG | RESPIRATION RATE: 16 BRPM

## 2024-11-19 DIAGNOSIS — C44.319 BASAL CELL CARCINOMA OF SKIN OF OTHER PARTS OF FACE: ICD-10-CM

## 2024-11-19 DIAGNOSIS — K08.409 PARTIAL LOSS OF TEETH, UNSPECIFIED CAUSE, UNSPECIFIED CLASS: Chronic | ICD-10-CM

## 2024-11-19 PROCEDURE — 21016 RESECT FACE/SCALP TUM 2 CM/>: CPT

## 2024-11-19 PROCEDURE — 14021 TIS TRNFR S/A/L 10.1-30 SQCM: CPT | Mod: XP

## 2024-11-19 PROCEDURE — 11622 EXC S/N/H/F/G MAL+MRG 1.1-2: CPT

## 2024-11-19 PROCEDURE — 88305 TISSUE EXAM BY PATHOLOGIST: CPT

## 2024-11-19 PROCEDURE — 88305 TISSUE EXAM BY PATHOLOGIST: CPT | Mod: 26

## 2024-11-19 RX ORDER — ACETAMINOPHEN 500MG 500 MG/1
650 TABLET, COATED ORAL EVERY 6 HOURS
Refills: 0 | Status: DISCONTINUED | OUTPATIENT
Start: 2024-11-19 | End: 2024-12-03

## 2024-11-19 RX ORDER — CEPHALEXIN 500 MG
1 CAPSULE ORAL
Qty: 14 | Refills: 0
Start: 2024-11-19 | End: 2024-11-25

## 2024-11-19 RX ORDER — ONDANSETRON HYDROCHLORIDE 4 MG/1
4 TABLET, FILM COATED ORAL ONCE
Refills: 0 | Status: DISCONTINUED | OUTPATIENT
Start: 2024-11-19 | End: 2024-11-19

## 2024-11-19 RX ORDER — HYDROMORPHONE HYDROCHLORIDE 2 MG/1
0.5 TABLET ORAL
Refills: 0 | Status: DISCONTINUED | OUTPATIENT
Start: 2024-11-19 | End: 2024-11-19

## 2024-11-19 RX ADMIN — Medication 50 MILLILITER(S): at 11:49

## 2024-11-19 NOTE — ASU DISCHARGE PLAN (ADULT/PEDIATRIC) - ASU DC SPECIAL INSTRUCTIONSFT
Do not lie flat  elevate head    May remove bandages in 24 hours.  keep incision/scalp clean and dry

## 2024-11-19 NOTE — BRIEF OPERATIVE NOTE - NSICDXBRIEFPROCEDURE_GEN_ALL_CORE_FT
PROCEDURES:  Excision of malignant lesion of scalp, 3.6 to 4.0cm 19-Nov-2024 15:33:24 BCC Selena Almodovar  Creation, flap, fasciocutaneous, scalp 19-Nov-2024 15:37:50  Selena Almodovar  
PROCEDURES:  Excision of malignant lesion of scalp, 3.6 to 4.0cm 19-Nov-2024 15:33:24 BCC Selena Almodovar A

## 2024-11-19 NOTE — BRIEF OPERATIVE NOTE - NSICDXBRIEFPOSTOP_GEN_ALL_CORE_FT
POST-OP DIAGNOSIS:  Basal cell carcinoma of scalp 19-Nov-2024 15:33:53  Selena Almodovar A  
POST-OP DIAGNOSIS:  Basal cell carcinoma of scalp 19-Nov-2024 15:33:53  Selena Almodovar A

## 2024-11-19 NOTE — ASU DISCHARGE PLAN (ADULT/PEDIATRIC) - ***IN THE EVENT THAT YOU DEVELOP A COMPLICATION AND YOU ARE UNABLE TO REACH YOUR OWN PHYSICIAN, YOU MAY CONTACT:
Statement Selected
PAST SURGICAL HISTORY:  C Section 1994     Cataract extracted with lens implant 1998 Right    Cervical Spinal Stenosis surgery x2 (01/2002, 7/2002)     Cholecystectomy/appendectomy @ age 26     D&C 2008 hysteroscopy, endometrial hyperplasia, 2009    D&C x2 1980's     Endometrial biopsy 12/02/09     H/O colonoscopy 1998    H/O laser iridotomy left eye, 2016    S/P appendectomy     S/P cholecystectomy     S/P total abdominal hysterectomy and bilateral salpingo-oophorectomy     Tonsillectomy as a child

## 2024-11-19 NOTE — ASU DISCHARGE PLAN (ADULT/PEDIATRIC) - FINANCIAL ASSISTANCE
[facility] provides services at a reduced cost to those who are determined to be eligible through [facility]’s financial assistance program. Information regarding [facility]’s financial assistance program can be found by going to [email or web address] or by calling [phone].

## 2024-11-19 NOTE — ASU DISCHARGE PLAN (ADULT/PEDIATRIC) - PROVIDER TOKENS
PROVIDER:[TOKEN:[[id number]],FOLLOWUP:[2 weeks]],PROVIDER:[TOKEN:[[id number]],FOLLOWUP:[1 week]]

## 2024-11-19 NOTE — BRIEF OPERATIVE NOTE - NSICDXBRIEFPREOP_GEN_ALL_CORE_FT
PRE-OP DIAGNOSIS:  Basal cell carcinoma of scalp 19-Nov-2024 15:33:44  Selena Almodovar A  
PRE-OP DIAGNOSIS:  Basal cell carcinoma of scalp 19-Nov-2024 15:33:44  Selena Almodovar A

## 2024-11-19 NOTE — ASU DISCHARGE PLAN (ADULT/PEDIATRIC) - CARE PROVIDER_API CALL
Carl Estes  Surgery  [address] [place] [id number]  Phone: [phone]  Fax: [phone]  Follow Up Time: 2 weeks    Jorge Luis Nichols  Plastic Surgery  [address] [place] [id number]  Phone: [phone]  Fax: [phone]  Follow Up Time: 1 week

## 2024-11-19 NOTE — BRIEF OPERATIVE NOTE - BRIEF OP NOTE DRAINS
Chief Complaint   Patient presents with    Knee Pain     LEft knee MRI follow up.          HPI:    Patient is 43 y.o. female complaining of a noncardiac twisting injury while at work 3 days prior. 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 Patient continued to have pain and swelling that moment. She admits to stiffness, deep, aching pain, swelling, difficulty with stairs and ambulating far distances. She admits to gross instability specifically in her right knee. Previous treatments include brace, ice, crutches. Returns for MRI. ROS:    Skin: (-) rash,(-) psoriasis,(-) eczema, (-)skin cancer. Neurologic: (-)numbness, (-)tingling, (-)headaches, (-) LOC. Cardiovascular: (-) Chest pain, (-) swelling in legs/feet, (-) SOB, (-) cramping in legs/feet with walking. All other review of systems negative except stated above or in HPI      Past Medical History:   Diagnosis Date    Arthritis     [facility]bral artery occlusion [facility]     Depression     Factor VIII deficiency ([facility]     H. pylori infection 2018    per pt. 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 , Disp: 30 tablet, Rfl: 2    ondansetron (ZOFRAN ODT) 4 MG disintegrating tablet, Take 1 tablet by mouth every 8 hours as needed for Nausea or Vomiting, Disp: 10 tablet, Rfl:
no
no

## 2024-11-19 NOTE — ASU PATIENT PROFILE, ADULT - FALL HARM RISK - UNIVERSAL INTERVENTIONS
Bed in lowest position, wheels locked, appropriate side rails in place/Call bell, personal items and telephone in reach/Instruct patient to call for assistance before getting out of bed or chair/Non-slip footwear when patient is out of bed/[place] to call system/Physically safe environment - no spills, clutter or unnecessary equipment/Purposeful Proactive Rounding/Room/bathroom lighting operational, light cord in reach

## 2024-11-22 ENCOUNTER — NON-APPOINTMENT (OUTPATIENT)
Age: 84
End: 2024-11-22

## 2024-11-29 LAB — SURGICAL PATHOLOGY STUDY: SIGNIFICANT CHANGE UP

## 2025-04-18 ENCOUNTER — EMERGENCY (EMERGENCY)
Facility: HOSPITAL | Age: 85
LOS: 1 days | End: 2025-04-18
Attending: STUDENT IN AN ORGANIZED HEALTH CARE EDUCATION/TRAINING PROGRAM | Admitting: STUDENT IN AN ORGANIZED HEALTH CARE EDUCATION/TRAINING PROGRAM
Payer: MEDICARE

## 2025-04-18 VITALS
OXYGEN SATURATION: 97 % | SYSTOLIC BLOOD PRESSURE: 158 MMHG | DIASTOLIC BLOOD PRESSURE: 73 MMHG | WEIGHT: 160.72 LBS | HEIGHT: 66 IN | RESPIRATION RATE: 19 BRPM | TEMPERATURE: 98 F | HEART RATE: 75 BPM

## 2025-04-18 VITALS
TEMPERATURE: 99 F | OXYGEN SATURATION: 97 % | DIASTOLIC BLOOD PRESSURE: 75 MMHG | RESPIRATION RATE: 16 BRPM | SYSTOLIC BLOOD PRESSURE: 160 MMHG | HEART RATE: 86 BPM

## 2025-04-18 DIAGNOSIS — K08.409 PARTIAL LOSS OF TEETH, UNSPECIFIED CAUSE, UNSPECIFIED CLASS: Chronic | ICD-10-CM

## 2025-04-18 LAB
ALBUMIN SERPL ELPH-MCNC: 4 G/DL — SIGNIFICANT CHANGE UP (ref 3.3–5)
ALP SERPL-CCNC: 115 U/L — SIGNIFICANT CHANGE UP (ref 40–120)
ALT FLD-CCNC: 36 U/L — SIGNIFICANT CHANGE UP (ref 12–78)
ANION GAP SERPL CALC-SCNC: 5 MMOL/L — SIGNIFICANT CHANGE UP (ref 5–17)
APPEARANCE UR: CLEAR — SIGNIFICANT CHANGE UP
AST SERPL-CCNC: 30 U/L — SIGNIFICANT CHANGE UP (ref 15–37)
BASOPHILS # BLD AUTO: 0.02 K/UL — SIGNIFICANT CHANGE UP (ref 0–0.2)
BASOPHILS NFR BLD AUTO: 0.3 % — SIGNIFICANT CHANGE UP (ref 0–2)
BILIRUB SERPL-MCNC: 0.7 MG/DL — SIGNIFICANT CHANGE UP (ref 0.2–1.2)
BILIRUB UR-MCNC: NEGATIVE — SIGNIFICANT CHANGE UP
BUN SERPL-MCNC: 20 MG/DL — SIGNIFICANT CHANGE UP (ref 7–23)
CALCIUM SERPL-MCNC: 9.6 MG/DL — SIGNIFICANT CHANGE UP (ref 8.5–10.1)
CHLORIDE SERPL-SCNC: 106 MMOL/L — SIGNIFICANT CHANGE UP (ref 96–108)
CO2 SERPL-SCNC: 28 MMOL/L — SIGNIFICANT CHANGE UP (ref 22–31)
COLOR SPEC: YELLOW — SIGNIFICANT CHANGE UP
CREAT SERPL-MCNC: 1 MG/DL — SIGNIFICANT CHANGE UP (ref 0.5–1.3)
DIFF PNL FLD: ABNORMAL
EGFR: 74 ML/MIN/1.73M2 — SIGNIFICANT CHANGE UP
EGFR: 74 ML/MIN/1.73M2 — SIGNIFICANT CHANGE UP
EOSINOPHIL # BLD AUTO: 0.06 K/UL — SIGNIFICANT CHANGE UP (ref 0–0.5)
EOSINOPHIL NFR BLD AUTO: 0.9 % — SIGNIFICANT CHANGE UP (ref 0–6)
FLUAV AG NPH QL: SIGNIFICANT CHANGE UP
FLUBV AG NPH QL: SIGNIFICANT CHANGE UP
GLUCOSE SERPL-MCNC: 98 MG/DL — SIGNIFICANT CHANGE UP (ref 70–99)
GLUCOSE UR QL: NEGATIVE MG/DL — SIGNIFICANT CHANGE UP
HCT VFR BLD CALC: 48.7 % — SIGNIFICANT CHANGE UP (ref 39–50)
HGB BLD-MCNC: 16.1 G/DL — SIGNIFICANT CHANGE UP (ref 13–17)
IMM GRANULOCYTES NFR BLD AUTO: 0.3 % — SIGNIFICANT CHANGE UP (ref 0–0.9)
KETONES UR-MCNC: NEGATIVE MG/DL — SIGNIFICANT CHANGE UP
LEUKOCYTE ESTERASE UR-ACNC: NEGATIVE — SIGNIFICANT CHANGE UP
LYMPHOCYTES # BLD AUTO: 1.87 K/UL — SIGNIFICANT CHANGE UP (ref 1–3.3)
LYMPHOCYTES # BLD AUTO: 26.6 % — SIGNIFICANT CHANGE UP (ref 13–44)
MAGNESIUM SERPL-MCNC: 2.3 MG/DL — SIGNIFICANT CHANGE UP (ref 1.6–2.6)
MCHC RBC-ENTMCNC: 26.9 PG — LOW (ref 27–34)
MCHC RBC-ENTMCNC: 33.1 G/DL — SIGNIFICANT CHANGE UP (ref 32–36)
MCV RBC AUTO: 81.4 FL — SIGNIFICANT CHANGE UP (ref 80–100)
MONOCYTES # BLD AUTO: 0.36 K/UL — SIGNIFICANT CHANGE UP (ref 0–0.9)
MONOCYTES NFR BLD AUTO: 5.1 % — SIGNIFICANT CHANGE UP (ref 2–14)
NEUTROPHILS # BLD AUTO: 4.71 K/UL — SIGNIFICANT CHANGE UP (ref 1.8–7.4)
NEUTROPHILS NFR BLD AUTO: 66.8 % — SIGNIFICANT CHANGE UP (ref 43–77)
NITRITE UR-MCNC: NEGATIVE — SIGNIFICANT CHANGE UP
NRBC BLD AUTO-RTO: 0 /100 WBCS — SIGNIFICANT CHANGE UP (ref 0–0)
PH UR: 6.5 — SIGNIFICANT CHANGE UP (ref 5–8)
PLATELET # BLD AUTO: 196 K/UL — SIGNIFICANT CHANGE UP (ref 150–400)
POTASSIUM SERPL-MCNC: 4.5 MMOL/L — SIGNIFICANT CHANGE UP (ref 3.5–5.3)
POTASSIUM SERPL-SCNC: 4.5 MMOL/L — SIGNIFICANT CHANGE UP (ref 3.5–5.3)
PROT SERPL-MCNC: 7.4 G/DL — SIGNIFICANT CHANGE UP (ref 6–8.3)
PROT UR-MCNC: SIGNIFICANT CHANGE UP MG/DL
RBC # BLD: 5.98 M/UL — HIGH (ref 4.2–5.8)
RBC # FLD: 13.1 % — SIGNIFICANT CHANGE UP (ref 10.3–14.5)
RSV RNA NPH QL NAA+NON-PROBE: SIGNIFICANT CHANGE UP
SARS-COV-2 RNA SPEC QL NAA+PROBE: SIGNIFICANT CHANGE UP
SODIUM SERPL-SCNC: 139 MMOL/L — SIGNIFICANT CHANGE UP (ref 135–145)
SOURCE RESPIRATORY: SIGNIFICANT CHANGE UP
SP GR SPEC: 1.01 — SIGNIFICANT CHANGE UP (ref 1–1.03)
TSH SERPL-MCNC: 1.35 UIU/ML — SIGNIFICANT CHANGE UP (ref 0.36–3.74)
UROBILINOGEN FLD QL: 1 MG/DL — SIGNIFICANT CHANGE UP (ref 0.2–1)
WBC # BLD: 7.04 K/UL — SIGNIFICANT CHANGE UP (ref 3.8–10.5)
WBC # FLD AUTO: 7.04 K/UL — SIGNIFICANT CHANGE UP (ref 3.8–10.5)

## 2025-04-18 PROCEDURE — 93010 ELECTROCARDIOGRAM REPORT: CPT

## 2025-04-18 PROCEDURE — 81001 URINALYSIS AUTO W/SCOPE: CPT

## 2025-04-18 PROCEDURE — 80053 COMPREHEN METABOLIC PANEL: CPT

## 2025-04-18 PROCEDURE — 71046 X-RAY EXAM CHEST 2 VIEWS: CPT

## 2025-04-18 PROCEDURE — 99285 EMERGENCY DEPT VISIT HI MDM: CPT

## 2025-04-18 PROCEDURE — 84480 ASSAY TRIIODOTHYRONINE (T3): CPT

## 2025-04-18 PROCEDURE — 70450 CT HEAD/BRAIN W/O DYE: CPT | Mod: MC

## 2025-04-18 PROCEDURE — 87637 SARSCOV2&INF A&B&RSV AMP PRB: CPT

## 2025-04-18 PROCEDURE — 84439 ASSAY OF FREE THYROXINE: CPT

## 2025-04-18 PROCEDURE — 36415 COLL VENOUS BLD VENIPUNCTURE: CPT

## 2025-04-18 PROCEDURE — 84436 ASSAY OF TOTAL THYROXINE: CPT

## 2025-04-18 PROCEDURE — 84443 ASSAY THYROID STIM HORMONE: CPT

## 2025-04-18 PROCEDURE — 71046 X-RAY EXAM CHEST 2 VIEWS: CPT | Mod: 26

## 2025-04-18 PROCEDURE — 93005 ELECTROCARDIOGRAM TRACING: CPT

## 2025-04-18 PROCEDURE — 99285 EMERGENCY DEPT VISIT HI MDM: CPT | Mod: 25

## 2025-04-18 PROCEDURE — 85025 COMPLETE CBC W/AUTO DIFF WBC: CPT

## 2025-04-18 PROCEDURE — 83735 ASSAY OF MAGNESIUM: CPT

## 2025-04-18 PROCEDURE — 70450 CT HEAD/BRAIN W/O DYE: CPT | Mod: 26

## 2025-04-18 RX ADMIN — Medication 1000 MILLILITER(S): at 19:08

## 2025-04-18 NOTE — ED PROVIDER NOTE - OBJECTIVE STATEMENT
Patient with a past medical history of hypertension, hyperlipidemia who is here with his daughter is presenting with concern for hallucinations.  They stated over the past week he is endorsing some people in his house that are not there.  Typically it is his family members that he sees after they leave his home.  Daughter states similar this happened to him once before after he had a surgical procedure and anesthesia but that did not happen this time.  Patient without recent fevers, illnesses, cough, shortness of breath, abdominal discomfort, nausea, vomiting, dysuria or polyuria.  No recent falls or trauma.  States that they spoke to his primary care doctor who advised him to come to ED for evaluation.

## 2025-04-18 NOTE — ED ADULT NURSE NOTE - NSFALLRISKINTERV_ED_ALL_ED
Assistance OOB with selected safe patient handling equipment if applicable/Communicate fall risk and risk factors to all staff, patient, and family/Monitor gait and stability/Provide patient with walking aids/Provide visual cue: yellow wristband, yellow gown, etc/Reinforce activity limits and safety measures with patient and family/Toileting schedule using arm’s reach rule for commode and bathroom/Call bell, personal items and telephone in reach/Instruct patient to call for assistance before getting out of bed/chair/stretcher/Non-slip footwear applied when patient is off stretcher/[place] to call system/Physically safe environment - no spills, clutter or unnecessary equipment/Purposeful Proactive Rounding/Room/bathroom lighting operational, light cord in reach

## 2025-04-18 NOTE — ED PROVIDER NOTE - PROGRESS NOTE DETAILS
Patient reevaluated, continues to feel well here.  I discussed all results with patient daughter at bedside.  She is comfortable discharge at this time and follow-up with a primary care doctor.  I discussed for any worsening symptoms they can return to the ER.  However at this time, we will plan for discharge.  Plan to discharge patient. Return to ED precautions were discussed with the patient/family. All questions were answered. Alexis Bhatt MD. We did also discuss the air in the intestines.  Patient states he has been intermittently constipated recently.  Alexsi Bhatt MD.

## 2025-04-18 NOTE — ED ADULT TRIAGE NOTE - GLASGOW COMA SCALE: SCORE, MLM
Constitutional: Awake, alert, in no acute distress  Eyes: no scleral icterus  HENT: normocephalic, atraumatic, moist oral mucosa  Neck: supple  CV: RRR, no murmur  Pulm: non-labored respirations, CTAB  Abdomen: soft, non-tender, non-distended  Extremities: no edema, no deformity  Skin: no rash, no jaundice  Neuro: AAOx3, moving all extremities equally, no tremors 15

## 2025-04-18 NOTE — ED ADULT NURSE NOTE - NSICDXFAMHXNEG_GEN_ALL
follow up here for tx lsc/keloids chest OBJECTIVE: 3 raised pink plaques chest ASSESSMENT; lsc component keloid PLAN: The lesions were injected with kenalog 40mg/ml, 0.3ml Sulema Hernandez M.D.       n/a

## 2025-04-18 NOTE — ED ADULT NURSE NOTE - CAS DISCH TRANSFER METHOD
I have reviewed the notes, assessments, and/or procedures performed this visit, and I concur with the documentation.    Filippo Del Toro M.D.    Hospital Medicine Assistant Staff  [facility]             Private car

## 2025-04-18 NOTE — ED ADULT NURSE NOTE - CAS EDN DISCHARGE INTERVENTIONS
SW received call from [facility] with [facility] and they will no be able to meet pt's needs.    Nannette Quintero, YANICK  [id number]   IV discontinued, cath removed intact

## 2025-04-18 NOTE — ED ADULT NURSE REASSESSMENT NOTE - NS ED NURSE REASSESS COMMENT FT1
Report received from Kvng RN. Pt lying awake in stretcher A&O4. Pt denies any pain at this time.  No other signs of distress noted. Plan of care continued.

## 2025-04-18 NOTE — ED PROVIDER NOTE - PHYSICAL EXAMINATION
Constitutional: Awake, Alert, non-toxic. No acute distress.  HEAD: Normocephalic, atraumatic.   EYES: PERRL, EOM intact, conjunctiva and sclera are clear bilaterally.  ENT: External ears normal. No rhinorrhea, no tracheal deviation   NECK: Supple, non-tender  CARDIOVASCULAR: regular rate and rhythm.  RESPIRATORY: Normal respiratory effort; breath sounds CTAB, no wheezes, rhonchi, or rales. Speaking in full sentences. No accessory muscle use.   ABDOMEN: Soft; non-tender, non-distended. No rebound or guarding.   MSK:  no lower extremity edema, no deformities  SKIN: Warm, dry  NEURO: A&O x3. Sensory and motor functions are grossly intact. Speech is normal. No facial droop. CN 2-12 in tact. No facial droop. Normal finger to nose. Negative pronator drift. no drift of lower extremities. 5/5 strength in bilateral upper and lower extremities. Sensation in tact to light touch in bilateral upper and lower extremities.  PSYCH: Appearance and judgement seem appropriate for gender and age.

## 2025-04-18 NOTE — ED PROVIDER NOTE - NSFOLLOWUPINSTRUCTIONS_ED_ALL_ED_FT
Hallucinations    WHAT YOU NEED TO KNOW:    What are hallucinations? Hallucinations are things you see, hear, feel, taste, or smell that seem real but are not. Some hallucinations are temporary. Hallucinations that continue, interfere with daily activities, or worsen may be a sign of a serious medical or mental condition that needs treatment.    What are the types of hallucinations?    Auditory means you hear things, such as music, buzzing, or ringing. You may hear voices even though no one else is in the room. The voices may say negative things about you or tell you to harm yourself or others. You may hear the voice of a loved one who recently passed away.    Visual means you see things, such as a person or object that is not real. Flashes of light or shapes are other examples. Another example is an object that is real but looks different to you than it does to others.    Tactile means you feel things, such as an object that is not real. You may feel like something is touching you or is crawling on or in your skin. You may also feel that your body is being cut or torn. You may feel like something is in a body part, such as your stomach, even though tests show nothing is there.    Olfactory means you smell something that is not real. The smell may make you gag or choke if it is not pleasant. You may smell something good, such as food or flowers. Olfactory hallucinations may be a sign of a serious medical condition that needs treatment, such as a brain tumor.    Gustatory means you taste things that are not real. You may taste something even when your mouth is empty. Your food may taste rotten or sour even though others eating the same food think it tastes fine.  What increases the risk for hallucinations?    A mental condition, such as dementia or schizophrenia    Drug or alcohol abuse or withdrawal, or a reaction to a medication    A fever, infection, or heatstroke    A medical condition, such as thyroid problems or a brain tumor    A neurological condition, such as migraines or seizures    An eye condition, such as glaucoma or macular degeneration    An inner ear condition or infection    Low blood sugar or sodium levels    Emotional problems, such as from the recent loss of a loved one, PTSD, or abuse    Not enough sleep, or being between asleep and awake but still dreaming  How is the cause of hallucinations diagnosed? Your healthcare provider will ask when the hallucinations started. Tell your provider about any recent stress in your life, such as the death of a loved one. Include any trouble sleeping or recent illness. Your provider will also ask about medicines you take and if you drink alcohol or use drugs.    Blood or urine tests may be used to check for infection, or for alcohol or drugs. The tests may also be used to check thyroid or liver function.    A CT or MRI may be used to check for an injury, tumor, or infection.  How are hallucinations treated?    Medicines may be given to stop the hallucinations, reduce anxiety, or relax your muscles.    A behavior therapist may help you recognize and manage hallucinations. The therapist may teach methods such as the talk-through method. You will learn to tell yourself that the hallucination is not real and what to do when it ends.  Call 911 if you or someone else notices any of the following:    You want to harm yourself or someone else.    You hear voices telling you to harm yourself or someone else.    You have a seizure.    You are confused, do not know where you are, or are not making sense when you speak.  When should I seek immediate care?    Your hallucinations worsen or return after treatment.    You vomit several times in a row.    Your heartbeat or breathing is faster or slower than usual.    You have trouble breathing or shortness of breath.  When should I contact my healthcare provider?    You have new hallucinations.    You have questions or concerns about your condition or care.  CARE AGREEMENT:    You have the right to help plan your care. Learn about your health condition and how it may be treated. Discuss treatment options with your healthcare providers to decide what care you want to receive. You always have the right to refuse treatment.

## 2025-04-18 NOTE — ED ADULT NURSE NOTE - OBJECTIVE STATEMENT
pt's daughter states that pt has been having visual hallucinations for the past week. pt's daughter states that pt gets hallucinations when he gets sick or receives general anesthesia. pt in no acute distress. pt updated on plan of care.

## 2025-04-18 NOTE — ED PROVIDER NOTE - PATIENT PORTAL LINK FT
You can access the FollowMyHealth Patient Portal offered by [facility] by registering at the following website: [email or web address]. By joining [facility]’s FollowMyHealth portal, you will also be able to view your health information using other applications (apps) compatible with our system.

## 2025-04-18 NOTE — ED PROVIDER NOTE - CARE PROVIDER_API CALL
Vidal Ryan  Internal Medicine  [address] [place] [id number]  Phone: [phone]  Fax: [phone]  Follow Up Time: 1-3 Days

## 2025-04-18 NOTE — ED PROVIDER NOTE - CLINICAL SUMMARY MEDICAL DECISION MAKING FREE TEXT BOX
Patient well-appearing here, currently alert and oriented x 3.  Not currently having hallucinations.  Given age will screen for infection such as possible COVID or flu versus UTI versus pneumonia.  Will check for thyroid dysfunction as well.  Patient with a afebrile rectal temperature.  Will check screening CT head though he has no localizing neurologic deficits to suggest acute CVA.  I discussed with family that this could be developing dementia.  Family did state that he has not been sleeping well recently which could be contributing to his symptoms.  Will check lab work, imaging, reassess.  ECG per my independent interpretation shows a rate of 81, normal sinus rhythm with a normal axis, no ST segment elevation consistent with ischemia.

## 2025-04-18 NOTE — ED PROVIDER NOTE - PRO INTERPRETER NEED 2
Detail Level: Zone Chemical Peel: Elias Treatment Number: 2 Prep: Cleansed with simply clean..The treated area was degreased with acetone, and vaseline was applied for protection of mucous membranes. Post-Care Instructions: I reviewed with the patient in detail post-care instructions. Patient should avoid sun exposure and wear sun protection. Consent: Prior to the procedure, written consent was obtained and risks were reviewed, including but not limited to: redness, peeling, blistering, pigmentary change, scarring, infection, and pain. Post Peel Care: After the procedure, phyto corrective was applied to the treated areas. UV clear was applied and post-care instructions were reviewed with the patient. English

## 2025-04-19 PROBLEM — H91.90 UNSPECIFIED HEARING LOSS, UNSPECIFIED EAR: Chronic | Status: ACTIVE | Noted: 2024-11-08

## 2025-04-19 LAB
T3 SERPL-MCNC: 112 NG/DL — SIGNIFICANT CHANGE UP (ref 80–200)
T4 AB SER-ACNC: 8.2 UG/DL — SIGNIFICANT CHANGE UP (ref 4.6–12)
T4 FREE SERPL-MCNC: 1.2 NG/DL — SIGNIFICANT CHANGE UP (ref 0.9–1.8)

## 2025-06-09 ENCOUNTER — INPATIENT (INPATIENT)
Facility: HOSPITAL | Age: 85
LOS: 15 days | Discharge: NURSING, REHAB OR LONG-TERM CARE | End: 2025-06-25
Attending: INTERNAL MEDICINE | Admitting: INTERNAL MEDICINE
Payer: MEDICARE

## 2025-06-09 VITALS
RESPIRATION RATE: 18 BRPM | SYSTOLIC BLOOD PRESSURE: 162 MMHG | DIASTOLIC BLOOD PRESSURE: 72 MMHG | HEIGHT: 66 IN | OXYGEN SATURATION: 100 % | HEART RATE: 72 BPM | TEMPERATURE: 98 F | WEIGHT: 160.06 LBS

## 2025-06-09 DIAGNOSIS — I10 ESSENTIAL (PRIMARY) HYPERTENSION: ICD-10-CM

## 2025-06-09 DIAGNOSIS — R44.1 VISUAL HALLUCINATIONS: ICD-10-CM

## 2025-06-09 DIAGNOSIS — R44.3 HALLUCINATIONS, UNSPECIFIED: ICD-10-CM

## 2025-06-09 DIAGNOSIS — K08.409 PARTIAL LOSS OF TEETH, UNSPECIFIED CAUSE, UNSPECIFIED CLASS: Chronic | ICD-10-CM

## 2025-06-09 DIAGNOSIS — Z29.9 ENCOUNTER FOR PROPHYLACTIC MEASURES, UNSPECIFIED: ICD-10-CM

## 2025-06-09 DIAGNOSIS — E78.5 HYPERLIPIDEMIA, UNSPECIFIED: ICD-10-CM

## 2025-06-09 LAB
ALBUMIN SERPL ELPH-MCNC: 4.7 G/DL — SIGNIFICANT CHANGE UP (ref 3.3–5)
ALP SERPL-CCNC: 118 U/L — SIGNIFICANT CHANGE UP (ref 40–120)
ALT FLD-CCNC: 34 U/L — SIGNIFICANT CHANGE UP (ref 4–41)
ANION GAP SERPL CALC-SCNC: 17 MMOL/L — HIGH (ref 7–14)
APPEARANCE UR: CLEAR — SIGNIFICANT CHANGE UP
AST SERPL-CCNC: 32 U/L — SIGNIFICANT CHANGE UP (ref 4–40)
BACTERIA # UR AUTO: NEGATIVE /HPF — SIGNIFICANT CHANGE UP
BASOPHILS # BLD AUTO: 0.01 K/UL — SIGNIFICANT CHANGE UP (ref 0–0.2)
BASOPHILS NFR BLD AUTO: 0.1 % — SIGNIFICANT CHANGE UP (ref 0–2)
BILIRUB SERPL-MCNC: 0.8 MG/DL — SIGNIFICANT CHANGE UP (ref 0.2–1.2)
BILIRUB UR-MCNC: NEGATIVE — SIGNIFICANT CHANGE UP
BUN SERPL-MCNC: 20 MG/DL — SIGNIFICANT CHANGE UP (ref 7–23)
CALCIUM SERPL-MCNC: 9.8 MG/DL — SIGNIFICANT CHANGE UP (ref 8.4–10.5)
CAST: 0 /LPF — SIGNIFICANT CHANGE UP (ref 0–4)
CHLORIDE SERPL-SCNC: 103 MMOL/L — SIGNIFICANT CHANGE UP (ref 98–107)
CO2 SERPL-SCNC: 21 MMOL/L — LOW (ref 22–31)
COLOR SPEC: YELLOW — SIGNIFICANT CHANGE UP
CREAT SERPL-MCNC: 0.93 MG/DL — SIGNIFICANT CHANGE UP (ref 0.5–1.3)
DIFF PNL FLD: ABNORMAL
EGFR: 81 ML/MIN/1.73M2 — SIGNIFICANT CHANGE UP
EGFR: 81 ML/MIN/1.73M2 — SIGNIFICANT CHANGE UP
EOSINOPHIL # BLD AUTO: 0.03 K/UL — SIGNIFICANT CHANGE UP (ref 0–0.5)
EOSINOPHIL NFR BLD AUTO: 0.4 % — SIGNIFICANT CHANGE UP (ref 0–6)
FOLATE SERPL-MCNC: 16.8 NG/ML — SIGNIFICANT CHANGE UP (ref 3.1–17.5)
GAS PNL BLDV: SIGNIFICANT CHANGE UP
GLUCOSE SERPL-MCNC: 96 MG/DL — SIGNIFICANT CHANGE UP (ref 70–99)
GLUCOSE UR QL: NEGATIVE MG/DL — SIGNIFICANT CHANGE UP
HCT VFR BLD CALC: 48.7 % — SIGNIFICANT CHANGE UP (ref 39–50)
HGB BLD-MCNC: 16 G/DL — SIGNIFICANT CHANGE UP (ref 13–17)
IANC: 5.61 K/UL — SIGNIFICANT CHANGE UP (ref 1.8–7.4)
IMM GRANULOCYTES NFR BLD AUTO: 0.3 % — SIGNIFICANT CHANGE UP (ref 0–0.9)
KETONES UR QL: 15 MG/DL
LEUKOCYTE ESTERASE UR-ACNC: NEGATIVE — SIGNIFICANT CHANGE UP
LYMPHOCYTES # BLD AUTO: 1.96 K/UL — SIGNIFICANT CHANGE UP (ref 1–3.3)
LYMPHOCYTES # BLD AUTO: 24.6 % — SIGNIFICANT CHANGE UP (ref 13–44)
MCHC RBC-ENTMCNC: 26.8 PG — LOW (ref 27–34)
MCHC RBC-ENTMCNC: 32.9 G/DL — SIGNIFICANT CHANGE UP (ref 32–36)
MCV RBC AUTO: 81.7 FL — SIGNIFICANT CHANGE UP (ref 80–100)
MONOCYTES # BLD AUTO: 0.33 K/UL — SIGNIFICANT CHANGE UP (ref 0–0.9)
MONOCYTES NFR BLD AUTO: 4.1 % — SIGNIFICANT CHANGE UP (ref 2–14)
NEUTROPHILS # BLD AUTO: 5.61 K/UL — SIGNIFICANT CHANGE UP (ref 1.8–7.4)
NEUTROPHILS NFR BLD AUTO: 70.5 % — SIGNIFICANT CHANGE UP (ref 43–77)
NITRITE UR-MCNC: NEGATIVE — SIGNIFICANT CHANGE UP
NRBC # BLD AUTO: 0 K/UL — SIGNIFICANT CHANGE UP (ref 0–0)
NRBC # FLD: 0 K/UL — SIGNIFICANT CHANGE UP (ref 0–0)
NRBC BLD AUTO-RTO: 0 /100 WBCS — SIGNIFICANT CHANGE UP (ref 0–0)
PH UR: 6 — SIGNIFICANT CHANGE UP (ref 5–8)
PLATELET # BLD AUTO: 187 K/UL — SIGNIFICANT CHANGE UP (ref 150–400)
POTASSIUM SERPL-MCNC: 4 MMOL/L — SIGNIFICANT CHANGE UP (ref 3.5–5.3)
POTASSIUM SERPL-SCNC: 4 MMOL/L — SIGNIFICANT CHANGE UP (ref 3.5–5.3)
PROT SERPL-MCNC: 7.8 G/DL — SIGNIFICANT CHANGE UP (ref 6–8.3)
PROT UR-MCNC: 30 MG/DL
RBC # BLD: 5.96 M/UL — HIGH (ref 4.2–5.8)
RBC # FLD: 13.3 % — SIGNIFICANT CHANGE UP (ref 10.3–14.5)
RBC CASTS # UR COMP ASSIST: 6 /HPF — HIGH (ref 0–4)
SODIUM SERPL-SCNC: 141 MMOL/L — SIGNIFICANT CHANGE UP (ref 135–145)
SP GR SPEC: 1.02 — SIGNIFICANT CHANGE UP (ref 1–1.03)
SQUAMOUS # UR AUTO: 0 /HPF — SIGNIFICANT CHANGE UP (ref 0–5)
TSH SERPL-MCNC: 1.64 UIU/ML — SIGNIFICANT CHANGE UP (ref 0.27–4.2)
UROBILINOGEN FLD QL: 1 MG/DL — SIGNIFICANT CHANGE UP (ref 0.2–1)
VIT B12 SERPL-MCNC: 614 PG/ML — SIGNIFICANT CHANGE UP (ref 200–900)
WBC # BLD: 7.96 K/UL — SIGNIFICANT CHANGE UP (ref 3.8–10.5)
WBC # FLD AUTO: 7.96 K/UL — SIGNIFICANT CHANGE UP (ref 3.8–10.5)
WBC UR QL: 0 /HPF — SIGNIFICANT CHANGE UP (ref 0–5)

## 2025-06-09 PROCEDURE — 99223 1ST HOSP IP/OBS HIGH 75: CPT

## 2025-06-09 PROCEDURE — 71045 X-RAY EXAM CHEST 1 VIEW: CPT | Mod: 26

## 2025-06-09 PROCEDURE — 70450 CT HEAD/BRAIN W/O DYE: CPT | Mod: 26

## 2025-06-09 PROCEDURE — 99285 EMERGENCY DEPT VISIT HI MDM: CPT

## 2025-06-09 RX ORDER — MELATONIN 5 MG
3 TABLET ORAL AT BEDTIME
Refills: 0 | Status: DISCONTINUED | OUTPATIENT
Start: 2025-06-09 | End: 2025-06-12

## 2025-06-09 RX ORDER — ONDANSETRON HCL/PF 4 MG/2 ML
4 VIAL (ML) INJECTION EVERY 8 HOURS
Refills: 0 | Status: DISCONTINUED | OUTPATIENT
Start: 2025-06-09 | End: 2025-06-25

## 2025-06-09 RX ORDER — ENOXAPARIN SODIUM 100 MG/ML
40 INJECTION SUBCUTANEOUS EVERY 24 HOURS
Refills: 0 | Status: DISCONTINUED | OUTPATIENT
Start: 2025-06-09 | End: 2025-06-25

## 2025-06-09 RX ORDER — MAGNESIUM, ALUMINUM HYDROXIDE 200-200 MG
30 TABLET,CHEWABLE ORAL EVERY 4 HOURS
Refills: 0 | Status: DISCONTINUED | OUTPATIENT
Start: 2025-06-09 | End: 2025-06-25

## 2025-06-09 RX ORDER — ATORVASTATIN CALCIUM 80 MG/1
10 TABLET, FILM COATED ORAL AT BEDTIME
Refills: 0 | Status: DISCONTINUED | OUTPATIENT
Start: 2025-06-09 | End: 2025-06-25

## 2025-06-09 RX ORDER — AMLODIPINE BESYLATE 10 MG/1
5 TABLET ORAL DAILY
Refills: 0 | Status: DISCONTINUED | OUTPATIENT
Start: 2025-06-09 | End: 2025-06-25

## 2025-06-09 RX ORDER — ACETAMINOPHEN 500 MG/5ML
650 LIQUID (ML) ORAL EVERY 6 HOURS
Refills: 0 | Status: DISCONTINUED | OUTPATIENT
Start: 2025-06-09 | End: 2025-06-25

## 2025-06-09 NOTE — ED ADULT TRIAGE NOTE - CHIEF COMPLAINT QUOTE
Pt. brought in by family for having auditory and visual hallucinations starting last night. Daughter states there's pus around his teeth. Denies any pain. Not sleeping well the past few days. Seen at [place] in April for hallucinations as well. hx of HTN, HLD

## 2025-06-09 NOTE — ED ADULT NURSE NOTE - NSICDXPASTMEDICALHX_GEN_ALL_CORE_FT
PAST MEDICAL HISTORY:  [place] (hard of hearing)     HTN (hypertension)     Hyperlipidemia

## 2025-06-09 NOTE — ED ADULT NURSE NOTE - NSFALLHARMRISKINTERV_ED_ALL_ED
Assistance OOB with selected safe patient handling equipment if applicable/Assistance with ambulation/Communicate risk of Fall with Harm to all staff, patient, and family/Monitor gait and stability/Monitor for mental status changes and reorient to person, place, and time, as needed/Provide visual cue: red socks, yellow wristband, yellow gown, etc/Reinforce activity limits and safety measures with patient and family/Toileting schedule using arm’s reach rule for commode and bathroom/Use of alarms - bed, stretcher, chair and/or video monitoring/Bed in lowest position, wheels locked, appropriate side rails in place/Call bell, personal items and telephone in reach/Instruct patient to call for assistance before getting out of bed/chair/stretcher/Non-slip footwear applied when patient is off stretcher/[place] to call system/Physically safe environment - no spills, clutter or unnecessary equipment/Purposeful Proactive Rounding/Room/bathroom lighting operational, light cord in reach

## 2025-06-09 NOTE — ED PROVIDER NOTE - OBJECTIVE STATEMENT
84 year old man PMH HTN, HLD presenting with hallucinations and confusion. Pe rhis daughters, yesterday he was acting strangely, seeing people who weren't there and feeling scared of them, but he knew they weren't real. Today he is still seeing things and being confused per the daugthers. had a simliar episode in april with negative workup. in between these he is at his baseline, lives well independently. denies fevers, chills, chest pain, sob, abd pain, n/v/d/c, dysuria, leg swelling, weakness, numbenss, tingling, tremors. 84 year old man PMH HTN, HLD presenting with hallucinations and confusion. Per his daughters, yesterday he was acting strangely, seeing people who weren't there and feeling scared of them, but he knew they weren't real. Today he is still seeing things and being confused per the daugthers. had a simliar episode in april with negative workup. in between these he is at his baseline, lives well independently. denies fevers, chills, chest pain, sob, abd pain, n/v/d/c, dysuria, leg swelling, weakness, numbenss, tingling, tremors.

## 2025-06-09 NOTE — ED PROVIDER NOTE - CLINICAL SUMMARY MEDICAL DECISION MAKING FREE TEXT BOX
84 year old man PMH HTN, HLD presenting with hallucinations and confusion, visual hallucinations, also reporting some sleep distrubances, denies any symptoms, falls, infectious symptoms, vitals WNL, benign physical exam, no neuro deficits concerning for acute delierium secondary to occult infection (PNA, UTI) vs head trauma / stroke vs neurological disorder (dementia, parkinsons), vs thyroid dysfunction. cbc, cmp, tsh, UA, CXR, CT head.

## 2025-06-09 NOTE — H&P ADULT - HISTORY OF PRESENT ILLNESS
84M with history of HTN, HLD, and BCC of forehead s/p excision brought in by daughter for hallucinations. Pt AAOx3 but hard of hearing, collateral obtained from daughter at bedside. Earlier this morning, pt called daughter saying he was seeing people in his room that he knows aren't there (friends and family from his earlier years back in [place]). Believes pt had a dream about that last night and woke up seeing them. Pt had a similar experience of visual hallucinations back in April, for which he went to [facility] ED for, also with insight intact, but this was after he underwent excision of his BCC with anesthesia. Both this time and last, pt was sleep deprived. Pt without any known history of dementia. He lives alone independently with daughters 10 min away. Family have not noticed any signs of dementia aside from typical signs of aging, no forgetfulness or wandering. Pt does have a longstanding history of bad dreams. No significant family history of dementia or other neurological disorders. Does wear corrective glasses and feels that he's overdue to see eye doctor to have prescription updated. No new medications.     ED Course:  Labs unrevealing. CTH without acute pathology. UA without UTI. CXR clear. Pt lives alone, concern for unsafe disposition, admitted for  neurologic evaluation and MRI.  84M with history of HTN, HLD, and BCC of forehead s/p excision brought in by daughter for hallucinations. Pt AAOx3 but hard of hearing, collateral obtained from daughter at bedside. Earlier this morning, pt called daughter saying he was seeing people in his room that he knows aren't there (friends and family from his earlier years back in [place]). Believes pt had a dream about that last night and woke up seeing them. Pt had a similar experience of visual hallucinations back in April, for which he went to [facility] ED for, also with insight intact, but this was after he underwent excision of his BCC with anesthesia. Both this time and last, pt was sleep deprived. Pt without any known history of dementia. He lives alone independently with daughters 10 min away. Family have not noticed any signs of dementia aside from typical signs of aging, no forgetfulness or wandering. Pt does have a longstanding history of bad dreams. No significant family history of dementia or other neurological disorders. Does wear corrective glasses and feels that he's overdue to see eye doctor to have prescription updated. No new medications, drug or alcohol use.     ED Course:  Labs unrevealing. CTH without acute pathology. UA without UTI. CXR clear. Pt lives alone, concern for unsafe disposition, admitted for  neurologic evaluation and MRI.

## 2025-06-09 NOTE — ED ADULT NURSE NOTE - CHIEF COMPLAINT QUOTE
Pt. brought in by family for having auditory and visual hallucinations starting last night. Daughter states there's pus around his teeth. Denies any pain. Not sleeping well the past few days. Seen at [place] in April for hallucinations as well. hx of HTN, HLD

## 2025-06-09 NOTE — H&P ADULT - PROBLEM SELECTOR PLAN 1
No known h/o MCI/dementia, with longstanding "bad dreams" with 2 episodes of visual hallucinations this year, both without confusion and with insight intact, no auditory hallucinations. 1st time was in April in setting of recent surgery/anesthesia likely delirium. This time shortly after waking from dream and in setting of sleep deprivation, potentially delirium vs sign of MCI vs dementia (Lewy body vs Parkinson's)- confusion may be absent and insight preserved in early stages. Theo Bonnet syndrome also in ddx given visual impairment though is a diagnosis of exclusion   - CTH without acute findings   - without any signs or symptoms of any localizing infections     - obtain MRI chente w/w/o contrast   - daughter requesting neurology evaluation inpatient, consult in AM   - discussed w/ daughter outpatient neuropsychiatric evaluation after discharge   - delirium precautions No known h/o MCI/dementia, with longstanding "bad dreams" with 2 episodes of visual hallucinations this year, both without confusion and with insight intact, no auditory hallucinations. 1st time was in April in setting of recent surgery/anesthesia likely delirium. This time shortly after waking from dream and in setting of sleep deprivation, potentially delirium vs sign of MCI vs dementia (Lewy body vs Parkinson's)- confusion may be absent and insight preserved in early stages. Theo Bonnet syndrome also in ddx given visual impairment though is a diagnosis of exclusion   - CTH without acute findings   - without any signs or symptoms of any localizing infections     - check TSH   - obtain MRI chente w/w/o contrast   - daughter requesting neurology evaluation inpatient, consult in AM   - discussed w/ daughter outpatient neuropsychiatric evaluation after discharge   - delirium precautions No known h/o MCI/dementia, with longstanding "bad dreams" with 2 episodes of visual hallucinations this year, both without confusion and with insight intact, no auditory hallucinations. 1st time was in April in setting of recent surgery/anesthesia likely delirium. This time shortly after waking from dream and in setting of sleep deprivation, potentially delirium vs sign of MCI vs dementia (Lewy body vs Parkinson's)- confusion may be absent and insight preserved in early stages. Theo Bonnet syndrome also in ddx given visual impairment though is a diagnosis of exclusion   - CTH without acute findings   - without any signs or symptoms of any localizing infections   - TSH, B12 wnl     - obtain MRI chente w/w/o contrast   - daughter requesting neurology evaluation inpatient, consult in AM   - discussed w/ daughter outpatient neuropsychiatric evaluation after discharge   - delirium precautions

## 2025-06-09 NOTE — H&P ADULT - ASSESSMENT
84M with history of HTN, HLD, and BCC of forehead s/p excision brought in by daughter for hallucinations without confusion, insight intact.

## 2025-06-09 NOTE — H&P ADULT - NSHPPHYSICALEXAM_GEN_ALL_CORE
VITALS:   Vital Signs Last 24 Hrs  T(C): 36.7 (09 Jun 2025 22:35), Max: 36.7 (09 Jun 2025 22:35)  T(F): 98.1 (09 Jun 2025 22:35), Max: 98.1 (09 Jun 2025 22:35)  HR: 76 (09 Jun 2025 22:35) (72 - 76)  BP: 158/91 (09 Jun 2025 22:35) (136/60 - 162/72)  BP(mean): --  RR: 18 (09 Jun 2025 22:35) (18 - 18)  SpO2: 98% (09 Jun 2025 22:35) (98% - 100%)    Parameters below as of 09 Jun 2025 22:35  Patient On (Oxygen Delivery Method): room air      I&O's Summary    CAPILLARY BLOOD GLUCOSE    Physical Exam:  General: NAD, non-toxic appearing   HEENT: PERRL, no scleral icterus  CV: RRR, normal S1 and S2  Lungs: normal respiratory effort on RA, CTAB  Abd: soft, nontender, nondistended  Ext: no edema, 2+ peripheral pulses   Psych: AAOx3, appropriate affect   Neuro: grossly non-focal, moving all extremities spontaneously   Skin: no rashes or lesions

## 2025-06-09 NOTE — H&P ADULT - NSICDXPASTMEDICALHX_GEN_ALL_CORE_FT
PAST MEDICAL HISTORY:  [place] (hard of hearing)     HTN (hypertension)     Hyperlipidemia

## 2025-06-09 NOTE — ED ADULT NURSE REASSESSMENT NOTE - NS ED NURSE REASSESS COMMENT FT1
Report received from day RN: pt resting comfortably in stretcher, breathing even and unlabored. Offers no complaints at this time. Instructed to call for assistance. Stretcher in lowest position, wheels locked, appropriate side rails in place, call bell in reach. Family at bedside. Pending admitted bed assignment

## 2025-06-09 NOTE — ED PROVIDER NOTE - ATTENDING CONTRIBUTION TO CARE
DR. BLOCH, ATTENDING MD-  I performed a face to face bedside interview with patient regarding history of present illness, review of symptoms and past medical history. I completed an independent physical exam.  I have discussed patient's plan of care with the resident.  Patient very [place], oriented x 2, perrl, cranial nerves intact, flat facies, neck supple, no tremor but has some rigidity no evidence of trauma, Denies hallucinations at this time. Hx from daughter.

## 2025-06-09 NOTE — ED PROVIDER NOTE - PHYSICAL EXAMINATION
Physical Exam:  Gen: no acute distress, AOx3, nontoxic appearing  Head: NCAT  HEENT: EOMI, PEERLA, normal conjunctiva, tongue midline, oral mucosa moist  Lung: CTAB, no respiratory distress, no wheezes/rhonchi/rales B/L, speaking in full sentences  CV: RRR, no murmurs, rubs or gallops  Abd: soft, NT, ND, no guarding, no rigidity, no rebound tenderness, no CVA tenderness  MSK: no visible deformities, ROM normal in UE/LE, no neck / back pain, calf tenderness  Neuro: No focal sensory or motor deficits in cranial nerves, upper and lower extremities  Skin: Warm, well perfused, no rash, no leg swelling

## 2025-06-09 NOTE — ED ADULT NURSE NOTE - OBJECTIVE STATEMENT
Pt received, brought in by daughter at bedside who states patient has been hallucinating for a few  months. Per daughter patient is aware that what he is seeing is not really there. Pt was recently evaluated in [place] ED, blood work, ct scan UA all negative according to daughter. Pt is confused at present. Alert to self and place, confused to situation. 20g Iv placed in R arm, labs drawn as ordered. Daughter at bedside. Safety maintained.

## 2025-06-09 NOTE — ED PROVIDER NOTE - PROGRESS NOTE DETAILS
Ethan Sánchez DO (PGY1): Received signout at 1900 pending urinalysis.  In brief, 84M, PMHx HTN, HLD, questionable history of dementia, presenting to ED for hallucinations.  Workup unremarkable.  Patient endorsed to medicine for admission.

## 2025-06-10 LAB
ANION GAP SERPL CALC-SCNC: 13 MMOL/L — SIGNIFICANT CHANGE UP (ref 7–14)
BUN SERPL-MCNC: 21 MG/DL — SIGNIFICANT CHANGE UP (ref 7–23)
CALCIUM SERPL-MCNC: 9.3 MG/DL — SIGNIFICANT CHANGE UP (ref 8.4–10.5)
CHLORIDE SERPL-SCNC: 106 MMOL/L — SIGNIFICANT CHANGE UP (ref 98–107)
CO2 SERPL-SCNC: 24 MMOL/L — SIGNIFICANT CHANGE UP (ref 22–31)
CREAT SERPL-MCNC: 0.96 MG/DL — SIGNIFICANT CHANGE UP (ref 0.5–1.3)
CULTURE RESULTS: SIGNIFICANT CHANGE UP
EGFR: 78 ML/MIN/1.73M2 — SIGNIFICANT CHANGE UP
EGFR: 78 ML/MIN/1.73M2 — SIGNIFICANT CHANGE UP
GLUCOSE SERPL-MCNC: 108 MG/DL — HIGH (ref 70–99)
HCT VFR BLD CALC: 43.9 % — SIGNIFICANT CHANGE UP (ref 39–50)
HGB BLD-MCNC: 14.6 G/DL — SIGNIFICANT CHANGE UP (ref 13–17)
MAGNESIUM SERPL-MCNC: 2 MG/DL — SIGNIFICANT CHANGE UP (ref 1.6–2.6)
MCHC RBC-ENTMCNC: 27 PG — SIGNIFICANT CHANGE UP (ref 27–34)
MCHC RBC-ENTMCNC: 33.3 G/DL — SIGNIFICANT CHANGE UP (ref 32–36)
MCV RBC AUTO: 81.1 FL — SIGNIFICANT CHANGE UP (ref 80–100)
NRBC # BLD AUTO: 0 K/UL — SIGNIFICANT CHANGE UP (ref 0–0)
NRBC # FLD: 0 K/UL — SIGNIFICANT CHANGE UP (ref 0–0)
NRBC BLD AUTO-RTO: 0 /100 WBCS — SIGNIFICANT CHANGE UP (ref 0–0)
PHOSPHATE SERPL-MCNC: 2.3 MG/DL — LOW (ref 2.5–4.5)
PLATELET # BLD AUTO: 171 K/UL — SIGNIFICANT CHANGE UP (ref 150–400)
POTASSIUM SERPL-MCNC: 3.5 MMOL/L — SIGNIFICANT CHANGE UP (ref 3.5–5.3)
POTASSIUM SERPL-SCNC: 3.5 MMOL/L — SIGNIFICANT CHANGE UP (ref 3.5–5.3)
RBC # BLD: 5.41 M/UL — SIGNIFICANT CHANGE UP (ref 4.2–5.8)
RBC # FLD: 13.3 % — SIGNIFICANT CHANGE UP (ref 10.3–14.5)
SODIUM SERPL-SCNC: 143 MMOL/L — SIGNIFICANT CHANGE UP (ref 135–145)
SPECIMEN SOURCE: SIGNIFICANT CHANGE UP
WBC # BLD: 6.89 K/UL — SIGNIFICANT CHANGE UP (ref 3.8–10.5)
WBC # FLD AUTO: 6.89 K/UL — SIGNIFICANT CHANGE UP (ref 3.8–10.5)

## 2025-06-10 RX ORDER — OLANZAPINE 10 MG/1
2.5 TABLET ORAL ONCE
Refills: 0 | Status: COMPLETED | OUTPATIENT
Start: 2025-06-10 | End: 2025-06-10

## 2025-06-10 RX ORDER — POTASSIUM PHOSPHATE, MONOBASIC POTASSIUM PHOSPHATE, DIBASIC INJECTION, 236; 224 MG/ML; MG/ML
15 SOLUTION, CONCENTRATE INTRAVENOUS ONCE
Refills: 0 | Status: COMPLETED | OUTPATIENT
Start: 2025-06-10 | End: 2025-06-10

## 2025-06-10 RX ADMIN — AMLODIPINE BESYLATE 5 MILLIGRAM(S): 10 TABLET ORAL at 05:17

## 2025-06-10 RX ADMIN — ENOXAPARIN SODIUM 40 MILLIGRAM(S): 100 INJECTION SUBCUTANEOUS at 05:17

## 2025-06-10 RX ADMIN — OLANZAPINE 2.5 MILLIGRAM(S): 10 TABLET ORAL at 17:36

## 2025-06-10 RX ADMIN — POTASSIUM PHOSPHATE, MONOBASIC POTASSIUM PHOSPHATE, DIBASIC INJECTION, 62.5 MILLIMOLE(S): 236; 224 SOLUTION, CONCENTRATE INTRAVENOUS at 19:19

## 2025-06-10 RX ADMIN — OLANZAPINE 2.5 MILLIGRAM(S): 10 TABLET ORAL at 15:19

## 2025-06-10 NOTE — CONSULT NOTE ADULT - ASSESSMENT
A/P  84M with history of HTN, HLD, and BCC of forehead s/p excision brought in by daughter for hallucinations.     #Visual hallucination.   -CT head with No adverse interval change April 2025  -CXR with No focal consolidation.  -without any signs or symptoms of localizing infections   -pending MRI brain  -neuro eval   -delirium precautions.  #HTN  -continue home amlodipine 5mg daily    #HLD (hyperlipidemia).   -cont statin     dvt ppx  A/P  84M with history of HTN, HLD, and BCC of forehead s/p excision brought in by daughter for hallucinations.     #Visual hallucination.   -CT head with No adverse interval change April 2025  -CXR with No focal consolidation.  -without any signs or symptoms of localizing infections   -pending MRI brain  -neuro eval   -delirium precautions.    #HTN  -continue home amlodipine 5mg daily    #HLD (hyperlipidemia).   -cont statin     dvt ppx

## 2025-06-10 NOTE — BH CONSULTATION LIAISON ASSESSMENT NOTE - NSICDXPASTMEDICALHX_GEN_ALL_CORE_FT
PAST MEDICAL HISTORY:  [place] (hard of hearing)     HTN (hypertension)     Hyperlipidemia
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
## 2025-06-10 NOTE — BH CONSULTATION LIAISON ASSESSMENT NOTE - HPI (INCLUDE ILLNESS QUALITY, SEVERITY, DURATION, TIMING, CONTEXT, MODIFYING FACTORS, ASSOCIATED SIGNS AND SYMPTOMS)
patient is an 81 yr old M w/ PMHx significant for HTN, HLD, and BCC of forehead s/p excision brought in by daughter from home for visual hallucinations. Patient lives alone, as per daughter is self care, cooks, cleans drives. Patient with no PPHx, no family psychiatric hx. No substance abuse. As per chart review as well as extensive talk with daughter" In December 2024, patient underwent excision of BCC of the forehead. Patient went home the same day as the operation though at home he began to express that he was seeing people from his life who are no longer alive including his mother and some friends from [facility]. Family was told that this is a reaction to sedation, anesthetics and paralytics used for his operation. The symptoms resolved after about 2 days. Ever since this episode, the patient has reported seeing similar hallucinations though he always has insight that these are not real. He visited the ED in April 2025 for these symptoms and was told that these symptoms were due to sleep deprivation as the patient had been getting poor sleep ever since the hallucinations started in December 2024. He started taking melatonin for sleep which helped temporarily though the hallucinations have returned." Furthermore, as per daughter, patient only sees people in certain rooms in the home, can be instructed to go to his bedroom, and he is un-bothered there. Has never been threatened by these VH, has never been aggressive or agitated until today. Does have prescription glasses and hasn't been to the eye doctor recently, today c/o double vision.     Patient was seen and assessed at bedside. Patient is lethagric after PRN zyprexa given.   Information obtained from daughter as above.

## 2025-06-10 NOTE — PROGRESS NOTE ADULT - ASSESSMENT
84M with history of HTN, HLD, and BCC of forehead s/p excision brought in by daughter for hallucinations without confusion, insight intact.       Problem/Plan - 1:  ·  Problem: Visual hallucination.   ·  Plan: No known h/o MCI/dementia, with longstanding "bad dreams" with 2 episodes of visual hallucinations this year, both without confusion and with insight intact, no auditory hallucinations. 1st time was in April in setting of recent surgery/anesthesia likely delirium. This time shortly after waking from dream and in setting of sleep deprivation, potentially delirium vs sign of MCI vs dementia (Lewy body vs Parkinson's)- confusion may be absent and insight preserved in early stages. Theo Bonnet syndrome also in ddx given visual impairment though is a diagnosis of exclusion   - CTH without acute findings   - neuro and psych fu     Problem/Plan - 2:  ·  Problem: HTN (hypertension).   ·  Plan: - continue home amlodipine 5mg daily.    Problem/Plan - 3:  ·  Problem: HLD (hyperlipidemia).   ·  Plan: - continue home simvastatin 20mg qhs.    Problem/Plan - 4:  ·  Problem: Prophylactic measure.   ·  Plan: Diet: DASH  DVT ppx: lovenox SQ    Dispo: pending clinical course.

## 2025-06-10 NOTE — CONSULT NOTE ADULT - ATTENDING COMMENTS
Mr. Feldman is a 85 yo man w/ PMHx significant for HTN, HLD, and BCC of forehead s/p excision brought in by daughter for visual hallucinations. Seems like this has been a recurrent problem since 2024, although patient mostly aware of his hallucinations. Patient lives with family who provide a lot of support which may be masking some insidious cognitive difficulties, however, patient at the very least independent of all iADLs and mostly independent in general. Currently patient is speaking incessantly in [place] and seems to have paranoid content. Reports of agitation at night and poor sleep. Suspect superimposed component of delirium on top of more chronic process. Parkinsonian features were hard to assess as patient is resisting confrontation motor exam. Could have some more rigidity on L side.        Recommendations:    [] Follow up reversible workup: check UA, Utox, ETOH level, TSH, T3/T4, RPR, vitamin B1, B6, B12, folate, homocysteine, methylmalonic acid, lactate, ammonia, Cu, SPEP, ESR, CRP, Zn  [] MRI brain w/wo  [] routine EEG  [] may need some antipsychotic at bedtime and prn for agitation such as seroquel 12.5 at bedtime, would defer to psych regarding these PRNs  [] Patient can follow up with general neurology at [address] [place] [address] 1-2 weeks after discharge. Please instruct the patient to call [phone] to schedule this appointment.

## 2025-06-10 NOTE — CONSULT NOTE ADULT - TIME BILLING
agree with above  84M with history of HTN, HLD, and BCC of forehead s/p excision brought in by daughter for hallucinations.     #Visual hallucination.   -CT head with No adverse interval change April 2025  -CXR with No focal consolidation.  -without any signs or symptoms of localizing infections   -pending MRI brain  -neuro eval   -delirium precautions.    #HTN  -continue home amlodipine 5mg daily    #HLD (hyperlipidemia).   -cont statin     dvt ppx

## 2025-06-10 NOTE — PATIENT PROFILE ADULT - FALL HARM RISK - HARM RISK INTERVENTIONS
Assistance with ambulation/Assistance OOB with selected safe patient handling equipment/Communicate Risk of Fall with Harm to all staff/Discuss with provider need for PT consult/Monitor gait and stability/Reinforce activity limits and safety measures with patient and family/Tailored Fall Risk Interventions/Visual Cue: Yellow wristband and red socks/Bed in lowest position, wheels locked, appropriate side rails in place/Call bell, personal items and telephone in reach/Instruct patient to call for assistance before getting out of bed or chair/Non-slip footwear when patient is out of bed/[place] to call system/Physically safe environment - no spills, clutter or unnecessary equipment/Purposeful Proactive Rounding/Room/bathroom lighting operational, light cord in reach

## 2025-06-10 NOTE — CHART NOTE - NSCHARTNOTEFT_GEN_A_CORE
Hospitalist Medicine NP     Call to patient's daughter to obtain consent for MRI with contrast study as well as to complete the screening forms. Risks vs benefit discussed, family opts to wait to speak with patient's primary care MD prior to giving consent for contrast study.   Neuro team consulted this AM with agreement to proceed with prior ordered MRI for further work up in lieu of official consult recs (will see patient today).     Will follow up on family decision regarding MRI study later today.     ELLIS Ching [facility]   Medicine [id number]

## 2025-06-10 NOTE — BH CONSULTATION LIAISON ASSESSMENT NOTE - NSBHATTESTAPPAMEND_PSY_A_CORE
I have personally seen and examined this patient. I fully participated in the care of this patient. I have made amendments to the documentation where appropriate and otherwise agree with the history, physical exam, and plan as documented by the FEMI

## 2025-06-10 NOTE — CHART NOTE - NSCHARTNOTEFT_GEN_A_CORE
Hospitalist Medicine NP     Called by RN to assess patient for c/o double vision, aggression and agitation.     Patient seen and examined, alert, confused, combative. Struck provider in chest when assessing pupillary status.   Neuro exam otherwise non-focal. As per patient's daughter, patient was asleep and woke up confused, combative, and trying to climb out of bed.     Vital Signs Last 24 Hrs  T(C): 36.6 (10 Hayder 2025 09:20), Max: 36.8 (10 Hayder 2025 05:16)  T(F): 97.8 (10 Hayder 2025 09:20), Max: 98.3 (10 Hayder 2025 05:16)  HR: 82 (10 Hayder 2025 09:20) (74 - 92)  BP: 138/62 (10 Hayder 2025 09:20) (125/55 - 158/91)  BP(mean): --  RR: 19 (10 Hayder 2025 09:20) (18 - 19)  SpO2: 95% (10 Hayder 2025 09:20) (95% - 99%)    Parameters below as of 10 Hayder 2025 09:20  Patient On (Oxygen Delivery Method): room air    T(C): 36.6 (06-10-25 @ 09:20), Max: 36.8 (06-10-25 @ 05:16)  HR: 82 (06-10-25 @ 09:20) (74 - 92)  BP: 138/62 (06-10-25 @ 09:20) (125/55 - 158/91)  RR: 19 (06-10-25 @ 09:20) (18 - 19)  SpO2: 95% (06-10-25 @ 09:20) (95% - 99%)      EYES: PERRL and symmetric, No conjunctival or scleral injection, non-icteric  RESP: No respiratory distress, no use of accessory muscles  CV: RRR, +S1S2  NEURO: Alert, confused, agitated, FULL ROM, normal muscle strength/tone  PSYCH: A+O x 1, confused, agitated    Plan: Zyprexa 2.5mg  IM x 1 dose now ordered. Call to Behavioral health for consultation for medication management recommendations. In agreement with Zyprexa as ordered. Discussed with Neuro resident Dr. Cherry hanson with Zyprexa dose.    D/w Dr. Reed, in agreement with plan of care.     Discussed with patient's daughter at bedside, all questions answered.     ELLIS Ching DNP  Medicine [phone] Hospitalist Medicine NP     Called by RN to assess patient for c/o double vision, aggression and agitation.     Patient seen and examined, alert, confused, combative. Struck provider in chest when assessing pupillary status.   Neuro exam otherwise non-focal. As per patient's daughter, patient was asleep and woke up confused, combative, and trying to climb out of bed.     Vital Signs Last 24 Hrs  T(C): 36.6 (10 Hayder 2025 09:20), Max: 36.8 (10 Hayder 2025 05:16)  T(F): 97.8 (10 Hayder 2025 09:20), Max: 98.3 (10 Hayder 2025 05:16)  HR: 82 (10 Hayder 2025 09:20) (74 - 92)  BP: 138/62 (10 Hayder 2025 09:20) (125/55 - 158/91)  BP(mean): --  RR: 19 (10 Hayder 2025 09:20) (18 - 19)  SpO2: 95% (10 Hayder 2025 09:20) (95% - 99%)    Parameters below as of 10 Hayder 2025 09:20  Patient On (Oxygen Delivery Method): room air    T(C): 36.6 (06-10-25 @ 09:20), Max: 36.8 (06-10-25 @ 05:16)  HR: 82 (06-10-25 @ 09:20) (74 - 92)  BP: 138/62 (06-10-25 @ 09:20) (125/55 - 158/91)  RR: 19 (06-10-25 @ 09:20) (18 - 19)  SpO2: 95% (06-10-25 @ 09:20) (95% - 99%)      EYES: PERRL and symmetric, No conjunctival or scleral injection, non-icteric  RESP: No respiratory distress, no use of accessory muscles  CV: RRR, +S1S2  NEURO: Alert, confused, agitated, FULL ROM, normal muscle strength/tone  PSYCH: A+O x 1, confused, agitated    Plan: Zyprexa 2.5mg  IM x 1 dose now ordered. Call to Behavioral health for consultation for medication management recommendations. Discussed case with Tennille Correa NP, in agreement with Zyprexa as ordered. Discussed with Neuro resident Dr. Carey, also okay with Zyprexa dose.    D/w Dr. Reed, in agreement with plan of care.     Discussed with patient's daughters via telephone call, reviewed plan of care, all questions answered.     ELLIS Ching DNP  Medicine [phone] Hospitalist Medicine NP     Called by RN to assess patient for c/o double vision as reported by patient's daughter at bedside, and new aggression and agitation.     Patient seen and examined, alert, confused, combative. Struck provider in chest when assessing pupillary status.   Neuro exam otherwise non-focal. As per patient's daughter, patient was asleep and woke up confused, combative, and trying to climb out of bed.     Vital Signs Last 24 Hrs  T(C): 36.6 (10 Hayder 2025 09:20), Max: 36.8 (10 Hayder 2025 05:16)  T(F): 97.8 (10 Hayder 2025 09:20), Max: 98.3 (10 Hayder 2025 05:16)  HR: 82 (10 Hayder 2025 09:20) (74 - 92)  BP: 138/62 (10 Hayder 2025 09:20) (125/55 - 158/91)  BP(mean): --  RR: 19 (10 Hayder 2025 09:20) (18 - 19)  SpO2: 95% (10 Hayder 2025 09:20) (95% - 99%)    Parameters below as of 10 Hayder 2025 09:20  Patient On (Oxygen Delivery Method): room air    T(C): 36.6 (06-10-25 @ 09:20), Max: 36.8 (06-10-25 @ 05:16)  HR: 82 (06-10-25 @ 09:20) (74 - 92)  BP: 138/62 (06-10-25 @ 09:20) (125/55 - 158/91)  RR: 19 (06-10-25 @ 09:20) (18 - 19)  SpO2: 95% (06-10-25 @ 09:20) (95% - 99%)      EYES: PERRL and symmetric, No conjunctival or scleral injection, non-icteric  RESP: No respiratory distress, no use of accessory muscles  CV: RRR, +S1S2  NEURO: Alert, confused, agitated, FULL ROM, normal muscle strength/tone  PSYCH: A+O x 1, confused, agitated    Plan: Zyprexa 2.5mg  IM x 1 dose now ordered. Call to Behavioral health for consultation for medication management recommendations. Discussed case with Tennille Correa NP, in agreement with Zyprexa as ordered. Discussed with Neuro resident Dr. Carey, also okay with Zyprexa dose.    D/w Dr. Reed, in agreement with plan of care.     Discussed with patient's daughters via telephone call, reviewed plan of care, all questions answered.     ELLIS Ching DNP  Medicine [phone]

## 2025-06-10 NOTE — BH CONSULTATION LIAISON ASSESSMENT NOTE - NSBHATTESTCOMMENTATTENDFT_PSY_A_CORE
agree with plan as above, patient more calm, minimal EPS on exam would need to return to safe dispo such as [facility]

## 2025-06-10 NOTE — BH CONSULTATION LIAISON ASSESSMENT NOTE - SUMMARY
patient is an 81 yr old M w/ PMHx significant for HTN, HLD, and BCC of forehead s/p excision brought in by daughter from home for visual hallucinations. Patient lives alone, as per daughter is self care, cooks, cleans drives. Patient with no PPHx, no family psychiatric hx. No substance abuse. As per chart review as well as extensive talk with daughter" In December 2024, patient underwent excision of BCC of the forehead. Patient went home the same day as the operation though at home he began to express that he was seeing people from his life who are no longer alive including his mother and some friends from [facility]. Family was told that this is a reaction to sedation, anesthetics and paralytics used for his operation. The symptoms resolved after about 2 days. Ever since this episode, the patient has reported seeing similar hallucinations though he always has insight that these are not real. He visited the ED in April 2025 for these symptoms and was told that these symptoms were due to sleep deprivation as the patient had been getting poor sleep ever since the hallucinations started in December 2024. He started taking melatonin for sleep which helped temporarily though the hallucinations have returned." Furthermore, as per daughter, patient only sees people in certain rooms in the home, can be instructed to go to his bedroom, and he is un-bothered there. Has never been threatened by these VH, has never been aggressive or agitated until today. Does have prescription glasses and hasn't been to the eye doctor recently, today c/o double vision.     PLAN  - patient on hello 2, no psychiatric need for CO  - followed by neuro , agree with medical work up for VH ( check UA, Utox, ETOH level, TSH, T3/T4, RPR, vitamin B1, B6, B12, folate, homocysteine, methylmalonic acid, lactate, ammonia, Cu, SPEP, ESR, CRP, Zn, MRI brain w/wo, EEG ?)  - will hold off on standing psychotropics at this time during medical work up  - PRN for agitation: Zyprexa 2.5mg if qtc < 500  -- please get EKG when patient calm
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
## 2025-06-10 NOTE — BH CONSULTATION LIAISON ASSESSMENT NOTE - NSBHCHARTREVIEWLAB_PSY_A_CORE FT
14.6   6.89  )-----------( 171      ( 10 Hayder 2025 06:00 )             43.9   06-10    143  |  106  |  21  ----------------------------<  108[H]  3.5   |  24  |  0.96    Ca    9.3      10 Hayder 2025 06:00  Phos  2.3     06-10  Mg     2.00     06-10    TPro  7.8  /  Alb  4.7  /  TBili  0.8  /  DBili  x   /  AST  32  /  ALT  34  /  AlkPhos  118  06-09

## 2025-06-10 NOTE — CONSULT NOTE ADULT - SUBJECTIVE AND OBJECTIVE BOX
CARDIOLOGY CONSULT - Dr. Gore     Patient Name: MILO CARDONA    Date of Service: 06-10-25 @ 09:53    Patient seen and examined    HPI:  84M with history of HTN, HLD, and BCC of forehead s/p excision brought in by daughter for hallucinations. Pt AAOx3 but hard of hearing, collateral obtained from daughter at bedside. Earlier this morning, pt called daughter saying he was seeing people in his room that he knows aren't there (friends and family from his earlier years back in [facility]). Believes pt had a dream about that last night and woke up seeing them. Pt had a similar experience of visual hallucinations back in April, for which he went to [facility] ED for, also with insight intact, but this was after he underwent excision of his BCC with anesthesia. Both this time and last, pt was sleep deprived. Pt without any known history of dementia. He lives alone independently with daughters 10 min away. Family have not noticed any signs of dementia aside from typical signs of aging, no forgetfulness or wandering. Pt does have a longstanding history of bad dreams. No significant family history of dementia or other neurological disorders. Does wear corrective glasses and feels that he's overdue to see eye doctor to have prescription updated. No new medications, drug or alcohol use.     ED Course:  Labs unrevealing. CTH without acute pathology. UA without UTI. CXR clear. Pt lives alone, concern for unsafe disposition, admitted for  neurologic evaluation and MRI.  (09 Jun 2025 21:20)    Patient denies any chest pain, dyspnea, passing out, dizziness, or palpitations.     PAST MEDICAL & SURGICAL HISTORY:  HTN (hypertension)      Hyperlipidemia      [place] (hard of hearing)      History of wisdom tooth extraction              PREVIOUS DIAGNOSTIC TESTING:    [ ] Echocardiogram:  [ ]  Catheterization:  [ ] Stress Test:  	    MEDICATIONS:  Home Medications:  Norvasc 5 mg oral tablet: 1 tab(s) orally once a day (09 Jun 2025 22:50)  simvastatin 20 mg oral tablet: 1 tab(s) orally once a day (at bedtime) (09 Jun 2025 22:50)      MEDICATIONS  (STANDING):  amLODIPine   Tablet 5 milliGRAM(s) Oral daily  atorvastatin 10 milliGRAM(s) Oral at bedtime  enoxaparin Injectable 40 milliGRAM(s) SubCutaneous every 24 hours      FAMILY HISTORY:  No pertinent family history in first degree relatives        SOCIAL HISTORY:    [x] Non-smoker  [ ] Smoker  [ ] Alcohol    Allergies    No Known Allergies    Intolerances    	    REVIEW OF SYSTEMS:  CONSTITUTIONAL: No fever, weight loss, or fatigue  EYES: No eye pain, visual disturbances, or discharge  ENMT:  No difficulty hearing, tinnitus, vertigo; No sinus or throat pain  NECK: No pain or stiffness  RESPIRATORY: No cough, wheezing, chills or hemoptysis; No Shortness of Breath  CARDIOVASCULAR: No chest pain, palpitations, passing out, dizziness, or leg swelling  GASTROINTESTINAL: No abdominal or epigastric pain. No nausea, vomiting, or hematemesis; No diarrhea or constipation. No melena or hematochezia.  GENITOURINARY: No dysuria, frequency, hematuria, or incontinence  NEUROLOGICAL: No headaches, memory loss, loss of strength, numbness, or tremors  SKIN: No itching, burning, rashes, or lesions   	    [x] All others negative	  [ ] Unable to obtain    PHYSICAL EXAM:  T(C): 36.8 (06-10-25 @ 05:16), Max: 36.8 (06-10-25 @ 05:16)  HR: 92 (06-10-25 @ 05:16) (72 - 92)  BP: 147/60 (06-10-25 @ 05:16) (125/55 - 162/72)  RR: 18 (06-10-25 @ 05:16) (18 - 18)  SpO2: 99% (06-10-25 @ 05:16) (98% - 100%)  Wt(kg): --  I&O's Summary    09 Jun 2025 07:01  -  10 Hayder 2025 07:00  --------------------------------------------------------  IN: 200 mL / OUT: 400 mL / NET: -200 mL        Appearance: Normal	  Psychiatry: A & O x 3, Mood & affect appropriate  HEENT:   Normal oral mucosa, PERRL, EOMI	  Lymphatic: No lymphadenopathy  Cardiovascular: Normal S1 S2,RRR, No JVD, No murmurs  Respiratory: Lungs clear to auscultation b/l  Gastrointestinal:  Soft, Non-tender, + BS	  Skin: No rashes, No ecchymoses, No cyanosis	  Neurologic: Non-focal  Extremities: Normal range of motion, No clubbing, cyanosis or edema  Vascular: Peripheral pulses palpable 2+ bilaterally    TELEMETRY: 	    ECG:  	no ECG in chart   RADIOLOGY:  < from: Xray Chest 1 View AP/PA (06.09.25 @ 17:33) >    IMPRESSION:  No focal consolidation.    < end of copied text >    < from: CT Head No Cont (06.09.25 @ 17:08) >  IMPRESSION:   No adverse interval change April 2025    < end of copied text >    OTHER: 	  	  LABS:	 	    CARDIAC MARKERS:                                  14.6   6.89  )-----------( 171      ( 10 Hayder 2025 06:00 )             43.9     06-10    143  |  106  |  21  ----------------------------<  108[H]  3.5   |  24  |  0.96    Ca    9.3      10 Hayder 2025 06:00  Phos  2.3     06-10  Mg     2.00     06-10    TPro  7.8  /  Alb  4.7  /  TBili  0.8  /  DBili  x   /  AST  32  /  ALT  34  /  AlkPhos  118  06-09      proBNP:   Lipid Profile:   HgA1c:   TSH: Thyroid Stimulating Hormone, Serum: 1.64 uIU/mL (06-09 @ 16:10)                Thyroid Stimulating Hormone, Serum: 1.64 uIU/mL (06-09 @ 16:10)     CARDIOLOGY CONSULT - Dr. Gore     Patient Name: MILO CARDONA    Date of Service: 06-10-25 @ 09:53    Patient seen and examined    HPI:  84M with history of HTN, HLD, and BCC of forehead s/p excision brought in by daughter for hallucinations. Pt AAOx3 but hard of hearing, collateral obtained from daughter at bedside. Earlier this morning, pt called daughter saying he was seeing people in his room that he knows aren't there (friends and family from his earlier years back in [facility]). Believes pt had a dream about that last night and woke up seeing them. Pt had a similar experience of visual hallucinations back in April, for which he went to \[facility]\"" ED for, also with insight intact, but this was after he underwent excision of his BCC with anesthesia. Both this time and last, pt was sleep deprived. Pt without any known history of dementia. He lives alone independently with daughters 10 min away. Family have not noticed any signs of dementia aside from typical signs of aging, no forgetfulness or wandering. Pt does have a longstanding history of bad dreams. No significant family history of dementia or other neurological disorders. Does wear corrective glasses and feels that he's overdue to see eye doctor to have prescription updated. No new medications, drug or alcohol use.     ED Course:  Labs unrevealing. CTH without acute pathology. UA without UTI. CXR clear. Pt lives alone, concern for unsafe disposition, admitted for  neurologic evaluation and MRI.  (09 Jun 2025 21:20)    Patient denies any chest pain, dyspnea, passing out, dizziness, or palpitations.     PAST MEDICAL & SURGICAL HISTORY:  HTN (hypertension)      Hyperlipidemia      [place] (hard of hearing)      History of wisdom tooth extraction              PREVIOUS DIAGNOSTIC TESTING:    [ ] Echocardiogram:  [ ]  Catheterization:  [ ] Stress Test:  	    MEDICATIONS:  Home Medications:  Norvasc 5 mg oral tablet: 1 tab(s) orally once a day (09 Jun 2025 22:50)  simvastatin 20 mg oral tablet: 1 tab(s) orally once a day (at bedtime) (09 Jun 2025 22:50)      MEDICATIONS  (STANDING):  amLODIPine   Tablet 5 milliGRAM(s) Oral daily  atorvastatin 10 milliGRAM(s) Oral at bedtime  enoxaparin Injectable 40 milliGRAM(s) SubCutaneous every 24 hours      FAMILY HISTORY:  No pertinent family history in first degree relatives        SOCIAL HISTORY:    [x] Non-smoker  [ ] Smoker  [ ] Alcohol    Allergies    No Known Allergies    Intolerances    	    REVIEW OF SYSTEMS:  CONSTITUTIONAL: No fever, weight loss, or fatigue  EYES: No eye pain, visual disturbances, or discharge  ENMT:  No difficulty hearing, tinnitus, vertigo; No sinus or throat pain  NECK: No pain or stiffness  RESPIRATORY: No cough, wheezing, chills or hemoptysis; No Shortness of Breath  CARDIOVASCULAR: No chest pain, palpitations, passing out, dizziness, or leg swelling  GASTROINTESTINAL: No abdominal or epigastric pain. No nausea, vomiting, or hematemesis; No diarrhea or constipation. No melena or hematochezia.  GENITOURINARY: No dysuria, frequency, hematuria, or incontinence  NEUROLOGICAL: No headaches, memory loss, loss of strength, numbness, or tremors; +hallucinations   SKIN: No itching, burning, rashes, or lesions   	    [x] All others negative	  [ ] Unable to obtain    PHYSICAL EXAM:  T(C): 36.8 (06-10-25 @ 05:16), Max: 36.8 (06-10-25 @ 05:16)  HR: 92 (06-10-25 @ 05:16) (72 - 92)  BP: 147/60 (06-10-25 @ 05:16) (125/55 - 162/72)  RR: 18 (06-10-25 @ 05:16) (18 - 18)  SpO2: 99% (06-10-25 @ 05:16) (98% - 100%)  Wt(kg): --  I&O's Summary    09 Jun 2025 07:01  -  10 Hayder 2025 07:00  --------------------------------------------------------  IN: 200 mL / OUT: 400 mL / NET: -200 mL        Appearance: Normal	  Psychiatry: A & O x 3, Mood & affect appropriate  HEENT:   Normal oral mucosa, PERRL, EOMI	  Lymphatic: No lymphadenopathy  Cardiovascular: Normal S1 S2,RRR, No JVD, No murmurs  Respiratory: Lungs clear to auscultation b/l  Gastrointestinal:  Soft, Non-tender, + BS	  Skin: No rashes, No ecchymoses, No cyanosis	  Neurologic: Non-focal  Extremities: Normal range of motion, No clubbing, cyanosis or edema  Vascular: Peripheral pulses palpable 2+ bilaterally    TELEMETRY: 	    ECG:  	no ECG in chart   RADIOLOGY:  < from: Xray Chest 1 View AP/PA (06.09.25 @ 17:33) >    IMPRESSION:  No focal consolidation.    < end of copied text >    < from: CT Head No Cont (06.09.25 @ 17:08) >  IMPRESSION:   No adverse interval change April 2025    < end of copied text >    OTHER: 	  	  LABS:	 	    CARDIAC MARKERS:                                  14.6   6.89  )-----------( 171      ( 10 Hayder 2025 06:00 )             43.9     06-10    143  |  106  |  21  ----------------------------<  108[H]  3.5   |  24  |  0.96    Ca    9.3      10 Hayder 2025 06:00  Phos  2.3     06-10  Mg     2.00     06-10    TPro  7.8  /  Alb  4.7  /  TBili  0.8  /  DBili  x   /  AST  32  /  ALT  34  /  AlkPhos  118  06-09      proBNP:   Lipid Profile:   HgA1c:   TSH: Thyroid Stimulating Hormone, Serum: 1.64 uIU/mL (06-09 @ 16:10)                Thyroid Stimulating Hormone, Serum: 1.64 uIU/mL (06-09 @ 16:10)

## 2025-06-10 NOTE — BH CONSULTATION LIAISON ASSESSMENT NOTE - CURRENT MEDICATION
MEDICATIONS  (STANDING):  amLODIPine   Tablet 5 milliGRAM(s) Oral daily  atorvastatin 10 milliGRAM(s) Oral at bedtime  chlorhexidine 2% Cloths 1 Application(s) Topical <User Schedule>  enoxaparin Injectable 40 milliGRAM(s) SubCutaneous every 24 hours  potassium phosphate IVPB 15 milliMole(s) IV Intermittent once    MEDICATIONS  (PRN):  acetaminophen     Tablet .. 650 milliGRAM(s) Oral every 6 hours PRN Temp greater or equal to 38C (100.4F), Mild Pain (1 - 3)  aluminum hydroxide/magnesium hydroxide/simethicone Suspension 30 milliLiter(s) Oral every 4 hours PRN Dyspepsia  melatonin 3 milliGRAM(s) Oral at bedtime PRN Insomnia  ondansetron Injectable 4 milliGRAM(s) IV Push every 8 hours PRN Nausea and/or Vomiting

## 2025-06-10 NOTE — BH CONSULTATION LIAISON ASSESSMENT NOTE - NSBHCHARTREVIEWVS_PSY_A_CORE FT
Vital Signs Last 24 Hrs  T(C): 36.9 (10 Hayder 2025 17:26), Max: 36.9 (10 Hayder 2025 17:26)  T(F): 98.4 (10 Hayder 2025 17:26), Max: 98.4 (10 Hayder 2025 17:26)  HR: 85 (10 Hayder 2025 17:26) (74 - 92)  BP: 160/72 (10 Hayder 2025 17:26) (125/55 - 160/72)  BP(mean): --  RR: 20 (10 Hayder 2025 17:26) (18 - 20)  SpO2: 100% (10 Hayder 2025 17:26) (95% - 100%)    Parameters below as of 10 Hayder 2025 17:26  Patient On (Oxygen Delivery Method): room air

## 2025-06-11 LAB
MRSA PCR RESULT.: SIGNIFICANT CHANGE UP
S AUREUS DNA NOSE QL NAA+PROBE: SIGNIFICANT CHANGE UP

## 2025-06-11 PROCEDURE — 99232 SBSQ HOSP IP/OBS MODERATE 35: CPT

## 2025-06-11 PROCEDURE — 99223 1ST HOSP IP/OBS HIGH 75: CPT

## 2025-06-11 RX ORDER — OLANZAPINE 10 MG/1
2.5 TABLET ORAL ONCE
Refills: 0 | Status: COMPLETED | OUTPATIENT
Start: 2025-06-11 | End: 2025-06-12

## 2025-06-11 RX ADMIN — ATORVASTATIN CALCIUM 10 MILLIGRAM(S): 80 TABLET, FILM COATED ORAL at 21:43

## 2025-06-11 NOTE — PROGRESS NOTE ADULT - ASSESSMENT
A/P  84M with history of HTN, HLD, and BCC of forehead s/p excision brought in by daughter for hallucinations.     #Visual hallucination.   -CT head with No adverse interval change April 2025  -CXR with No focal consolidation.  -without any signs or symptoms of localizing infections   -pending MRI brain  -neuro eval   -delirium precautions.    #HTN  -continue home amlodipine 5mg daily    #HLD (hyperlipidemia).   -cont statin     dvt ppx     35 minutes spent on total encounter; more than 50% of the visit was spent counseling and/or coordinating care by the attending physician.

## 2025-06-11 NOTE — CONSULT NOTE ADULT - SUBJECTIVE AND OBJECTIVE BOX
Neurology Consult    Reason for Consult: Patient is a 84y old  Male who presents with a chief complaint of hallucinations, unsafe disposition (10 Hayder 2025 17:57)      HPI:  84M with history of HTN, HLD, and BCC of forehead s/p excision brought in by daughter for hallucinations. Pt AAOx3 but hard of hearing, collateral obtained from daughter at bedside. Earlier this morning, pt called daughter saying he was seeing people in his room that he knows aren't there (friends and family from his earlier years back in [place]). Believes pt had a dream about that last night and woke up seeing them. Pt had a similar experience of visual hallucinations back in April, for which he went to [facility] ED for, also with insight intact, but this was after he underwent excision of his BCC with anesthesia. Both this time and last, pt was sleep deprived. Pt without any known history of dementia. He lives alone independently with daughters 10 min away. Family have not noticed any signs of dementia aside from typical signs of aging, no forgetfulness or wandering. Pt does have a longstanding history of bad dreams. No significant family history of dementia or other neurological disorders. Does wear corrective glasses and feels that he's overdue to see eye doctor to have prescription updated. No new medications, drug or alcohol use.     ED Course:  Labs unrevealing. CTH without acute pathology. UA without UTI. CXR clear. Pt lives alone, concern for unsafe disposition, admitted for  neurologic evaluation and MRI.  (09 Jun 2025 21:20)       PAST MEDICAL & SURGICAL HISTORY:  HTN (hypertension)      Hyperlipidemia      [place] (hard of hearing)      History of wisdom tooth extraction          Allergies: Allergies    No Known Allergies    Intolerances        Social History: Denies toxic habits including tobacco, ETOH or illicit drugs.    Family History: FAMILY HISTORY:  No pertinent family history in first degree relatives    . No family history of strokes    Medications: MEDICATIONS  (STANDING):  amLODIPine   Tablet 5 milliGRAM(s) Oral daily  atorvastatin 10 milliGRAM(s) Oral at bedtime  chlorhexidine 2% Cloths 1 Application(s) Topical <User Schedule>  enoxaparin Injectable 40 milliGRAM(s) SubCutaneous every 24 hours    MEDICATIONS  (PRN):  acetaminophen     Tablet .. 650 milliGRAM(s) Oral every 6 hours PRN Temp greater or equal to 38C (100.4F), Mild Pain (1 - 3)  aluminum hydroxide/magnesium hydroxide/simethicone Suspension 30 milliLiter(s) Oral every 4 hours PRN Dyspepsia  melatonin 3 milliGRAM(s) Oral at bedtime PRN Insomnia  ondansetron Injectable 4 milliGRAM(s) IV Push every 8 hours PRN Nausea and/or Vomiting      Review of Systems:  CONSTITUTIONAL:  No weight loss, fever, chills, weakness or fatigue.  HEENT:  Eyes:  No visual loss, blurred vision, double vision or yellow sclera. Ears, Nose, Throat:  No hearing loss, sneezing, congestion, runny nose or sore throat.  SKIN:  No rash or itching.  CARDIOVASCULAR:  No chest pain, chest pressure or chest discomfort. No palpitations or edema.  RESPIRATORY:  No shortness of breath, cough or sputum.  GASTROINTESTINAL:  No anorexia, nausea, vomiting or diarrhea. No abdominal pain or blood.  GENITOURINARY:  No burning on urination or incontinence   NEUROLOGICAL:  No headache, dizziness, syncope, paralysis, ataxia, numbness or tingling in the extremities. No change in bowel or bladder control. no limb weakness. no vision changes.   MUSCULOSKELETAL:  No muscle, back pain, joint pain or stiffness.  HEMATOLOGIC:  No anemia, bleeding or bruising.  LYMPHATICS:  No enlarged nodes. No history of splenectomy.  PSYCHIATRIC:  No history of depression or anxiety.  ENDOCRINOLOGIC:  No reports of sweating, cold or heat intolerance. No polyuria or polydipsia.      Vitals:  Vital Signs Last 24 Hrs  T(C): 36.8 (11 Jun 2025 05:57), Max: 36.9 (10 Hayder 2025 17:26)  T(F): 98.2 (11 Jun 2025 05:57), Max: 98.4 (10 Hayder 2025 17:26)  HR: 85 (11 Jun 2025 05:57) (81 - 89)  BP: 163/72 (11 Jun 2025 05:57) (144/73 - 164/73)  BP(mean): --  RR: 20 (11 Jun 2025 05:57) (19 - 20)  SpO2: 99% (11 Jun 2025 05:57) (98% - 100%)    Parameters below as of 11 Jun 2025 05:57  Patient On (Oxygen Delivery Method): room air        General Exam:   General Appearance: Appropriately dressed and in no acute distress       Head: Normocephalic, atraumatic and no dysmorphic features  Ear, Nose, and Throat: Moist mucous membranes  CVS: S1S2+  Resp: No SOB, no wheeze or rhonchi  GI: soft NT/ND  Extremities: No edema or cyanosis  Skin: No bruises or rashes     Neurological Exam:  Mental Status: Awake, alert and oriented x 3.  Able to follow simple and complex verbal commands. Able to name and repeat. fluent speech. No obvious aphasia or dysarthria noted.   Cranial Nerves: PERRL, EOMI, VFFC, sensation V1-V3 intact,  no obvious facial asymmetry, equal elevation of palate, scm/trap 5/5, tongue is midline on protrusion. hearing is grossly intact.   Motor: Normal bulk, tone and strength throughout. Fine finger movements were intact and symmetric. no tremors or drift noted.    Sensation: Intact to light touch and pinprick throughout. no right/left confusion. no extinction to tactile on DSS.   Reflexes: 1+ throughout at biceps, brachioradialis, triceps, patellars and ankles bilaterally and equal. No clonus. R toe and L toe were both downgoing.  Coordination: No dysmetria on FNF or HKS  Gait: deferred    Data/Labs/Imaging which I personally reviewed.     Labs:     CBC Full  -  ( 10 Hayder 2025 06:00 )  WBC Count : 6.89 K/uL  RBC Count : 5.41 M/uL  Hemoglobin : 14.6 g/dL  Hematocrit : 43.9 %  Platelet Count - Automated : 171 K/uL  Mean Cell Volume : 81.1 fL  Mean Cell Hemoglobin : 27.0 pg  Mean Cell Hemoglobin Concentration : 33.3 g/dL  Auto Neutrophil # : x  Auto Lymphocyte # : x  Auto Monocyte # : x  Auto Eosinophil # : x  Auto Basophil # : x  Auto Neutrophil % : x  Auto Lymphocyte % : x  Auto Monocyte % : x  Auto Eosinophil % : x  Auto Basophil % : x    06-10    143  |  106  |  21  ----------------------------<  108[H]  3.5   |  24  |  0.96    Ca    9.3      10 Hayder 2025 06:00  Phos  2.3     06-10  Mg     2.00     06-10    TPro  7.8  /  Alb  4.7  /  TBili  0.8  /  DBili  x   /  AST  32  /  ALT  34  /  AlkPhos  118  06-09    LIVER FUNCTIONS - ( 09 Jun 2025 16:10 )  Alb: 4.7 g/dL / Pro: 7.8 g/dL / ALK PHOS: 118 U/L / ALT: 34 U/L / AST: 32 U/L / GGT: x             Urinalysis Basic - ( 10 Hayder 2025 06:00 )    Color: x / Appearance: x / SG: x / pH: x  Gluc: 108 mg/dL / Ketone: x  / Bili: x / Urobili: x   Blood: x / Protein: x / Nitrite: x   Leuk Esterase: x / RBC: x / WBC x   Sq Epi: x / Non Sq Epi: x / Bacteria: x          < from: CT Head No Cont (06.09.25 @ 17:08) >    ACC: [id number] EXAM:  CT BRAIN   ORDERED BY: RAHEEL REGALADO     PROCEDURE DATE:  06/09/2025          INTERPRETATION:  CT head without IV contrast    CLINICAL INFORMATION:     derlirium, hallcuinations  LCT    TECHNIQUE:  Contiguous axial 3 mm thick images were acquired.  This data   set was reconstructed in the sagittal and coronal planes.  Contrast was   not administered for this examination.  CONTRAST:    None  DOSE INFORMATION:   This scan was performed using automatic exposure   control (radiation dose reduction software) to obtain a diagnostic image   quality scan with patient dose as low as reasonably achievable.   Total   DLP for this examination is estimated at 901 mGy*cm.    COMPARISON:   CT head 4/18/2025    FINDINGS:    BRAIN:    The brain  demonstrates mild patchy indistinct diminished   attenuation within the deep cerebral hemispheric white matter to suggest   ischemic white matter disease. This may be most evident in the periatrial   and periventricular white matter. No cerebral cortical lesion is   recognized.   Cerebral cortical gray-white matter differentiation is   maintained.  No acute cerebral cortical infarct is seen.  No intracranial   hemorrhage is found.  No mass effect is found in the brain.    CSF SPACES:  The ventricles, sulci and basal cisterns appear moderately   dilated reflecting diffuse brain volume loss. Differential cerebellar   atrophy may be present.    HEAD AND NECK STRUCTURES:  The orbits are unremarkable.  The included   paranasal sinuses  are clear.  The nasal cavity appears intact.  The   nasopharynx is symmetric.  The central skull base is intact.  The   temporal bones demonstrate patent petrous air cells.  The calvarium   appears unremarkable.      IMPRESSION:   No adverse interval change April 2025    --- End of Report ---          MIGUEL WHARTON MD; Resident Radiologist  This document has been electronically signed.  REJI MORRIS MD; Attending Radiologist  This document has been electronically signed. Jun 9 2025  6:04PM    < end of copied text >

## 2025-06-11 NOTE — PROGRESS NOTE ADULT - ASSESSMENT
84M with history of HTN, HLD, and BCC of forehead s/p excision brought in by daughter for hallucinations without confusion, insight intact.       Problem/Plan - 1:  ·  Problem: Visual hallucination.   ·  Plan: No known h/o MCI/dementia, with longstanding "bad dreams" with 2 episodes of visual hallucinations this year, both without confusion and with insight intact, no auditory hallucinations. 1st time was in April in setting of recent surgery/anesthesia likely delirium. This time shortly after waking from dream and in setting of sleep deprivation, potentially delirium vs sign of MCI vs dementia (Lewy body vs Parkinson's)- confusion may be absent and insight preserved in early stages. Theo Bonnet syndrome also in ddx given visual impairment though is a diagnosis of exclusion   - CTH without acute findings   - neuro and psych fu     Problem/Plan - 2:  ·  Problem: HTN (hypertension).   ·  Plan: - continue home amlodipine 5mg daily.    Problem/Plan - 3:  ·  Problem: HLD (hyperlipidemia).   ·  Plan: - continue home simvastatin 20mg qhs.    Problem/Plan - 4:  ·  Problem: Prophylactic measure.   ·  Plan: Diet: DASH  DVT ppx: lovenox SQ       84M with history of HTN, HLD, and BCC of forehead s/p excision brought in by daughter for hallucinations without confusion, insight intact.       Problem/Plan - 1:  ·  Problem: Visual hallucination.   ·  Plan: No known h/o MCI/dementia, with longstanding "bad dreams" with 2 episodes of visual hallucinations this year, both without confusion and with insight intact, no auditory hallucinations. 1st time was in April in setting of recent surgery/anesthesia likely delirium. This time shortly after waking from dream and in setting of sleep deprivation, potentially delirium vs sign of MCI vs dementia (Lewy body vs Parkinson's)- confusion may be absent and insight preserved in early stages. Theo Bonnet syndrome also in ddx given visual impairment though is a diagnosis of exclusion   - CTH without acute findings   - neuro and psych fu     Problem/Plan - 2:  ·  Problem: HTN (hypertension).   ·  Plan: - continue home amlodipine 5mg daily.    Problem/Plan - 3:  ·  Problem: HLD (hyperlipidemia).     ·  Plan: - continue home simvastatin 20mg qhs.    Problem/Plan - 4:  ·  Problem: Prophylactic measure.   ·  Plan: Diet: DASH  DVT ppx: lovenox SQ

## 2025-06-11 NOTE — PROVIDER CONTACT NOTE (MEDICATION) - SITUATION
Patient is attempting to bite and scratch RN as she tried to give morning medications - unable to give any injection and PO medications

## 2025-06-11 NOTE — BH CONSULTATION LIAISON PROGRESS NOTE - NSBHATTESTBILLBASEDTIME_PSY_A_CORE
A (Catheter 8fr Xb3.5 Crv 100cm [place]is [place] Brtp Xl) guide catheter was introduced.    Time based billing

## 2025-06-11 NOTE — PROVIDER CONTACT NOTE (MEDICATION) - BACKGROUND
Patient admitted for hallucinations and during morning med pass patient kept pushing RN hands away and attempting to bite and squeeze hands of staff.Unable to give any injections or get patient to take PO pills

## 2025-06-12 LAB
AMPHET UR-MCNC: NEGATIVE — SIGNIFICANT CHANGE UP
ANION GAP SERPL CALC-SCNC: 14 MMOL/L — SIGNIFICANT CHANGE UP (ref 7–14)
BARBITURATES UR SCN-MCNC: NEGATIVE — SIGNIFICANT CHANGE UP
BENZODIAZ UR-MCNC: NEGATIVE — SIGNIFICANT CHANGE UP
BUN SERPL-MCNC: 17 MG/DL — SIGNIFICANT CHANGE UP (ref 7–23)
CALCIUM SERPL-MCNC: 9.4 MG/DL — SIGNIFICANT CHANGE UP (ref 8.4–10.5)
CHLORIDE SERPL-SCNC: 105 MMOL/L — SIGNIFICANT CHANGE UP (ref 98–107)
CO2 SERPL-SCNC: 23 MMOL/L — SIGNIFICANT CHANGE UP (ref 22–31)
COCAINE METAB.OTHER UR-MCNC: NEGATIVE — SIGNIFICANT CHANGE UP
CREAT SERPL-MCNC: 0.91 MG/DL — SIGNIFICANT CHANGE UP (ref 0.5–1.3)
CREATININE URINE RESULT, DAU: 230 MG/DL — SIGNIFICANT CHANGE UP
CRP SERPL-MCNC: 12.3 MG/L — HIGH
EGFR: 83 ML/MIN/1.73M2 — SIGNIFICANT CHANGE UP
EGFR: 83 ML/MIN/1.73M2 — SIGNIFICANT CHANGE UP
ERYTHROCYTE [SEDIMENTATION RATE] IN BLOOD: 2 MM/HR — SIGNIFICANT CHANGE UP (ref 0–15)
FENTANYL UR QL SCN: NEGATIVE — SIGNIFICANT CHANGE UP
FOLATE SERPL-MCNC: 16.3 NG/ML — SIGNIFICANT CHANGE UP (ref 3.1–17.5)
GLUCOSE SERPL-MCNC: 88 MG/DL — SIGNIFICANT CHANGE UP (ref 70–99)
HCT VFR BLD CALC: 48.5 % — SIGNIFICANT CHANGE UP (ref 39–50)
HCYS SERPL-MCNC: 13.3 UMOL/L — SIGNIFICANT CHANGE UP
HGB BLD-MCNC: 16.3 G/DL — SIGNIFICANT CHANGE UP (ref 13–17)
LACTATE SERPL-SCNC: 1 MMOL/L — SIGNIFICANT CHANGE UP (ref 0.5–2)
MAGNESIUM SERPL-MCNC: 2.2 MG/DL — SIGNIFICANT CHANGE UP (ref 1.6–2.6)
MCHC RBC-ENTMCNC: 27.3 PG — SIGNIFICANT CHANGE UP (ref 27–34)
MCHC RBC-ENTMCNC: 33.6 G/DL — SIGNIFICANT CHANGE UP (ref 32–36)
MCV RBC AUTO: 81.1 FL — SIGNIFICANT CHANGE UP (ref 80–100)
METHADONE UR-MCNC: NEGATIVE — SIGNIFICANT CHANGE UP
NRBC # BLD AUTO: 0 K/UL — SIGNIFICANT CHANGE UP (ref 0–0)
NRBC # FLD: 0 K/UL — SIGNIFICANT CHANGE UP (ref 0–0)
NRBC BLD AUTO-RTO: 0 /100 WBCS — SIGNIFICANT CHANGE UP (ref 0–0)
OPIATES UR-MCNC: NEGATIVE — SIGNIFICANT CHANGE UP
OXYCODONE UR-MCNC: NEGATIVE — SIGNIFICANT CHANGE UP
PCP SPEC-MCNC: SIGNIFICANT CHANGE UP
PCP UR-MCNC: NEGATIVE — SIGNIFICANT CHANGE UP
PHOSPHATE SERPL-MCNC: 3.1 MG/DL — SIGNIFICANT CHANGE UP (ref 2.5–4.5)
PLATELET # BLD AUTO: 117 K/UL — LOW (ref 150–400)
POTASSIUM SERPL-MCNC: 3.7 MMOL/L — SIGNIFICANT CHANGE UP (ref 3.5–5.3)
POTASSIUM SERPL-SCNC: 3.7 MMOL/L — SIGNIFICANT CHANGE UP (ref 3.5–5.3)
PROT SERPL-MCNC: 6.8 G/DL — SIGNIFICANT CHANGE UP (ref 6–8.3)
RBC # BLD: 5.98 M/UL — HIGH (ref 4.2–5.8)
RBC # FLD: 13.3 % — SIGNIFICANT CHANGE UP (ref 10.3–14.5)
SODIUM SERPL-SCNC: 142 MMOL/L — SIGNIFICANT CHANGE UP (ref 135–145)
THC UR QL: NEGATIVE — SIGNIFICANT CHANGE UP
VIT B12 SERPL-MCNC: 561 PG/ML — SIGNIFICANT CHANGE UP (ref 200–900)
WBC # BLD: 7.18 K/UL — SIGNIFICANT CHANGE UP (ref 3.8–10.5)
WBC # FLD AUTO: 7.18 K/UL — SIGNIFICANT CHANGE UP (ref 3.8–10.5)

## 2025-06-12 PROCEDURE — 84165 PROTEIN E-PHORESIS SERUM: CPT | Mod: 26

## 2025-06-12 PROCEDURE — 93010 ELECTROCARDIOGRAM REPORT: CPT

## 2025-06-12 PROCEDURE — 70553 MRI BRAIN STEM W/O & W/DYE: CPT | Mod: 26

## 2025-06-12 PROCEDURE — 99233 SBSQ HOSP IP/OBS HIGH 50: CPT

## 2025-06-12 PROCEDURE — 99232 SBSQ HOSP IP/OBS MODERATE 35: CPT

## 2025-06-12 RX ORDER — MELATONIN 5 MG
3 TABLET ORAL AT BEDTIME
Refills: 0 | Status: DISCONTINUED | OUTPATIENT
Start: 2025-06-12 | End: 2025-06-25

## 2025-06-12 RX ORDER — OLANZAPINE 10 MG/1
2.5 TABLET ORAL EVERY 6 HOURS
Refills: 0 | Status: DISCONTINUED | OUTPATIENT
Start: 2025-06-12 | End: 2025-06-25

## 2025-06-12 RX ORDER — OLANZAPINE 10 MG/1
1.25 TABLET ORAL AT BEDTIME
Refills: 0 | Status: DISCONTINUED | OUTPATIENT
Start: 2025-06-12 | End: 2025-06-13

## 2025-06-12 RX ADMIN — Medication 3 MILLIGRAM(S): at 01:27

## 2025-06-12 RX ADMIN — Medication 1 APPLICATION(S): at 06:43

## 2025-06-12 RX ADMIN — OLANZAPINE 1.25 MILLIGRAM(S): 10 TABLET ORAL at 22:09

## 2025-06-12 RX ADMIN — AMLODIPINE BESYLATE 5 MILLIGRAM(S): 10 TABLET ORAL at 06:43

## 2025-06-12 RX ADMIN — OLANZAPINE 2.5 MILLIGRAM(S): 10 TABLET ORAL at 14:33

## 2025-06-12 RX ADMIN — ATORVASTATIN CALCIUM 10 MILLIGRAM(S): 80 TABLET, FILM COATED ORAL at 22:09

## 2025-06-12 RX ADMIN — ENOXAPARIN SODIUM 40 MILLIGRAM(S): 100 INJECTION SUBCUTANEOUS at 06:43

## 2025-06-12 RX ADMIN — Medication 3 MILLIGRAM(S): at 22:10

## 2025-06-12 NOTE — PROVIDER CONTACT NOTE (OTHER) - SITUATION
Pts dtrs by bedside report to RN that pt states seeing double vision and is "not making any sense".
Pts dtrs by bedside and reported to RN that pt is not acting his normal self, confused, and is becoming aggressive.
Pts dtrs by bedside report to RN that pt states seeing double vision.

## 2025-06-12 NOTE — PROVIDER CONTACT NOTE (OTHER) - ASSESSMENT
Pts dtrs by bedside and reported to RN that pt is not acting his normal self, confused, and is becoming aggressive. RN assessed pt to which pt presented confused and no longer communicating back in English, only in Nauruan. Pts dtr states pt is not completely making sense. Pt perceived RN as potential threat and grabbed RNs hands, squeezing them. Pt presents aggression.
Pts dtrs by bedside report to RN that pt states seeing double vision and is "not making any sense". RN did a thorough neuro and strength assessment. Pt remains confused AOx1, no neuro deficits present. Pt presents no s/s of acute distress.
Pts dtrs by bedside report to RN that pt states that pt is seeing double vision. RN attempted a thorough neuro and strength assessment, yet pt was uncooperative. Pupils present PERRLA. Pt refuses to smile, lift boths arms and legs and refuses strength assessment. Pt remains confused AOx1, no s/s of acute distress.

## 2025-06-12 NOTE — PROGRESS NOTE ADULT - ASSESSMENT
Mr. Feldman is a 85 yo man w/ PMHx significant for HTN, HLD, and BCC of forehead s/p excision brought in by daughter for intermittent visual hallucinations over the last several months. Seems like this has been a recurrent problem since 2024, although patient mostly aware of his hallucinations. Patient lives with family who provide a lot of support which may be masking some insidious cognitive difficulties, however, patient at the very least independent of all iADLs and mostly independent in general. Today noted with some improvement, he is more calm and answering simple questions appropriately, though on my exam still somnolent, inattentive, and at times mumbling. Not sure if VH are still ongoing. MRI performed which did not reveal and signs of acute infarcts or inflammatory changes. Overall, there is some regional prominent sulci in temporoparietal regions which may indicate neurodegenerative process. Given spontaneous improvement today, seems to re-inforce idea that patient had some component of delirium though unclear what triggered this. On my assessment today, patient did not seem to have parkinsonian features; at least no rigidity or tremor. Regarding described symptoms of double and "triple" vision, etiology unclear at this time. Patient with no obvious misalignment of eyes and gaze appears conjugate. No other signs of bulbar weakness. MRI not revealing brainstem infarct or structural abnormality like mass lesion. Will recommend to send myasthenia labs though seem unlikely at this time, and would not explain encephalopathy/delirium nor possible dementia.      - F/u MRI official read  [] please change EEG order to video (continuous) EEG  [] agree with psychiatry regarding starting low dose bedtime zyprexa vs seroquel  [] consider ophthalmology consult for "double vs triple vision." (Daughter is strongly requesting)  [] please send myasthenia labs: ACh binding, blocking, and modulating Ab, and anti-MuSk antibodies  [] Patient can follow up with general neurology at [address] [place] [address] 1-2 weeks after discharge. Please instruct the patient to call [phone] to schedule this appointment.

## 2025-06-12 NOTE — PROVIDER CONTACT NOTE (OTHER) - BACKGROUND
83yo M admitted for hallucinations.  PMH of HTN, HLD, BCC of forehead.
83yo M admitted for hallucinations.  PMH of HTN, HLD, BCC of forehead.
85yo M admitted for hallucinations.  PMH of HTN, HLD, BCC of forehead.

## 2025-06-12 NOTE — PROGRESS NOTE ADULT - ASSESSMENT
84M with history of HTN, HLD, and BCC of forehead s/p excision brought in by daughter for hallucinations without confusion, insight intact.       Problem/Plan - 1:  ·  Problem: Visual hallucination.   ·  Plan: No known h/o MCI/dementia, with longstanding "bad dreams" with 2 episodes of visual hallucinations this year, both without confusion and with insight intact, no auditory hallucinations. 1st time was in April in setting of recent surgery/anesthesia likely delirium. This time shortly after waking from dream and in setting of sleep deprivation, potentially delirium vs sign of MCI vs dementia (Lewy body vs Parkinson's)- confusion may be absent and insight preserved in early stages. Theo Bonnet syndrome also in ddx given visual impairment though is a diagnosis of exclusion   - CTH without acute findings   - neuro and psych fu   - check MRI     Problem/Plan - 2:  ·  Problem: HTN (hypertension).   ·  Plan: - continue home amlodipine 5mg daily.    Problem/Plan - 3:  ·  Problem: HLD (hyperlipidemia).   \  ·  Plan: - continue home simvastatin 20mg qhs.    Problem/Plan - 4:  ·  Problem: Prophylactic measure.   ·  Plan: Diet: DASH  DVT ppx: lovenox SQ      DW DAUGHTER AT BEDSIDE

## 2025-06-12 NOTE — PROVIDER CONTACT NOTE (OTHER) - REASON
Pt presents agitation
Pts dtrs report that pt states seeing double vision
Pts dtrs report that pt states seeing double vision

## 2025-06-12 NOTE — PROVIDER CONTACT NOTE (OTHER) - RECOMMENDATIONS
Provider made aware. Provider assessment?

## 2025-06-12 NOTE — CHART NOTE - NSCHARTNOTEFT_GEN_A_CORE
Notified by RN daughter endorsed pt seeing double vision x started 2 days ago. Daughter by bedside - stated neurology came by and did assessment at noon. Daughter did not want provider to disrupt him jack as pt is the calmest she has seen him the past few days. Pt currently mumbling to self, still w/ visual hallucinations. Confirmed w/ neurology - neuro note to follow Notified by RN daughter endorsed pt seeing double vision x started 2 days ago. Daughter by bedside - stated neurology came by and did assessment at noon. Daughter did not want provider to disrupt him jack as pt is the calmest she has seen him the past few days, finally eating a bit. Pt currently mumbling to self, still w/ visual hallucinations. Confirmed w/ neurology - neuro note to follow

## 2025-06-13 LAB
ANION GAP SERPL CALC-SCNC: 17 MMOL/L — HIGH (ref 7–14)
BUN SERPL-MCNC: 31 MG/DL — HIGH (ref 7–23)
CALCIUM SERPL-MCNC: 9.7 MG/DL — SIGNIFICANT CHANGE UP (ref 8.4–10.5)
CHLORIDE SERPL-SCNC: 105 MMOL/L — SIGNIFICANT CHANGE UP (ref 98–107)
CO2 SERPL-SCNC: 21 MMOL/L — LOW (ref 22–31)
CREAT SERPL-MCNC: 1.25 MG/DL — SIGNIFICANT CHANGE UP (ref 0.5–1.3)
EGFR: 57 ML/MIN/1.73M2 — LOW
EGFR: 57 ML/MIN/1.73M2 — LOW
ETHANOL SERPL-MCNC: <10 MG/DL — SIGNIFICANT CHANGE UP
GLUCOSE SERPL-MCNC: 83 MG/DL — SIGNIFICANT CHANGE UP (ref 70–99)
MAGNESIUM SERPL-MCNC: 2.2 MG/DL — SIGNIFICANT CHANGE UP (ref 1.6–2.6)
PHOSPHATE SERPL-MCNC: 3.8 MG/DL — SIGNIFICANT CHANGE UP (ref 2.5–4.5)
POTASSIUM SERPL-MCNC: 3.9 MMOL/L — SIGNIFICANT CHANGE UP (ref 3.5–5.3)
POTASSIUM SERPL-SCNC: 3.9 MMOL/L — SIGNIFICANT CHANGE UP (ref 3.5–5.3)
SODIUM SERPL-SCNC: 143 MMOL/L — SIGNIFICANT CHANGE UP (ref 135–145)
T PALLIDUM AB TITR SER: NEGATIVE — SIGNIFICANT CHANGE UP

## 2025-06-13 PROCEDURE — 99233 SBSQ HOSP IP/OBS HIGH 50: CPT

## 2025-06-13 PROCEDURE — 99232 SBSQ HOSP IP/OBS MODERATE 35: CPT

## 2025-06-13 RX ORDER — LANOLIN/MINERAL OIL/PETROLATUM
1 OINTMENT (GRAM) OPHTHALMIC (EYE)
Refills: 0 | Status: DISCONTINUED | OUTPATIENT
Start: 2025-06-13 | End: 2025-06-25

## 2025-06-13 RX ORDER — OLANZAPINE 10 MG/1
2.5 TABLET ORAL AT BEDTIME
Refills: 0 | Status: DISCONTINUED | OUTPATIENT
Start: 2025-06-13 | End: 2025-06-25

## 2025-06-13 RX ADMIN — Medication 1 APPLICATION(S): at 06:24

## 2025-06-13 RX ADMIN — OLANZAPINE 2.5 MILLIGRAM(S): 10 TABLET ORAL at 21:09

## 2025-06-13 RX ADMIN — Medication 3 MILLIGRAM(S): at 21:09

## 2025-06-13 RX ADMIN — Medication 1 DROP(S): at 17:34

## 2025-06-13 RX ADMIN — ENOXAPARIN SODIUM 40 MILLIGRAM(S): 100 INJECTION SUBCUTANEOUS at 06:23

## 2025-06-13 RX ADMIN — AMLODIPINE BESYLATE 5 MILLIGRAM(S): 10 TABLET ORAL at 06:22

## 2025-06-13 RX ADMIN — ATORVASTATIN CALCIUM 10 MILLIGRAM(S): 80 TABLET, FILM COATED ORAL at 21:10

## 2025-06-13 RX ADMIN — Medication 1 DROP(S): at 12:05

## 2025-06-13 NOTE — PROGRESS NOTE ADULT - ASSESSMENT
A/P  84M with history of HTN, HLD, and BCC of forehead s/p excision brought in by daughter for hallucinations.     #Visual hallucination.   -CT head with No adverse interval change April 2025  -CXR with No focal consolidation.  -without any signs or symptoms of localizing infections   -MRI brain shows No MRI evidence of acute intracranial pathology. Age appropriate, mild involutional changes. Minimal microvascular type changes in the periventricular white matter.  -neuro following  -ophthalmology f/u for "double vs triple vision" per daughter   -delirium precautions.    #HTN  -continue home amlodipine 5mg daily    #HLD (hyperlipidemia).   -cont statin     dvt ppx

## 2025-06-13 NOTE — BH CONSULTATION LIAISON PROGRESS NOTE - OTHER
appears to be responding to internal stimuli limited at this time confused irritable, slightly agitated

## 2025-06-13 NOTE — PROGRESS NOTE ADULT - ASSESSMENT
Mr. Feldman is a 85 yo man w/ PMHx significant for HTN, HLD, and BCC of forehead s/p excision brought in by daughter for intermittent visual hallucinations over the last several months. Seems like this has been a recurrent problem since 2024, although patient mostly aware of his hallucinations. Patient lives with family who provide a lot of support which may be masking some insidious cognitive difficulties, however, patient at the very least independent of all iADLs and mostly independent in general. Some description from family that patient with long standing tendency for conspiracy theories, and distrust of other professionals. There could be some personality component to patient's presentation that is exacerbated by other factors/delirium.    There has been improvements in agitation, however patient still expressing paranoid thoughts and to family, seems to have hypophonia/dysarthria. Also, recent complaints of double vision. No obvious ocular misalignment to my exam and MRI without any stroke or structural changes. No other bulbar signs, SOB, or peripheral weakness in any fatigable pattern to suggest myasthenia. Patient also does not overtly seem parkinsonian on my exams, no rigidity when patient actually relaxes, no resting tremor, not particularly bradykinetic. Patient has not yet allowed me evaluation of gait.    No obvious acute neuropathology on workup so far and no clear reversible toxic metabolic insults. I don't think this represents a case of rapidly progressive dementia such as prion disease or CJD. Patient without myoclonus, clinical seizures. Paraneoplastic syndrome not definitively ruled out but seems unlikely without inflammatory changes on MRI. Would send screening serum paraneoplastic panel. If patient remains off baseline and continued unexplained paranoid ideation/psychosis, could be reasonable to pursue LP. 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 VH would suggest LBD but on my review of MRI, more temporo-parietal regional atrophy (prominent sulci).    [] Video EEG  [] consider ophthalmology consult for "double vs triple vision." (Daughter is strongly requesting)  [] please send myasthenia labs: ACh binding, blocking, and modulating Ab, and anti-MuSk antibodies  [] Please send serum paraneoplastic panel (ENS2)  [] Would tentatively plan for LP if patient tolerates, would probably plan for Monday (either procedure team or neurology)  Cell count x2 in tubes 1 and 4  Glucose and protein  Gram stain and culture  Cryptococcal Ag  PCR panel  Paraneoplastic panel  14-3-3  RT [facility]

## 2025-06-13 NOTE — PROGRESS NOTE ADULT - ASSESSMENT
84M with history of HTN, HLD, and BCC of forehead s/p excision brought in by daughter for hallucinations without confusion, insight intact.       Problem/Plan - 1:  ·  Problem: Visual hallucination.   ·  Plan: No known h/o MCI/dementia, with longstanding "bad dreams" with 2 episodes of visual hallucinations this year, both without confusion and with insight intact, no auditory hallucinations. 1st time was in April in setting of recent surgery/anesthesia likely delirium. This time shortly after waking from dream and in setting of sleep deprivation, potentially delirium vs sign of MCI vs dementia (Lewy body vs Parkinson's)- confusion may be absent and insight preserved in early stages. Theo Bonnet syndrome also in ddx given visual impairment though is a diagnosis of exclusion   - CTH without acute findings   - neuro and psych fu   - MRI reviewed  - EEG pending     Problem/Plan - 2:  ·  Problem: HTN (hypertension).   ·  Plan: - continue home amlodipine 5mg daily.    Problem/Plan - 3:  ·  Problem: HLD (hyperlipidemia).   \par·  Plan: - continue home simvastatin 20mg qhs.    Problem/Plan - 4:  ·  Problem: Prophylactic measure.   ·  Plan: Diet: DASH    dw daughter at bedside

## 2025-06-13 NOTE — CHART NOTE - NSCHARTNOTEFT_GEN_A_CORE
Daughter requesting opthalmology as pt endorses double/triple vision. Reached opthalmology - recommends artificial tears 3-4x/day. If no improvement w/ vision - reconsult. Daughter requesting ophthalmology as pt endorses double/triple vision. Reached opthalmology - recommends artificial tears 3-4x/day. If no improvement w/ vision in 3-4 days - reconsult.

## 2025-06-14 LAB
AMMONIA BLD-MCNC: 34 UMOL/L — SIGNIFICANT CHANGE UP (ref 11–55)
ANION GAP SERPL CALC-SCNC: 13 MMOL/L — SIGNIFICANT CHANGE UP (ref 7–14)
BUN SERPL-MCNC: 27 MG/DL — HIGH (ref 7–23)
CALCIUM SERPL-MCNC: 9.5 MG/DL — SIGNIFICANT CHANGE UP (ref 8.4–10.5)
CHLORIDE SERPL-SCNC: 107 MMOL/L — SIGNIFICANT CHANGE UP (ref 98–107)
CO2 SERPL-SCNC: 24 MMOL/L — SIGNIFICANT CHANGE UP (ref 22–31)
CREAT SERPL-MCNC: 0.98 MG/DL — SIGNIFICANT CHANGE UP (ref 0.5–1.3)
EGFR: 76 ML/MIN/1.73M2 — SIGNIFICANT CHANGE UP
EGFR: 76 ML/MIN/1.73M2 — SIGNIFICANT CHANGE UP
GLUCOSE SERPL-MCNC: 104 MG/DL — HIGH (ref 70–99)
MAGNESIUM SERPL-MCNC: 2.3 MG/DL — SIGNIFICANT CHANGE UP (ref 1.6–2.6)
PHOSPHATE SERPL-MCNC: 2.7 MG/DL — SIGNIFICANT CHANGE UP (ref 2.5–4.5)
POTASSIUM SERPL-MCNC: 3.6 MMOL/L — SIGNIFICANT CHANGE UP (ref 3.5–5.3)
POTASSIUM SERPL-SCNC: 3.6 MMOL/L — SIGNIFICANT CHANGE UP (ref 3.5–5.3)
SODIUM SERPL-SCNC: 144 MMOL/L — SIGNIFICANT CHANGE UP (ref 135–145)

## 2025-06-14 RX ADMIN — OLANZAPINE 2.5 MILLIGRAM(S): 10 TABLET ORAL at 21:58

## 2025-06-14 RX ADMIN — Medication 3 MILLIGRAM(S): at 21:57

## 2025-06-14 RX ADMIN — Medication 1 DROP(S): at 11:48

## 2025-06-14 RX ADMIN — Medication 1 DROP(S): at 17:30

## 2025-06-14 RX ADMIN — Medication 1 DROP(S): at 01:22

## 2025-06-14 RX ADMIN — Medication 1 DROP(S): at 04:54

## 2025-06-14 RX ADMIN — AMLODIPINE BESYLATE 5 MILLIGRAM(S): 10 TABLET ORAL at 04:54

## 2025-06-14 RX ADMIN — ENOXAPARIN SODIUM 40 MILLIGRAM(S): 100 INJECTION SUBCUTANEOUS at 04:53

## 2025-06-14 RX ADMIN — Medication 1 APPLICATION(S): at 04:55

## 2025-06-14 RX ADMIN — ATORVASTATIN CALCIUM 10 MILLIGRAM(S): 80 TABLET, FILM COATED ORAL at 22:00

## 2025-06-14 NOTE — PROGRESS NOTE ADULT - ASSESSMENT
84M with history of HTN, HLD, and BCC of forehead s/p excision brought in by daughter for hallucinations without confusion, insight intact.       Problem/Plan - 1:  ·  Problem: Visual hallucination.   ·  Plan: No known h/o MCI/dementia, with longstanding "bad dreams" with 2 episodes of visual hallucinations this year, both without confusion and with insight intact, no auditory hallucinations. 1st time was in April in setting of recent surgery/anesthesia likely delirium. This time shortly after waking from dream and in setting of sleep deprivation, potentially delirium vs sign of MCI vs dementia (Lewy body vs Parkinson's)- confusion may be absent and insight preserved in early stages. Theo Bonnet syndrome also in ddx given visual impairment though is a diagnosis of exclusion   - CTH without acute findings   - neuro and psych fu   - MRI reviewed  - EEG pending     Problem/Plan - 2:  ·  Problem: HTN (hypertension).   ·  Plan: - continue home amlodipine 5mg daily.    Problem/Plan - 3:  ·  Problem: HLD (hyperlipidemia).   ·  Plan: - continue home simvastatin 20mg qhs.    Problem/Plan - 4:·  Problem: Prophylactic measure.   ·  Plan: Diet: DASH    dw daughter at bedside

## 2025-06-14 NOTE — PROGRESS NOTE ADULT - ASSESSMENT
A/P  84M with history of HTN, HLD, and BCC of forehead s/p excision brought in by daughter for hallucinations.     #Visual hallucination.   -CT head with No adverse interval change April 2025  -CXR with No focal consolidation.  -without any signs or symptoms of localizing infections   -MRI brain shows No MRI evidence of acute intracranial pathology. Age appropriate, mild involutional changes. Minimal microvascular type changes in the periventricular white matter.  -neuro following  -ophthalmology f/u for "double vs triple vision" per daughter   -delirium precautions.    #HTN  -continue home amlodipine 5mg daily    #HLD (hyperlipidemia).   -cont statin     dvt ppx     55 minutes spent on total encounter; more than 50% of the visit was spent counseling and/or coordinating care by the attending physician.

## 2025-06-15 LAB — ZINC SERPL-MCNC: 69 UG/DL — SIGNIFICANT CHANGE UP (ref 44–115)

## 2025-06-15 RX ADMIN — OLANZAPINE 2.5 MILLIGRAM(S): 10 TABLET ORAL at 22:10

## 2025-06-15 RX ADMIN — ATORVASTATIN CALCIUM 10 MILLIGRAM(S): 80 TABLET, FILM COATED ORAL at 22:10

## 2025-06-15 RX ADMIN — Medication 1 DROP(S): at 00:59

## 2025-06-15 RX ADMIN — Medication 1 DROP(S): at 12:01

## 2025-06-15 RX ADMIN — Medication 1 DROP(S): at 05:41

## 2025-06-15 RX ADMIN — AMLODIPINE BESYLATE 5 MILLIGRAM(S): 10 TABLET ORAL at 05:41

## 2025-06-15 RX ADMIN — Medication 3 MILLIGRAM(S): at 22:10

## 2025-06-15 RX ADMIN — Medication 1 APPLICATION(S): at 05:41

## 2025-06-15 RX ADMIN — ENOXAPARIN SODIUM 40 MILLIGRAM(S): 100 INJECTION SUBCUTANEOUS at 05:40

## 2025-06-16 LAB
ANION GAP SERPL CALC-SCNC: 16 MMOL/L — HIGH (ref 7–14)
BUN SERPL-MCNC: 25 MG/DL — HIGH (ref 7–23)
CALCIUM SERPL-MCNC: 9.1 MG/DL — SIGNIFICANT CHANGE UP (ref 8.4–10.5)
CHLORIDE SERPL-SCNC: 109 MMOL/L — HIGH (ref 98–107)
CO2 SERPL-SCNC: 21 MMOL/L — LOW (ref 22–31)
CREAT SERPL-MCNC: 0.84 MG/DL — SIGNIFICANT CHANGE UP (ref 0.5–1.3)
EGFR: 86 ML/MIN/1.73M2 — SIGNIFICANT CHANGE UP
EGFR: 86 ML/MIN/1.73M2 — SIGNIFICANT CHANGE UP
GLUCOSE SERPL-MCNC: 92 MG/DL — SIGNIFICANT CHANGE UP (ref 70–99)
HCT VFR BLD CALC: 44.7 % — SIGNIFICANT CHANGE UP (ref 39–50)
HGB BLD-MCNC: 14.5 G/DL — SIGNIFICANT CHANGE UP (ref 13–17)
MAGNESIUM SERPL-MCNC: 2.2 MG/DL — SIGNIFICANT CHANGE UP (ref 1.6–2.6)
MCHC RBC-ENTMCNC: 26.9 PG — LOW (ref 27–34)
MCHC RBC-ENTMCNC: 32.4 G/DL — SIGNIFICANT CHANGE UP (ref 32–36)
MCV RBC AUTO: 82.8 FL — SIGNIFICANT CHANGE UP (ref 80–100)
NRBC # BLD AUTO: 0 K/UL — SIGNIFICANT CHANGE UP (ref 0–0)
NRBC # FLD: 0 K/UL — SIGNIFICANT CHANGE UP (ref 0–0)
NRBC BLD AUTO-RTO: 0 /100 WBCS — SIGNIFICANT CHANGE UP (ref 0–0)
PHOSPHATE SERPL-MCNC: 2.9 MG/DL — SIGNIFICANT CHANGE UP (ref 2.5–4.5)
PLATELET # BLD AUTO: 203 K/UL — SIGNIFICANT CHANGE UP (ref 150–400)
POTASSIUM SERPL-MCNC: 3.7 MMOL/L — SIGNIFICANT CHANGE UP (ref 3.5–5.3)
POTASSIUM SERPL-SCNC: 3.7 MMOL/L — SIGNIFICANT CHANGE UP (ref 3.5–5.3)
RBC # BLD: 5.4 M/UL — SIGNIFICANT CHANGE UP (ref 4.2–5.8)
RBC # FLD: 13.2 % — SIGNIFICANT CHANGE UP (ref 10.3–14.5)
SODIUM SERPL-SCNC: 146 MMOL/L — HIGH (ref 135–145)
WBC # BLD: 5.87 K/UL — SIGNIFICANT CHANGE UP (ref 3.8–10.5)
WBC # FLD AUTO: 5.87 K/UL — SIGNIFICANT CHANGE UP (ref 3.8–10.5)

## 2025-06-16 PROCEDURE — 99232 SBSQ HOSP IP/OBS MODERATE 35: CPT

## 2025-06-16 RX ORDER — SODIUM CHLORIDE 9 G/1000ML
1000 INJECTION, SOLUTION INTRAVENOUS
Refills: 0 | Status: DISCONTINUED | OUTPATIENT
Start: 2025-06-16 | End: 2025-06-20

## 2025-06-16 RX ADMIN — AMLODIPINE BESYLATE 5 MILLIGRAM(S): 10 TABLET ORAL at 06:17

## 2025-06-16 RX ADMIN — Medication 1 DROP(S): at 13:15

## 2025-06-16 RX ADMIN — Medication 3 MILLIGRAM(S): at 22:35

## 2025-06-16 RX ADMIN — Medication 1 DROP(S): at 18:49

## 2025-06-16 RX ADMIN — OLANZAPINE 2.5 MILLIGRAM(S): 10 TABLET ORAL at 22:35

## 2025-06-16 RX ADMIN — ATORVASTATIN CALCIUM 10 MILLIGRAM(S): 80 TABLET, FILM COATED ORAL at 22:35

## 2025-06-16 RX ADMIN — Medication 1 APPLICATION(S): at 06:18

## 2025-06-16 RX ADMIN — Medication 1 DROP(S): at 06:17

## 2025-06-16 RX ADMIN — ENOXAPARIN SODIUM 40 MILLIGRAM(S): 100 INJECTION SUBCUTANEOUS at 06:17

## 2025-06-16 RX ADMIN — SODIUM CHLORIDE 80 MILLILITER(S): 9 INJECTION, SOLUTION INTRAVENOUS at 09:17

## 2025-06-16 NOTE — PROGRESS NOTE ADULT - ASSESSMENT
Mr. Feldman is a 85 yo man w/ PMHx significant for HTN, HLD, and BCC of forehead s/p excision brought in by daughter for intermittent visual hallucinations over the last several months. Seems like this has been a recurrent problem since 2024, although patient mostly aware of his hallucinations. Some signs seem to suggest chronic progression, including sundowning at home, long history of paranoid statements. Some of patient's current lack of participation could be related to underlying personalty component given he is described by family as severe "conpiracy theorist."     There has been improvements in agitation, however patient still expressing paranoid thoughts. Also, recent complaints of double vision. No obvious ocular misalignment to my exam and MRI without any stroke or structural changes. No other bulbar signs, SOB, or peripheral weakness in any fatigable pattern to suggest myasthenia. Patient also does not overtly seem parkinsonian on my exams, no rigidity when patient actually relaxes, no resting tremor, not particularly bradykinetic. Patient has been declining examination and so far, not yet allowed me evaluation of gait.    No obvious acute neuropathology on workup so far and no clear reversible toxic metabolic insults. I don't think this represents a case of rapidly progressive dementia such as prion disease or CJD. Patient without myoclonus, clinical seizures. Paraneoplastic syndrome not definitively ruled out but seems unlikely without inflammatory changes on MRI. Would send screening serum paraneoplastic panel. The risks and harm of an aggressive workup that would likely require sedation and are aimed at etiologies that seem unlikely at this time, seem to outweight unlikely diagnosis of encephalitis.   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 VH would suggest LBD but on my review of MRI, more temporo-parietal regional atrophy (prominent sulci).    [] Attempt routine EEG  [] follow up myasthenia labs  [] follow up serum paraneoplastic panel (ENS2)  [] LP can be reasonable deferred for now, unlikely encephalitis given fluctuation and normal MRI.  [] agree with standing low dose antipsychotic, may require further titration

## 2025-06-16 NOTE — DIETITIAN INITIAL EVALUATION ADULT - PHYSCIAL ASSESSMENT
Daughter reports patient's UBW of ~155lbs. Dosing weight 72.6kg /159.7lbs (6/9). Daily weight 72.3kg/159lbs (6/11). Weight trend is relatively stable per [facility] weight history: 72.6kg (6/9); 72.9kg(4/18); 72.4kg(11/19); 72.6kg(9/25/24).

## 2025-06-16 NOTE — DIETITIAN INITIAL EVALUATION ADULT - PERTINENT LABORATORY DATA
06-16    146[H]  |  109[H]  |  25[H]  ----------------------------<  92  3.7   |  21[L]  |  0.84    Ca    9.1      16 Jun 2025 05:55  Phos  2.9     06-16  Mg     2.20     06-16

## 2025-06-16 NOTE — DIETITIAN INITIAL EVALUATION ADULT - NS FNS DIET ORDER
Diet, DASH/TLC:   Sodium & Cholesterol Restricted  Easy to Chew (EASYTOCHEW) (06-09-25 @ 22:52) [Active]

## 2025-06-16 NOTE — DIETITIAN INITIAL EVALUATION ADULT - ADD RECOMMEND
1. Recommend adding Ensure Plus High Protein Therapeutic Shake 1x daily (350 kcal, 20g protein), chocolate flavor per daughter requested.  2. Encourage PO intake and honor food preferences as able.   3. Please consistently document %PO intake in nursing flowsheets   4. Monitor PO intake, Labs, weights, BMs, and skin integrity.

## 2025-06-16 NOTE — DIETITIAN INITIAL EVALUATION ADULT - PERTINENT MEDS FT
MEDICATIONS  (STANDING):  amLODIPine   Tablet 5 milliGRAM(s) Oral daily  artificial tears (preservative free) Ophthalmic Solution 1 Drop(s) Both EYES four times a day  atorvastatin 10 milliGRAM(s) Oral at bedtime  chlorhexidine 2% Cloths 1 Application(s) Topical <User Schedule>  dextrose 5% + sodium chloride 0.45%. 1000 milliLiter(s) (80 mL/Hr) IV Continuous <Continuous>  enoxaparin Injectable 40 milliGRAM(s) SubCutaneous every 24 hours  melatonin 3 milliGRAM(s) Oral at bedtime  OLANZapine 2.5 milliGRAM(s) Oral at bedtime    MEDICATIONS  (PRN):  acetaminophen     Tablet .. 650 milliGRAM(s) Oral every 6 hours PRN Temp greater or equal to 38C (100.4F), Mild Pain (1 - 3)  aluminum hydroxide/magnesium hydroxide/simethicone Suspension 30 milliLiter(s) Oral every 4 hours PRN Dyspepsia  OLANZapine 2.5 milliGRAM(s) Oral every 6 hours PRN agitation  ondansetron Injectable 4 milliGRAM(s) IV Push every 8 hours PRN Nausea and/or Vomiting

## 2025-06-16 NOTE — DIETITIAN INITIAL EVALUATION ADULT - PROBLEM SELECTOR PLAN 1
No known h/o MCI/dementia, with longstanding "bad dreams" with 2 episodes of visual hallucinations this year, both without confusion and with insight intact, no auditory hallucinations. 1st time was in April in setting of recent surgery/anesthesia likely delirium. This time shortly after waking from dream and in setting of sleep deprivation, potentially delirium vs sign of MCI vs dementia (Lewy body vs Parkinson's)- confusion may be absent and insight preserved in early stages. Hteo Bonnet syndrome also in ddx given visual impairment though is a diagnosis of exclusion   - CTH without acute findings   - without any signs or symptoms of any localizing infections   - TSH, B12 wnl     - obtain MRI chente w/w/o contrast   - daughter requesting neurology evaluation inpatient, consult in AM   - discussed w/ daughter outpatient neuropsychiatric evaluation after discharge   - delirium precautions

## 2025-06-16 NOTE — DIETITIAN INITIAL EVALUATION ADULT - ORAL NUTRITION SUPPLEMENTS
Recommend adding Ensure Plus High Protein Therapeutic Shake 1x daily (350 kcal, 20g protein), chocolate flavor per daughter requested.

## 2025-06-16 NOTE — DIETITIAN INITIAL EVALUATION ADULT - PROBLEM SELECTOR PROBLEM 3
04/01/24    Ron Ballard   [address] [facility] [address]           Dear Ron Ballard     Our records indicate that you have outstanding lab work and/or testing that was ordered for you and has not yet been completed:  Lab Frequency Next Occurrence   Lipid Panel Once 03/30/2024   Hepatic Function Panel (7) [E] Once 03/30/2024      To provide you with the best possible care, please complete these orders at your earliest convenience. If you have recently completed these orders please disregard this letter.   To schedule imaging, or outpatient tests please call Central Scheduling at [phone].  For any blood work needed, you can get this done at the Reference Lab in our [place] office anytime Monday-Friday from 8am-4:15pm and you do not need an appointment. They are closed Saturday and Sunday. If you need a time outside of these hours please call us to schedule an appointment.   *If you prefer to use [facility] for your labs please let us know so we can fax your orders.     Thank you,    [facility]      
HLD (hyperlipidemia)

## 2025-06-16 NOTE — DIETITIAN INITIAL EVALUATION ADULT - REASON FOR ADMISSION
Per chart review, 84-year-old male with history of HTN, HLD, and BCC of forehead s/p excision brought in by daughter for hallucinations without confusion, insight intact.

## 2025-06-16 NOTE — PROGRESS NOTE ADULT - ASSESSMENT
84M with history of HTN, HLD, and BCC of forehead s/p excision brought in by daughter for hallucinations without confusion, insight intact.       Problem/Plan - 1:  ·  Problem: Visual hallucination.   ·  Plan: No known h/o MCI/dementia, with longstanding "bad dreams" with 2 episodes of visual hallucinations this year, both without confusion and with insight intact, no auditory hallucinations. 1st time was in April in setting of recent surgery/anesthesia likely delirium. This time shortly after waking from dream and in setting of sleep deprivation, potentially delirium vs sign of MCI vs dementia (Lewy body vs Parkinson's)- confusion may be absent and insight preserved in early stages. Theo Bonnet syndrome also in ddx given visual impairment though is a diagnosis of exclusion   - CTH without acute findings   - neuro and psych fu   - MRI reviewed  - psych fu     Problem/Plan - 2:  ·  Problem: HTN (hypertension). ·  Plan: - continue home amlodipine 5mg daily.    Problem/Plan - 3:  ·  Problem: HLD (hyperlipidemia).   ·  Plan: - continue home simvastatin 20mg qhs.    Problem/Plan - 4:·  Problem: Prophylactic measure.   ·  Plan: Diet: DASH  dw daughter at bedside

## 2025-06-16 NOTE — DIETITIAN INITIAL EVALUATION ADULT - REASON
Unable to conduct NFPE as doctor was doing examination for patient during visit. No overt physical signs of malnutrition from observation

## 2025-06-16 NOTE — DIETITIAN INITIAL EVALUATION ADULT - ETIOLOGY
Physician at bedside.  Dr. Earl is at the bedside talking to the patient.   inadequate oral intake in house

## 2025-06-16 NOTE — DIETITIAN INITIAL EVALUATION ADULT - ORAL INTAKE PTA/DIET HISTORY
Met patient and his daughter at bedside. Patient appears confused and doctor was dosing examination for patient during visit. Comprehensive chart review completed. Obtained collateral from his daughter at bedside. Daughter reports patient has normal appetite / PO intake PTA. Prior use of Ensure nutrition shakes sometimes PTA. No other reported issues.

## 2025-06-16 NOTE — DIETITIAN INITIAL EVALUATION ADULT - OTHER INFO
Per daughter, patient has ~50% of intake in house x 7 days. Per RN flowsheets intake is 26-75%. No reported any in house nausea, vomiting, diarrhea, or constipation. Last BM noted on 6/15 per RN flowsheets. Patient receives easy to chew consistency. No known food allergies. D5 IV fluids noted for hydration. Recommend adding Ensure Plus High Protein Therapeutic Shake 1x daily (350 kcal, 20g protein), chocolate flavor per daughter requested. Encourage PO intake and honor food preferences as able. Educated on the importance of meeting adequate protein-energy needs. Encouraged patient to consume small frequent meals to increase protein calorie intake. Encouraged consumption of oral nutrition supplement to optimize protein-energy intake. RD to remain available for further nutritional interventions as indicated

## 2025-06-17 LAB
ACRM BINDING ANTIBODY: 0 NMOL/L — SIGNIFICANT CHANGE UP
ANION GAP SERPL CALC-SCNC: 11 MMOL/L — SIGNIFICANT CHANGE UP (ref 7–14)
BASOPHILS # BLD AUTO: 0.01 K/UL — SIGNIFICANT CHANGE UP (ref 0–0.2)
BASOPHILS NFR BLD AUTO: 0.1 % — SIGNIFICANT CHANGE UP (ref 0–2)
BUN SERPL-MCNC: 23 MG/DL — SIGNIFICANT CHANGE UP (ref 7–23)
CALCIUM SERPL-MCNC: 8.9 MG/DL — SIGNIFICANT CHANGE UP (ref 8.4–10.5)
CHLORIDE SERPL-SCNC: 110 MMOL/L — HIGH (ref 98–107)
CO2 SERPL-SCNC: 23 MMOL/L — SIGNIFICANT CHANGE UP (ref 22–31)
CREAT SERPL-MCNC: 0.91 MG/DL — SIGNIFICANT CHANGE UP (ref 0.5–1.3)
EGFR: 83 ML/MIN/1.73M2 — SIGNIFICANT CHANGE UP
EGFR: 83 ML/MIN/1.73M2 — SIGNIFICANT CHANGE UP
EOSINOPHIL # BLD AUTO: 0.11 K/UL — SIGNIFICANT CHANGE UP (ref 0–0.5)
EOSINOPHIL NFR BLD AUTO: 1.6 % — SIGNIFICANT CHANGE UP (ref 0–6)
GLUCOSE SERPL-MCNC: 149 MG/DL — HIGH (ref 70–99)
HCT VFR BLD CALC: 48.3 % — SIGNIFICANT CHANGE UP (ref 39–50)
HGB BLD-MCNC: 15.9 G/DL — SIGNIFICANT CHANGE UP (ref 13–17)
IANC: 4.24 K/UL — SIGNIFICANT CHANGE UP (ref 1.8–7.4)
IMM GRANULOCYTES NFR BLD AUTO: 0.3 % — SIGNIFICANT CHANGE UP (ref 0–0.9)
INTERPRETATION MG: SIGNIFICANT CHANGE UP
LYMPHOCYTES # BLD AUTO: 2.08 K/UL — SIGNIFICANT CHANGE UP (ref 1–3.3)
LYMPHOCYTES # BLD AUTO: 29.8 % — SIGNIFICANT CHANGE UP (ref 13–44)
MAGNESIUM SERPL-MCNC: 2.1 MG/DL — SIGNIFICANT CHANGE UP (ref 1.6–2.6)
MCHC RBC-ENTMCNC: 27.3 PG — SIGNIFICANT CHANGE UP (ref 27–34)
MCHC RBC-ENTMCNC: 32.9 G/DL — SIGNIFICANT CHANGE UP (ref 32–36)
MCV RBC AUTO: 82.8 FL — SIGNIFICANT CHANGE UP (ref 80–100)
MONOCYTES # BLD AUTO: 0.52 K/UL — SIGNIFICANT CHANGE UP (ref 0–0.9)
MONOCYTES NFR BLD AUTO: 7.4 % — SIGNIFICANT CHANGE UP (ref 2–14)
NEUTROPHILS # BLD AUTO: 4.24 K/UL — SIGNIFICANT CHANGE UP (ref 1.8–7.4)
NEUTROPHILS NFR BLD AUTO: 60.8 % — SIGNIFICANT CHANGE UP (ref 43–77)
NRBC # BLD AUTO: 0 K/UL — SIGNIFICANT CHANGE UP (ref 0–0)
NRBC # FLD: 0 K/UL — SIGNIFICANT CHANGE UP (ref 0–0)
NRBC BLD AUTO-RTO: 0 /100 WBCS — SIGNIFICANT CHANGE UP (ref 0–0)
P/Q TYPE ANTIBODY: 0 NMOL/L — SIGNIFICANT CHANGE UP
PHOSPHATE SERPL-MCNC: 2.8 MG/DL — SIGNIFICANT CHANGE UP (ref 2.5–4.5)
PLATELET # BLD AUTO: 208 K/UL — SIGNIFICANT CHANGE UP (ref 150–400)
POTASSIUM SERPL-MCNC: 3.9 MMOL/L — SIGNIFICANT CHANGE UP (ref 3.5–5.3)
POTASSIUM SERPL-SCNC: 3.9 MMOL/L — SIGNIFICANT CHANGE UP (ref 3.5–5.3)
RBC # BLD: 5.83 M/UL — HIGH (ref 4.2–5.8)
RBC # FLD: 13.2 % — SIGNIFICANT CHANGE UP (ref 10.3–14.5)
SODIUM SERPL-SCNC: 144 MMOL/L — SIGNIFICANT CHANGE UP (ref 135–145)
WBC # BLD: 6.98 K/UL — SIGNIFICANT CHANGE UP (ref 3.8–10.5)
WBC # FLD AUTO: 6.98 K/UL — SIGNIFICANT CHANGE UP (ref 3.8–10.5)

## 2025-06-17 RX ORDER — NYSTATIN 100000 [USP'U]/G
1 CREAM TOPICAL THREE TIMES A DAY
Refills: 0 | Status: DISCONTINUED | OUTPATIENT
Start: 2025-06-17 | End: 2025-06-25

## 2025-06-17 RX ADMIN — OLANZAPINE 2.5 MILLIGRAM(S): 10 TABLET ORAL at 21:54

## 2025-06-17 RX ADMIN — Medication 1 DROP(S): at 19:06

## 2025-06-17 RX ADMIN — ATORVASTATIN CALCIUM 10 MILLIGRAM(S): 80 TABLET, FILM COATED ORAL at 21:54

## 2025-06-17 RX ADMIN — ENOXAPARIN SODIUM 40 MILLIGRAM(S): 100 INJECTION SUBCUTANEOUS at 05:26

## 2025-06-17 RX ADMIN — Medication 1 APPLICATION(S): at 07:12

## 2025-06-17 RX ADMIN — NYSTATIN 1 APPLICATION(S): 100000 CREAM TOPICAL at 21:55

## 2025-06-17 RX ADMIN — Medication 1 DROP(S): at 05:26

## 2025-06-17 RX ADMIN — Medication 3 MILLIGRAM(S): at 21:54

## 2025-06-17 RX ADMIN — Medication 1 DROP(S): at 12:36

## 2025-06-17 RX ADMIN — AMLODIPINE BESYLATE 5 MILLIGRAM(S): 10 TABLET ORAL at 05:25

## 2025-06-17 NOTE — BH CONSULTATION LIAISON PROGRESS NOTE - NSBHATTESTCOMMENTATTENDFT_PSY_A_CORE
agree with above, patient improved less delirious hallucinations more intermittent, sleeping/eating better, no EPS, ok to c/w meds as is, should f/u with [facility] after DC from rehab. discussed with family.

## 2025-06-17 NOTE — BH CONSULTATION LIAISON PROGRESS NOTE - NSBHPSYCHOLCOGABN_PSY_A_CORE
disoriented to time/disoriented to situation
disoriented to time/disoriented to place/disoriented to situation
disoriented to time/disoriented to place/disoriented to situation

## 2025-06-17 NOTE — PHYSICAL THERAPY INITIAL EVALUATION ADULT - ADDITIONAL COMMENTS
pt's social history obtained via pt's daughter at bedside due to Pt presenting with confusion. Pt lives alone in an apartment with 4 stairs to enter; Pt resides on the first floor. prior to admission Pt ambulated independently, but "slowly" and was able to perform ADLs, but "slowly". pt's daughter was unsure how many recent falls the Pt had. Medical equipment: straight cane.     Pt. left comfortable in bed with all tubes/lines intact, head of the bed elevated, +bed alarm, call bell in reach and in NAD. Sheba LUND, aware of session and pt current position. Sheba LUND, provider Beatriz, and pt's daughter present at bedside

## 2025-06-17 NOTE — BH CONSULTATION LIAISON PROGRESS NOTE - OTHER
talks about events in previous days with paranoia, as per family no new paranoia today limited at this time improving  confused, improving

## 2025-06-17 NOTE — BH CONSULTATION LIAISON PROGRESS NOTE - NSBHFUPINTERVALHXFT_PSY_A_CORE
Chart reviewed, medication compliant, received Zyprexa 2.5mg prn yesterday afternoon, seen at bedside today, at bedside patient noted to be looking across the room towards the empty bed and talking, difficult to redirect. Patient would not speak English today, mood appears irritable and slighted agitated, therefore, Mosotho  Curt, ID#[id number] utilized,  states "patient speaking in short sentences which are not related at all," the connections are not logical," therefore, interview limited today.      
Patient was seen and assessed at bedside. Patient is alert, aware he is in the hospital, knows his name, daughters name, month and year. Patient more calm, cooperative, speaking in English. Apologized to MD in room for kicking him out on previous exams. Patient speaks about events that happened days ago in the hospital with paranoid flare however no new events of paranoia and no new AH today as per daughter. No si or Hi. reviewed importance of outpatient care as well as BBW on Zyprexa medication.    
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 Discussed r/b/i of antipsychotics including that of black box warning / stroke/cardiac/mortality risk etc, amenable to trying low dose. 
Chart reviewed, patient received Melatonin 3mg prn overnight, seen for follow up, alert and oriented to self only, confused, poor eye contact, calm, incessantly talking and singing with impaired articulation, frequently looking up and appearing to be responding to internal stimuli, states he is kissing someone and forms a kiss motion on his lips. Interview limited due to patient's confusion.

## 2025-06-17 NOTE — PHYSICAL THERAPY INITIAL EVALUATION ADULT - NSPTDISCHREC_GEN_A_CORE
please continue to follow updated PT notes for pt's participation in therapy sessions/Sub-acute Rehab

## 2025-06-17 NOTE — BH CONSULTATION LIAISON PROGRESS NOTE - NSBHFUPINTERVALCCFT_PSY_A_CORE
Patient states, "I feel very good!"
Patient states, "I'm in the [facility]."
speaking english, apologizes for kicking MD out of room on previous exams 
You met with the man from [facility]!"

## 2025-06-17 NOTE — PHYSICAL THERAPY INITIAL EVALUATION ADULT - PASSIVE RANGE OF MOTION EXAMINATION, REHAB EVAL
left ankle dorsiflexion: -5 degrees/bilateral upper extremity Passive ROM was WFL (within functional limits)/bilateral lower extremity Passive ROM was WFL (within functional limits)/deficits as listed below

## 2025-06-17 NOTE — BH CONSULTATION LIAISON PROGRESS NOTE - NSBHATTESTBILLING_PSY_A_CORE
88498-[id number] OBS or IP - moderate complexity OR 35-49 mins
Billing in another system
Billing in another system
55325-[id number] OBS or IP - moderate complexity OR 35-49 mins

## 2025-06-17 NOTE — BH CONSULTATION LIAISON PROGRESS NOTE - NSBHASSESSMENTFT_PSY_ALL_CORE
81 yr old M w/ PMHx significant for HTN, HLD, and BCC of forehead s/p excision brought in by daughter from home for visual hallucinations.     Patient may have delirium superimposed on subclinical lewy body dementia, some changes in vision over past 1 day as well though not actual vision loss, unlikely to have dex bonnet syndrome. Discussed with family, neuro, primary, agree with plan as below. OK to start standing antipsychotic for agitation, sleep, paranoia, discussed BBB with family as above.     - patient on hello 2, no psychiatric need for CO  - followed by neuro , agree with medical work up for VH ( check UA, Utox, ETOH level, TSH, T3/T4, RPR, vitamin B1, B6, B12, folate, homocysteine, methylmalonic acid, lactate, ammonia, Cu, SPEP, ESR, CRP, Zn, MRI brain w/wo, EEG ?)  - BEGIN Zyprexa 1.25mg qHS standing   - PRN for agitation: Zyprexa 2.5mg if qtc < 500  -- please get EKG when patient calm
 81 yr old M w/ PMHx significant for HTN, HLD, and BCC of forehead s/p excision brought in by daughter from home for visual hallucinations.     Patient may have delirium superimposed on subclinical lewy body dementia, some changes in vision over past 1 day as well though not actual vision loss, unlikely to have dex bonnet syndrome. Discussed with family, neuro, primary, agree with plan as below. OK to start standing antipsychotic for agitation, sleep, paranoia, discussed BBB with family as above.     6/12: confused, appears to be responding to internal stimuli, discussed with primary team recommendations below    PLAN:  - patient on hello 2, no psychiatric need for CO  - followed by neuro , agree with medical work up for VH ( check UA, Utox, ETOH level, TSH, T3/T4, RPR, vitamin B1, B6, B12, folate, homocysteine, methylmalonic acid, lactate, ammonia, Cu, SPEP, ESR, CRP, Zn, MRI brain w/wo, EEG ?)  - **BEGIN Zyprexa 1.25mg qHS standing  - **START melatonin 3mg standing at 8pm  - PRN for agitation: Zyprexa 2.5mg po/im q6h if qtc < 500-do not give Ativan iv/im within 1 hour of Zyprexa im dt risk of sedation and or respiratory depression  -- please get EKG when patient calm  
 81 yr old M w/ PMHx significant for HTN, HLD, and BCC of forehead s/p excision brought in by daughter from home for visual hallucinations.     Patient may have delirium superimposed on subclinical lewy body dementia, some changes in vision over past 1 day as well though not actual vision loss, unlikely to have dex bonnet syndrome. Discussed with family, neuro, primary, agree with plan as below. OK to start standing antipsychotic for agitation, sleep, paranoia, discussed BBB with family as above.     6/12: confused, appears to be responding to internal stimuli, discussed with primary team recommendations below  6/13: irritable, c/w responding to internal stimuli, interview limited    PLAN:  - patient on hello 2, no psychiatric need for CO  - followed by neuro , agree with medical work up for VH ( check UA, Utox, ETOH level, TSH, T3/T4, RPR, vitamin B1, B6, B12, folate, homocysteine, methylmalonic acid, lactate, ammonia, Cu, SPEP, ESR, CRP, Zn, MRI brain w/wo, EEG ?)  - **INCREASE Zyprexa to 2.5mg qHS standing  - **START melatonin 3mg standing at 8pm  - PRN for agitation: Zyprexa 2.5mg po/im q6h if qtc < 500-do not give Ativan iv/im within 1 hour of Zyprexa im dt risk of sedation and or respiratory depression  -- please get EKG when patient calm  
 81 yr old M w/ PMHx significant for HTN, HLD, and BCC of forehead s/p excision brought in by daughter from home for visual hallucinations.     Patient may have delirium superimposed on subclinical lewy body dementia, some changes in vision over past 1 day as well though not actual vision loss, unlikely to have dex bonnet syndrome. Discussed with family, neuro, primary, agree with plan as below. OK to start standing antipsychotic for agitation, sleep, paranoia, discussed BBB with family as above.     6/12: confused, appears to be responding to internal stimuli, discussed with primary team recommendations below  6/13: irritable, c/w responding to internal stimuli, interview limited  6/16: more calm, no new AH as per family, sleep improved with zyprexa     PLAN:  - patient on hello 2, no psychiatric need for CO  - followed by neuro , agree with medical work up for VH ( check UA, Utox, ETOH level, TSH, T3/T4, RPR, vitamin B1, B6, B12, folate, homocysteine, methylmalonic acid, lactate, ammonia, Cu, SPEP, ESR, CRP, Zn, MRI brain w/wo, EEG ?)  - continue Zyprexa to 2.5mg qHS standing  - continue melatonin 3mg standing at 8pm  - PRN for agitation: Zyprexa 2.5mg po/im q6h if qtc < 500-do not give Ativan iv/im within 1 hour of Zyprexa im dt risk of sedation and or respiratory depression  -- antipsychotics can only be given if qtc < 500

## 2025-06-17 NOTE — BH CONSULTATION LIAISON PROGRESS NOTE - CURRENT MEDICATION
MEDICATIONS  (STANDING):  amLODIPine   Tablet 5 milliGRAM(s) Oral daily  artificial tears (preservative free) Ophthalmic Solution 1 Drop(s) Both EYES four times a day  atorvastatin 10 milliGRAM(s) Oral at bedtime  chlorhexidine 2% Cloths 1 Application(s) Topical <User Schedule>  dextrose 5% + sodium chloride 0.45%. 1000 milliLiter(s) (80 mL/Hr) IV Continuous <Continuous>  enoxaparin Injectable 40 milliGRAM(s) SubCutaneous every 24 hours  melatonin 3 milliGRAM(s) Oral at bedtime  OLANZapine 2.5 milliGRAM(s) Oral at bedtime    MEDICATIONS  (PRN):  acetaminophen     Tablet .. 650 milliGRAM(s) Oral every 6 hours PRN Temp greater or equal to 38C (100.4F), Mild Pain (1 - 3)  aluminum hydroxide/magnesium hydroxide/simethicone Suspension 30 milliLiter(s) Oral every 4 hours PRN Dyspepsia  OLANZapine 2.5 milliGRAM(s) Oral every 6 hours PRN agitation  ondansetron Injectable 4 milliGRAM(s) IV Push every 8 hours PRN Nausea and/or Vomiting  
MEDICATIONS  (STANDING):  amLODIPine   Tablet 5 milliGRAM(s) Oral daily  atorvastatin 10 milliGRAM(s) Oral at bedtime  chlorhexidine 2% Cloths 1 Application(s) Topical <User Schedule>  enoxaparin Injectable 40 milliGRAM(s) SubCutaneous every 24 hours  OLANZapine 2.5 milliGRAM(s) Oral once    MEDICATIONS  (PRN):  acetaminophen     Tablet .. 650 milliGRAM(s) Oral every 6 hours PRN Temp greater or equal to 38C (100.4F), Mild Pain (1 - 3)  aluminum hydroxide/magnesium hydroxide/simethicone Suspension 30 milliLiter(s) Oral every 4 hours PRN Dyspepsia  melatonin 3 milliGRAM(s) Oral at bedtime PRN Insomnia  ondansetron Injectable 4 milliGRAM(s) IV Push every 8 hours PRN Nausea and/or Vomiting  
MEDICATIONS  (STANDING):  amLODIPine   Tablet 5 milliGRAM(s) Oral daily  atorvastatin 10 milliGRAM(s) Oral at bedtime  chlorhexidine 2% Cloths 1 Application(s) Topical <User Schedule>  enoxaparin Injectable 40 milliGRAM(s) SubCutaneous every 24 hours  OLANZapine 2.5 milliGRAM(s) Oral once    MEDICATIONS  (PRN):  acetaminophen     Tablet .. 650 milliGRAM(s) Oral every 6 hours PRN Temp greater or equal to 38C (100.4F), Mild Pain (1 - 3)  aluminum hydroxide/magnesium hydroxide/simethicone Suspension 30 milliLiter(s) Oral every 4 hours PRN Dyspepsia  melatonin 3 milliGRAM(s) Oral at bedtime PRN Insomnia  OLANZapine 2.5 milliGRAM(s) Oral every 6 hours PRN agitation  ondansetron Injectable 4 milliGRAM(s) IV Push every 8 hours PRN Nausea and/or Vomiting  
MEDICATIONS  (STANDING):  amLODIPine   Tablet 5 milliGRAM(s) Oral daily  artificial tears (preservative free) Ophthalmic Solution 1 Drop(s) Both EYES four times a day  atorvastatin 10 milliGRAM(s) Oral at bedtime  chlorhexidine 2% Cloths 1 Application(s) Topical <User Schedule>  enoxaparin Injectable 40 milliGRAM(s) SubCutaneous every 24 hours  melatonin 3 milliGRAM(s) Oral at bedtime  OLANZapine 1.25 milliGRAM(s) Oral at bedtime    MEDICATIONS  (PRN):  acetaminophen     Tablet .. 650 milliGRAM(s) Oral every 6 hours PRN Temp greater or equal to 38C (100.4F), Mild Pain (1 - 3)  aluminum hydroxide/magnesium hydroxide/simethicone Suspension 30 milliLiter(s) Oral every 4 hours PRN Dyspepsia  OLANZapine 2.5 milliGRAM(s) Oral every 6 hours PRN agitation  ondansetron Injectable 4 milliGRAM(s) IV Push every 8 hours PRN Nausea and/or Vomiting

## 2025-06-17 NOTE — PHYSICAL THERAPY INITIAL EVALUATION ADULT - GENERAL OBSERVATIONS, REHAB EVAL
Pt received semi-supine, +bed alarm, NAD. HR: 68 beats per minute, pt's daughter present at bedside during evaluation.

## 2025-06-17 NOTE — BH CONSULTATION LIAISON PROGRESS NOTE - NSBHCONSULTFOLLOWAFTERCARE_PSY_A_CORE FT
sedated brain MRI 2021
PATTI [facility] [phone] (M-F 9am-7pm)   PATTI [facility] [phone]

## 2025-06-17 NOTE — BH CONSULTATION LIAISON PROGRESS NOTE - NSBHCHARTREVIEWVS_PSY_A_CORE FT
Vital Signs Last 24 Hrs  T(C): 36.7 (11 Jun 2025 11:35), Max: 36.9 (10 Hayder 2025 17:26)  T(F): 98.1 (11 Jun 2025 11:35), Max: 98.4 (10 Hayder 2025 17:26)  HR: 88 (11 Jun 2025 11:35) (81 - 89)  BP: 154/64 (11 Jun 2025 11:35) (144/73 - 164/73)  BP(mean): --  RR: 20 (11 Jun 2025 11:35) (19 - 20)  SpO2: 98% (11 Jun 2025 11:35) (98% - 100%)    Parameters below as of 11 Jun 2025 11:35  Patient On (Oxygen Delivery Method): room air    
Vital Signs Last 24 Hrs  T(C): 36.8 (12 Jun 2025 09:11), Max: 37.1 (12 Jun 2025 06:30)  T(F): 98.3 (12 Jun 2025 09:11), Max: 98.7 (12 Jun 2025 06:30)  HR: 72 (12 Jun 2025 09:11) (72 - 88)  BP: 170/77 (12 Jun 2025 09:11) (137/60 - 170/77)  BP(mean): --  RR: 18 (12 Jun 2025 09:11) (17 - 20)  SpO2: 100% (12 Jun 2025 09:11) (97% - 100%)    Parameters below as of 12 Jun 2025 06:30  Patient On (Oxygen Delivery Method): room air    
Vital Signs Last 24 Hrs  T(C): 36.6 (17 Jun 2025 09:58), Max: 36.7 (16 Jun 2025 18:45)  T(F): 97.8 (17 Jun 2025 09:58), Max: 98.1 (16 Jun 2025 18:45)  HR: 74 (17 Jun 2025 09:58) (68 - 78)  BP: 176/75 (17 Jun 2025 09:58) (130/64 - 176/75)  BP(mean): --  RR: 18 (17 Jun 2025 09:58) (17 - 18)  SpO2: 98% (17 Jun 2025 09:58) (97% - 98%)    Parameters below as of 17 Jun 2025 05:24  Patient On (Oxygen Delivery Method): room air    
Vital Signs Last 24 Hrs  T(C): 37.4 (13 Jun 2025 10:49), Max: 37.4 (13 Jun 2025 10:49)  T(F): 99.4 (13 Jun 2025 10:49), Max: 99.4 (13 Jun 2025 10:49)  HR: 95 (13 Jun 2025 10:49) (70 - 95)  BP: 144/65 (13 Jun 2025 10:49) (125/62 - 162/70)  BP(mean): --  RR: 18 (13 Jun 2025 10:49) (17 - 18)  SpO2: 98% (13 Jun 2025 10:49) (98% - 100%)    Parameters below as of 13 Jun 2025 10:49  Patient On (Oxygen Delivery Method): room air

## 2025-06-17 NOTE — PROGRESS NOTE ADULT - ASSESSMENT
84M with history of HTN, HLD, and BCC of forehead s/p excision brought in by daughter for hallucinations without confusion, insight intact.       Problem/Plan - 1:  ·  Problem: Visual hallucination.   ·  Plan: No known h/o MCI/dementia, with longstanding "bad dreams" with 2 episodes of visual hallucinations this year, both without confusion and with insight intact, no auditory hallucinations. 1st time was in April in setting of recent surgery/anesthesia likely delirium. This time shortly after waking from dream and in setting of sleep deprivation, potentially delirium vs sign of MCI vs dementia (Lewy body vs Parkinson's)- confusion may be absent and insight preserved in early stages. Theo Bonnet syndrome also in ddx given visual impairment though is a diagnosis of exclusion   - CTH without acute findings   - neuro and psych fu   - MRI reviewed  - psych fu     Problem/Plan - 2:  ·  Problem: HTN (hypertension). ·  Plan: - continue home amlodipine 5mg daily.    Problem/Plan - 3:  ·  Problem: HLD (hyperlipidemia).   ·  Plan: - continue home simvastatin 20mg qhs.    Problem/Plan - 4:·  Problem: Prophylactic measure.   ·  Plan: Diet: DASH    dw daughter at bedside   PT and rehab planning

## 2025-06-17 NOTE — BH CONSULTATION LIAISON PROGRESS NOTE - NSICDXBHPRIMARYDX_PSY_ALL_CORE
Visual hallucinations   R44.1  

## 2025-06-18 LAB
% ALBUMIN: 51.7 % — SIGNIFICANT CHANGE UP
% ALPHA 1: 4.4 % — SIGNIFICANT CHANGE UP
% ALPHA 2: 9.4 % — SIGNIFICANT CHANGE UP
% BETA: 16 % — SIGNIFICANT CHANGE UP
% GAMMA: 18.5 % — SIGNIFICANT CHANGE UP
ACRM BINDING ANTIBODY: <0.07 NMOL/L — SIGNIFICANT CHANGE UP (ref 0–0.24)
ACRM BINDING ANTIBODY: <0.07 NMOL/L — SIGNIFICANT CHANGE UP (ref 0–0.24)
ALBUMIN SERPL ELPH-MCNC: 3.52 G/DL — SIGNIFICANT CHANGE UP (ref 3.3–4.4)
ALBUMIN/GLOB SERPL ELPH: 1.1 RATIO — SIGNIFICANT CHANGE UP
ALPHA1 GLOB SERPL ELPH-MCNC: 0.3 G/DL — SIGNIFICANT CHANGE UP (ref 0.1–0.3)
ALPHA2 GLOB SERPL ELPH-MCNC: 0.6 G/DL — SIGNIFICANT CHANGE UP (ref 0.6–1)
ANION GAP SERPL CALC-SCNC: 9 MMOL/L — SIGNIFICANT CHANGE UP (ref 7–14)
B-GLOBULIN SERPL ELPH-MCNC: 1.09 G/DL — SIGNIFICANT CHANGE UP (ref 0.6–1.1)
BASOPHILS # BLD AUTO: 0.01 K/UL — SIGNIFICANT CHANGE UP (ref 0–0.2)
BASOPHILS NFR BLD AUTO: 0.1 % — SIGNIFICANT CHANGE UP (ref 0–2)
BUN SERPL-MCNC: 21 MG/DL — SIGNIFICANT CHANGE UP (ref 7–23)
CALCIUM SERPL-MCNC: 9.5 MG/DL — SIGNIFICANT CHANGE UP (ref 8.4–10.5)
CHLORIDE SERPL-SCNC: 107 MMOL/L — SIGNIFICANT CHANGE UP (ref 98–107)
CO2 SERPL-SCNC: 26 MMOL/L — SIGNIFICANT CHANGE UP (ref 22–31)
COPPER SERPL-MCNC: 83 UG/DL — SIGNIFICANT CHANGE UP (ref 69–132)
CREAT SERPL-MCNC: 0.83 MG/DL — SIGNIFICANT CHANGE UP (ref 0.5–1.3)
EGFR: 86 ML/MIN/1.73M2 — SIGNIFICANT CHANGE UP
EGFR: 86 ML/MIN/1.73M2 — SIGNIFICANT CHANGE UP
EOSINOPHIL # BLD AUTO: 0.11 K/UL — SIGNIFICANT CHANGE UP (ref 0–0.5)
EOSINOPHIL NFR BLD AUTO: 1.6 % — SIGNIFICANT CHANGE UP (ref 0–6)
GAMMA GLOBULIN: 1.26 G/DL — SIGNIFICANT CHANGE UP (ref 0.7–1.7)
GLUCOSE SERPL-MCNC: 120 MG/DL — HIGH (ref 70–99)
HCT VFR BLD CALC: 42.7 % — SIGNIFICANT CHANGE UP (ref 39–50)
HGB BLD-MCNC: 14.5 G/DL — SIGNIFICANT CHANGE UP (ref 13–17)
IMM GRANULOCYTES # BLD AUTO: 0.03 K/UL — SIGNIFICANT CHANGE UP (ref 0–0.07)
IMM GRANULOCYTES NFR BLD AUTO: 0.4 % — SIGNIFICANT CHANGE UP (ref 0–0.9)
LYMPHOCYTES # BLD AUTO: 1.45 K/UL — SIGNIFICANT CHANGE UP (ref 1–3.3)
LYMPHOCYTES NFR BLD AUTO: 21.7 % — SIGNIFICANT CHANGE UP (ref 13–44)
MAGNESIUM SERPL-MCNC: 2 MG/DL — SIGNIFICANT CHANGE UP (ref 1.6–2.6)
MCHC RBC-ENTMCNC: 27.5 PG — SIGNIFICANT CHANGE UP (ref 27–34)
MCHC RBC-ENTMCNC: 34 G/DL — SIGNIFICANT CHANGE UP (ref 32–36)
MCV RBC AUTO: 80.9 FL — SIGNIFICANT CHANGE UP (ref 80–100)
MONOCYTES # BLD AUTO: 0.41 K/UL — SIGNIFICANT CHANGE UP (ref 0–0.9)
MONOCYTES NFR BLD AUTO: 6.1 % — SIGNIFICANT CHANGE UP (ref 2–14)
NEUTROPHILS # BLD AUTO: 4.66 K/UL — SIGNIFICANT CHANGE UP (ref 1.8–7.4)
NEUTROPHILS NFR BLD AUTO: 70.1 % — SIGNIFICANT CHANGE UP (ref 43–77)
NRBC # BLD AUTO: 0 K/UL — SIGNIFICANT CHANGE UP (ref 0–0)
NRBC # FLD: 0 K/UL — SIGNIFICANT CHANGE UP (ref 0–0)
NRBC BLD AUTO-RTO: 0 /100 WBCS — SIGNIFICANT CHANGE UP (ref 0–0)
PHOSPHATE SERPL-MCNC: 3 MG/DL — SIGNIFICANT CHANGE UP (ref 2.5–4.5)
PLATELET # BLD AUTO: 196 K/UL — SIGNIFICANT CHANGE UP (ref 150–400)
PMV BLD: 9.5 FL — SIGNIFICANT CHANGE UP (ref 7–13)
POTASSIUM SERPL-MCNC: 3.8 MMOL/L — SIGNIFICANT CHANGE UP (ref 3.5–5.3)
POTASSIUM SERPL-SCNC: 3.8 MMOL/L — SIGNIFICANT CHANGE UP (ref 3.5–5.3)
PROT PATTERN SERPL ELPH-IMP: SIGNIFICANT CHANGE UP
PROT SERPL-MCNC: 6.8 G/DL — SIGNIFICANT CHANGE UP (ref 6–8.3)
PYRIDOXAL PHOS SERPL-MCNC: 4.6 UG/L — SIGNIFICANT CHANGE UP (ref 3.4–65.2)
RBC # BLD: 5.28 M/UL — SIGNIFICANT CHANGE UP (ref 4.2–5.8)
RBC # FLD: 12.9 % — SIGNIFICANT CHANGE UP (ref 10.3–14.5)
SODIUM SERPL-SCNC: 142 MMOL/L — SIGNIFICANT CHANGE UP (ref 135–145)
WBC # BLD: 6.67 K/UL — SIGNIFICANT CHANGE UP (ref 3.8–10.5)
WBC # FLD AUTO: 6.67 K/UL — SIGNIFICANT CHANGE UP (ref 3.8–10.5)

## 2025-06-18 RX ADMIN — Medication 1 DROP(S): at 06:54

## 2025-06-18 RX ADMIN — OLANZAPINE 2.5 MILLIGRAM(S): 10 TABLET ORAL at 22:17

## 2025-06-18 RX ADMIN — NYSTATIN 1 APPLICATION(S): 100000 CREAM TOPICAL at 13:58

## 2025-06-18 RX ADMIN — NYSTATIN 1 APPLICATION(S): 100000 CREAM TOPICAL at 22:18

## 2025-06-18 RX ADMIN — Medication 1 APPLICATION(S): at 06:57

## 2025-06-18 RX ADMIN — Medication 1 DROP(S): at 13:59

## 2025-06-18 RX ADMIN — NYSTATIN 1 APPLICATION(S): 100000 CREAM TOPICAL at 06:55

## 2025-06-18 RX ADMIN — ATORVASTATIN CALCIUM 10 MILLIGRAM(S): 80 TABLET, FILM COATED ORAL at 22:18

## 2025-06-18 RX ADMIN — Medication 1 DROP(S): at 18:43

## 2025-06-18 RX ADMIN — ENOXAPARIN SODIUM 40 MILLIGRAM(S): 100 INJECTION SUBCUTANEOUS at 06:55

## 2025-06-18 RX ADMIN — AMLODIPINE BESYLATE 5 MILLIGRAM(S): 10 TABLET ORAL at 06:55

## 2025-06-18 RX ADMIN — Medication 3 MILLIGRAM(S): at 22:18

## 2025-06-18 NOTE — PROGRESS NOTE ADULT - ASSESSMENT
A/P  84M with history of HTN, HLD, and BCC of forehead s/p excision brought in by daughter for hallucinations.     #Visual hallucination.   -CT head with No adverse interval change April 2025  -CXR with No focal consolidation.  -without any signs or symptoms of localizing infections   -MRI brain shows No MRI evidence of acute intracranial pathology. Age appropriate, mild involutional changes. Minimal microvascular type changes in the periventricular white matter.  -neuro following  -ophthalmology f/u for "double vs triple vision" per daughter   -delirium precautions.    #HTN  -continue home amlodipine 5mg daily    #HLD (hyperlipidemia).   -cont statin     dvt ppx         35 minutes spent on total encounter; more than 50% of the visit was spent counseling and/or coordinating care by the attending physician.

## 2025-06-18 NOTE — PROGRESS NOTE ADULT - ASSESSMENT
84M with history of HTN, HLD, and BCC of forehead s/p excision brought in by daughter for hallucinations without confusion, insight intact.       Problem/Plan - 1:  ·  Problem: Visual hallucination.   ·  Plan: No known h/o MCI/dementia, with longstanding "bad dreams" with 2 episodes of visual hallucinations this year, both without confusion and with insight intact, no auditory hallucinations. 1st time was in April in setting of recent surgery/anesthesia likely delirium. This time shortly after waking from dream and in setting of sleep deprivation, potentially delirium vs sign of MCI vs dementia (Lewy body vs Parkinson's)- confusion may be absent and insight preserved in early stages. Theo Bonnet syndrome also in ddx given visual impairment though is a diagnosis of exclusion   - CTH without acute findings   - neuro and psych fu   - MRI reviewed  - psych fu     Problem/Plan - 2:  ·  Problem: HTN (hypertension). ·  Plan: - continue home amlodipine 5mg daily.    Problem/Plan - 3:·  Problem: HLD (hyperlipidemia).   ·  Plan: - continue home simvastatin 20mg qhs.    Problem/Plan - 4:·  Problem: Prophylactic measure.   ·  Plan: Diet: DASH  dw daughter at bedside

## 2025-06-19 LAB
ACHR BLOCK AB SER-ACNC: 20 % — SIGNIFICANT CHANGE UP (ref 0–25)
ACHR BLOCK AB SER-ACNC: 21 % — SIGNIFICANT CHANGE UP (ref 0–25)
ACHR MOD AB SER-ACNC: 6 % — SIGNIFICANT CHANGE UP (ref 0–45)
ACHR MOD AB SER-ACNC: 6 % — SIGNIFICANT CHANGE UP (ref 0–45)
ALBUMIN SERPL ELPH-MCNC: 3.9 G/DL — SIGNIFICANT CHANGE UP (ref 3.3–5)
ALP SERPL-CCNC: 158 U/L — HIGH (ref 40–120)
ALT FLD-CCNC: 44 U/L — HIGH (ref 4–41)
ANION GAP SERPL CALC-SCNC: 14 MMOL/L — SIGNIFICANT CHANGE UP (ref 7–14)
AST SERPL-CCNC: 39 U/L — SIGNIFICANT CHANGE UP (ref 4–40)
BILIRUB SERPL-MCNC: 0.3 MG/DL — SIGNIFICANT CHANGE UP (ref 0.2–1.2)
BUN SERPL-MCNC: 24 MG/DL — HIGH (ref 7–23)
CALCIUM SERPL-MCNC: 9.9 MG/DL — SIGNIFICANT CHANGE UP (ref 8.4–10.5)
CHLORIDE SERPL-SCNC: 107 MMOL/L — SIGNIFICANT CHANGE UP (ref 98–107)
CO2 SERPL-SCNC: 23 MMOL/L — SIGNIFICANT CHANGE UP (ref 22–31)
CREAT SERPL-MCNC: 0.83 MG/DL — SIGNIFICANT CHANGE UP (ref 0.5–1.3)
EGFR: 86 ML/MIN/1.73M2 — SIGNIFICANT CHANGE UP
EGFR: 86 ML/MIN/1.73M2 — SIGNIFICANT CHANGE UP
GLUCOSE SERPL-MCNC: 112 MG/DL — HIGH (ref 70–99)
HCT VFR BLD CALC: 46.4 % — SIGNIFICANT CHANGE UP (ref 39–50)
HGB BLD-MCNC: 15.5 G/DL — SIGNIFICANT CHANGE UP (ref 13–17)
MAGNESIUM SERPL-MCNC: 2.1 MG/DL — SIGNIFICANT CHANGE UP (ref 1.6–2.6)
MCHC RBC-ENTMCNC: 27.4 PG — SIGNIFICANT CHANGE UP (ref 27–34)
MCHC RBC-ENTMCNC: 33.4 G/DL — SIGNIFICANT CHANGE UP (ref 32–36)
MCV RBC AUTO: 82 FL — SIGNIFICANT CHANGE UP (ref 80–100)
NRBC # BLD AUTO: 0 K/UL — SIGNIFICANT CHANGE UP (ref 0–0)
NRBC # FLD: 0 K/UL — SIGNIFICANT CHANGE UP (ref 0–0)
NRBC BLD AUTO-RTO: 0 /100 WBCS — SIGNIFICANT CHANGE UP (ref 0–0)
PHOSPHATE SERPL-MCNC: 2.9 MG/DL — SIGNIFICANT CHANGE UP (ref 2.5–4.5)
PLATELET # BLD AUTO: 237 K/UL — SIGNIFICANT CHANGE UP (ref 150–400)
PMV BLD: 9.7 FL — SIGNIFICANT CHANGE UP (ref 7–13)
POTASSIUM SERPL-MCNC: 3.6 MMOL/L — SIGNIFICANT CHANGE UP (ref 3.5–5.3)
POTASSIUM SERPL-SCNC: 3.6 MMOL/L — SIGNIFICANT CHANGE UP (ref 3.5–5.3)
PROT SERPL-MCNC: 7.1 G/DL — SIGNIFICANT CHANGE UP (ref 6–8.3)
RBC # BLD: 5.66 M/UL — SIGNIFICANT CHANGE UP (ref 4.2–5.8)
RBC # FLD: 12.9 % — SIGNIFICANT CHANGE UP (ref 10.3–14.5)
SODIUM SERPL-SCNC: 144 MMOL/L — SIGNIFICANT CHANGE UP (ref 135–145)
WBC # BLD: 6.67 K/UL — SIGNIFICANT CHANGE UP (ref 3.8–10.5)
WBC # FLD AUTO: 6.67 K/UL — SIGNIFICANT CHANGE UP (ref 3.8–10.5)

## 2025-06-19 RX ADMIN — NYSTATIN 1 APPLICATION(S): 100000 CREAM TOPICAL at 13:31

## 2025-06-19 RX ADMIN — OLANZAPINE 2.5 MILLIGRAM(S): 10 TABLET ORAL at 21:55

## 2025-06-19 RX ADMIN — Medication 1 DROP(S): at 18:24

## 2025-06-19 RX ADMIN — Medication 1 DROP(S): at 13:29

## 2025-06-19 RX ADMIN — Medication 3 MILLIGRAM(S): at 21:55

## 2025-06-19 RX ADMIN — ENOXAPARIN SODIUM 40 MILLIGRAM(S): 100 INJECTION SUBCUTANEOUS at 06:41

## 2025-06-19 RX ADMIN — NYSTATIN 1 APPLICATION(S): 100000 CREAM TOPICAL at 21:55

## 2025-06-19 RX ADMIN — NYSTATIN 1 APPLICATION(S): 100000 CREAM TOPICAL at 06:40

## 2025-06-19 RX ADMIN — Medication 1 APPLICATION(S): at 06:59

## 2025-06-19 RX ADMIN — AMLODIPINE BESYLATE 5 MILLIGRAM(S): 10 TABLET ORAL at 06:41

## 2025-06-19 RX ADMIN — Medication 1 DROP(S): at 06:40

## 2025-06-19 RX ADMIN — ATORVASTATIN CALCIUM 10 MILLIGRAM(S): 80 TABLET, FILM COATED ORAL at 21:55

## 2025-06-20 RX ADMIN — AMLODIPINE BESYLATE 5 MILLIGRAM(S): 10 TABLET ORAL at 05:16

## 2025-06-20 RX ADMIN — Medication 1 DROP(S): at 05:16

## 2025-06-20 RX ADMIN — ATORVASTATIN CALCIUM 10 MILLIGRAM(S): 80 TABLET, FILM COATED ORAL at 21:01

## 2025-06-20 RX ADMIN — Medication 1 DROP(S): at 18:04

## 2025-06-20 RX ADMIN — NYSTATIN 1 APPLICATION(S): 100000 CREAM TOPICAL at 05:15

## 2025-06-20 RX ADMIN — Medication 3 MILLIGRAM(S): at 21:01

## 2025-06-20 RX ADMIN — OLANZAPINE 2.5 MILLIGRAM(S): 10 TABLET ORAL at 21:01

## 2025-06-20 RX ADMIN — ENOXAPARIN SODIUM 40 MILLIGRAM(S): 100 INJECTION SUBCUTANEOUS at 05:16

## 2025-06-20 RX ADMIN — Medication 1 DROP(S): at 11:21

## 2025-06-20 RX ADMIN — NYSTATIN 1 APPLICATION(S): 100000 CREAM TOPICAL at 13:24

## 2025-06-20 RX ADMIN — Medication 1 DROP(S): at 00:26

## 2025-06-20 RX ADMIN — Medication 1 APPLICATION(S): at 05:16

## 2025-06-20 RX ADMIN — NYSTATIN 1 APPLICATION(S): 100000 CREAM TOPICAL at 21:01

## 2025-06-20 NOTE — CONSULT NOTE ADULT - ASSESSMENT
84 M with history of HTN, HLD, and BCC of forehead s/p excision brought in by daughter for hallucinations.    hallucinations  afebrile, no leukocytosis  UA negative and pt denies dysuria, frequency or urgency  urine culture negative  no evidence of SSTI on exam  CXR without focal consolidation  CT head unrevealing  MRI- No MRI evidence of acute intracranial pathology  treponema pallidum Ab negative  UDS negative    Recommendations  continue off antibiotics  can check HIV Ag/Ab  f/u neurology recs    Dr. John Boyle will be covering 6/21 & 6/22. I will resume care on 6/23   Agustin Granados M.D.  [facility]  [phone]  After 5pm on weekdays and all day on weekends - please call [phone]  Available on [employer] teams    Thank you for consulting us and involving us in the management of this patients case. In addition to reviewing history, imaging, documents, labs, microbiology, took into account antibiotic stewardship, local antibiogram and infection control strategies and potential transmission issues at time of treatment decision making process.

## 2025-06-20 NOTE — PROGRESS NOTE ADULT - ASSESSMENT
84M with history of HTN, HLD, and BCC of forehead s/p excision brought in by daughter for hallucinations without confusion, insight intact.       Problem/Plan - 1:  ·  Problem: Visual hallucination.   ·  Plan: No known h/o MCI/dementia, with longstanding "bad dreams" with 2 episodes of visual hallucinations this year, both without confusion and with insight intact, no auditory hallucinations. 1st time was in April in setting of recent surgery/anesthesia likely delirium. This time shortly after waking from dream and in setting of sleep deprivation, potentially delirium vs sign of MCI vs dementia (Lewy body vs Parkinson's)- confusion may be absent and insight preserved in early stages. Theo Bonnet syndrome also in ddx given visual impairment though is a diagnosis of exclusion   - CTH without acute findings - neuro and psych fu   - MRI reviewed  - psych fu   - EEG pending     Problem/Plan - 2:  ·  Problem: HTN (hypertension). ·  Plan: - continue home amlodipine 5mg daily.    Problem/Plan - 3:·  Problem: HLD (hyperlipidemia).   ·  Plan: - continue home simvastatin 20mg qhs.    Problem/Plan - 4:·  Problem: Prophylactic measure.   ·  Plan: Diet: DASH  dw daughter at bedside

## 2025-06-20 NOTE — CONSULT NOTE ADULT - SUBJECTIVE AND OBJECTIVE BOX
[facility]  SHELLY Alvarado S. Shah, Y. Patel, G. Casimir  [phone]  ([phone] - weekdays after 5pm and weekends)    MILO CARDONA  84y, Male  [id number]    HPI--  HPI:  84 M with history of HTN, HLD, and BCC of forehead s/p excision brought in by daughter for hallucinations. Per notes pt was seeing people in his room that he knows aren't there (friends and family from his earlier years back in [place]). Pt had a similar experience of visual hallucinations back in April, for which he went to [facility] ED for, also with insight intact, but this was after he underwent excision of his BCC with anesthesia. Both this time and last, pt was sleep deprived. Pt without any known history of dementia. He lives alone independently with daughters 10 min away. Labs unrevealing. CTH without acute pathology.   Patient unable to provide history therefore history obtained from chart review.     ROS: 14 point review of systems completed, pertinent positives and negatives as per HPI.    Allergies: No Known Allergies    PMH -- HTN (hypertension)    Hyperlipidemia    [place] (hard of hearing)      PSH -- No significant past surgical history    History of wisdom tooth extraction      FH -- No pertinent family history in first degree relatives    No pertinent family history in first degree relatives      Social History -- denies tobacco use    Physical Exam--  Vital Signs Last 24 Hrs  T(F): 97.8 (20 Jun 2025 09:59), Max: 98.8 (19 Jun 2025 17:18)  HR: 70 (20 Jun 2025 09:59) (70 - 89)  BP: 149/64 (20 Jun 2025 09:59) (134/60 - 152/77)  RR: 17 (20 Jun 2025 09:59) (17 - 19)  SpO2: 96% (20 Jun 2025 09:59) (96% - 98%)  General: nontoxic-appearing, no acute distress  HEENT: anicteric  Lungs: Clear bilaterally without rales  Heart: S1, S2, normal rate  Abdomen: Soft. Nondistended. Nontender.   Neuro: no obvious focal deficits   Back: No costovertebral angle tenderness.  Extremities: No LE edema.   Skin: Warm. Dry.   Psychiatric: flat affect    Laboratory & Imaging Data--  CBC:                       15.5   6.67  )-----------( 237      ( 19 Jun 2025 06:26 )             46.4     WBC Count: 6.67 K/uL (06-19-25 @ 06:26)  WBC Count: 6.67 K/uL (06-18-25 @ 06:32)  WBC Count: 6.98 K/uL (06-17-25 @ 10:00)  WBC Count: 5.87 K/uL (06-16-25 @ 05:55)    CMP: 06-19    144  |  107  |  24[H]  ----------------------------<  112[H]  3.6   |  23  |  0.83    Ca    9.9      19 Jun 2025 06:26  Phos  2.9     06-19  Mg     2.10     06-19    TPro  7.1  /  Alb  3.9  /  TBili  0.3  /  DBili  x   /  AST  39  /  ALT  44[H]  /  AlkPhos  158[H]  06-19    LIVER FUNCTIONS - ( 19 Jun 2025 06:26 )  Alb: 3.9 g/dL / Pro: 7.1 g/dL / ALK PHOS: 158 U/L / ALT: 44 U/L / AST: 39 U/L / GGT: x           Urinalysis Basic - ( 19 Jun 2025 06:26 )    Color: x / Appearance: x / SG: x / pH: x  Gluc: 112 mg/dL / Ketone: x  / Bili: x / Urobili: x   Blood: x / Protein: x / Nitrite: x   Leuk Esterase: x / RBC: x / WBC x   Sq Epi: x / Non Sq Epi: x / Bacteria: x      Microbiology:     Culture - Urine (collected 06-09-25 @ 18:15)  Source: Clean Catch Clean Catch (Midstream)  Final Report (06-10-25 @ 23:11):    <10,000 CFU/mL Normal Urogenital Kristen        Radiology--  ***  Active Medications--  acetaminophen     Tablet .. 650 milliGRAM(s) Oral every 6 hours PRN  aluminum hydroxide/magnesium hydroxide/simethicone Suspension 30 milliLiter(s) Oral every 4 hours PRN  amLODIPine   Tablet 5 milliGRAM(s) Oral daily  artificial tears (preservative free) Ophthalmic Solution 1 Drop(s) Both EYES four times a day  atorvastatin 10 milliGRAM(s) Oral at bedtime  chlorhexidine 2% Cloths 1 Application(s) Topical <User Schedule>  enoxaparin Injectable 40 milliGRAM(s) SubCutaneous every 24 hours  melatonin 3 milliGRAM(s) Oral at bedtime  nystatin Powder 1 Application(s) Topical three times a day  OLANZapine 2.5 milliGRAM(s) Oral at bedtime  OLANZapine 2.5 milliGRAM(s) Oral every 6 hours PRN  ondansetron Injectable 4 milliGRAM(s) IV Push every 8 hours PRN    Antimicrobials:     Immunologic:

## 2025-06-20 NOTE — PROGRESS NOTE ADULT - ASSESSMENT
A/P  84M with history of HTN, HLD, and BCC of forehead s/p excision brought in by daughter for hallucinations.     #Visual hallucination.   -CT head with No adverse interval change April 2025  -CXR with No focal consolidation.  -without any signs or symptoms of localizing infections   -MRI brain shows No MRI evidence of acute intracranial pathology. Age appropriate, mild involutional changes. Minimal microvascular type changes in the periventricular white matter.  -neuro f/u   -psych f/u   -ophthalmology f/u for "double vs triple vision" per daughter   -delirium precautions.    #HTN  -continue home amlodipine 5mg daily    #HLD (hyperlipidemia).   -cont statin     dvt ppx

## 2025-06-20 NOTE — CONSULT NOTE ADULT - REASON FOR ADMISSION
hallucinations, unsafe disposition

## 2025-06-21 LAB
ALBUMIN SERPL ELPH-MCNC: 3.5 G/DL — SIGNIFICANT CHANGE UP (ref 3.3–5)
ALP SERPL-CCNC: 147 U/L — HIGH (ref 40–120)
ALT FLD-CCNC: 41 U/L — SIGNIFICANT CHANGE UP (ref 4–41)
ANION GAP SERPL CALC-SCNC: 13 MMOL/L — SIGNIFICANT CHANGE UP (ref 7–14)
AST SERPL-CCNC: 37 U/L — SIGNIFICANT CHANGE UP (ref 4–40)
BILIRUB SERPL-MCNC: 0.4 MG/DL — SIGNIFICANT CHANGE UP (ref 0.2–1.2)
BUN SERPL-MCNC: 16 MG/DL — SIGNIFICANT CHANGE UP (ref 7–23)
CALCIUM SERPL-MCNC: 9.4 MG/DL — SIGNIFICANT CHANGE UP (ref 8.4–10.5)
CHLORIDE SERPL-SCNC: 105 MMOL/L — SIGNIFICANT CHANGE UP (ref 98–107)
CO2 SERPL-SCNC: 23 MMOL/L — SIGNIFICANT CHANGE UP (ref 22–31)
CREAT SERPL-MCNC: 0.8 MG/DL — SIGNIFICANT CHANGE UP (ref 0.5–1.3)
EGFR: 87 ML/MIN/1.73M2 — SIGNIFICANT CHANGE UP
EGFR: 87 ML/MIN/1.73M2 — SIGNIFICANT CHANGE UP
GLUCOSE SERPL-MCNC: 126 MG/DL — HIGH (ref 70–99)
HCT VFR BLD CALC: 47.4 % — SIGNIFICANT CHANGE UP (ref 39–50)
HGB BLD-MCNC: 15.8 G/DL — SIGNIFICANT CHANGE UP (ref 13–17)
HIV 1+2 AB+HIV1 P24 AG SERPL QL IA: SIGNIFICANT CHANGE UP
MCHC RBC-ENTMCNC: 27.1 PG — SIGNIFICANT CHANGE UP (ref 27–34)
MCHC RBC-ENTMCNC: 33.3 G/DL — SIGNIFICANT CHANGE UP (ref 32–36)
MCV RBC AUTO: 81.4 FL — SIGNIFICANT CHANGE UP (ref 80–100)
NRBC # BLD AUTO: 0 K/UL — SIGNIFICANT CHANGE UP (ref 0–0)
NRBC # FLD: 0 K/UL — SIGNIFICANT CHANGE UP (ref 0–0)
NRBC BLD AUTO-RTO: 0 /100 WBCS — SIGNIFICANT CHANGE UP (ref 0–0)
PLATELET # BLD AUTO: 256 K/UL — SIGNIFICANT CHANGE UP (ref 150–400)
PMV BLD: 9.6 FL — SIGNIFICANT CHANGE UP (ref 7–13)
POTASSIUM SERPL-MCNC: 4 MMOL/L — SIGNIFICANT CHANGE UP (ref 3.5–5.3)
POTASSIUM SERPL-SCNC: 4 MMOL/L — SIGNIFICANT CHANGE UP (ref 3.5–5.3)
PROT SERPL-MCNC: 6.6 G/DL — SIGNIFICANT CHANGE UP (ref 6–8.3)
RBC # BLD: 5.82 M/UL — HIGH (ref 4.2–5.8)
RBC # FLD: 13 % — SIGNIFICANT CHANGE UP (ref 10.3–14.5)
SODIUM SERPL-SCNC: 141 MMOL/L — SIGNIFICANT CHANGE UP (ref 135–145)
WBC # BLD: 6.79 K/UL — SIGNIFICANT CHANGE UP (ref 3.8–10.5)
WBC # FLD AUTO: 6.79 K/UL — SIGNIFICANT CHANGE UP (ref 3.8–10.5)

## 2025-06-21 RX ADMIN — OLANZAPINE 2.5 MILLIGRAM(S): 10 TABLET ORAL at 23:09

## 2025-06-21 RX ADMIN — Medication 1 APPLICATION(S): at 05:09

## 2025-06-21 RX ADMIN — NYSTATIN 1 APPLICATION(S): 100000 CREAM TOPICAL at 13:26

## 2025-06-21 RX ADMIN — ATORVASTATIN CALCIUM 10 MILLIGRAM(S): 80 TABLET, FILM COATED ORAL at 23:09

## 2025-06-21 RX ADMIN — Medication 1 DROP(S): at 01:02

## 2025-06-21 RX ADMIN — Medication 1 DROP(S): at 23:10

## 2025-06-21 RX ADMIN — Medication 1 DROP(S): at 05:09

## 2025-06-21 RX ADMIN — ENOXAPARIN SODIUM 40 MILLIGRAM(S): 100 INJECTION SUBCUTANEOUS at 05:08

## 2025-06-21 RX ADMIN — Medication 1 DROP(S): at 11:48

## 2025-06-21 RX ADMIN — Medication 1 DROP(S): at 17:47

## 2025-06-21 RX ADMIN — NYSTATIN 1 APPLICATION(S): 100000 CREAM TOPICAL at 23:09

## 2025-06-21 RX ADMIN — NYSTATIN 1 APPLICATION(S): 100000 CREAM TOPICAL at 05:09

## 2025-06-21 RX ADMIN — Medication 3 MILLIGRAM(S): at 23:10

## 2025-06-21 NOTE — PROGRESS NOTE ADULT - ASSESSMENT
84 M with history of HTN, HLD, and BCC of forehead s/p excision brought in by daughter for hallucinations.    hallucinations  afebrile, no leukocytosis  UA negative and pt denies dysuria, frequency or urgency  urine culture negative  no evidence of SSTI on exam  CXR without focal consolidation  CT head unrevealing  MRI- No MRI evidence of acute intracranial pathology  treponema pallidum Ab negative  UDS negative  HIV screen negative   no infectious source found to date     Recommendations  Continue off antibiotics  Continue rest of care per primary team     Stable from ID standpoint at this time.  Please call if any questions, thank you    John Boyle M.D.  [facility]  Available on [employer] TEAMS - *PREFERRED*  [phone]  After 5pm on weekdays and all day on weekends - please call [phone]     Thank you for consulting us and involving us in the management of this patients case. In addition to reviewing history, imaging, documents, labs, microbiology, took into account antibiotic stewardship, local antibiogram and infection control strategies and potential transmission issues at time of treatment decision making process.

## 2025-06-21 NOTE — PROGRESS NOTE ADULT - ASSESSMENT
A/P  84M with history of HTN, HLD, and BCC of forehead s/p excision brought in by daughter for hallucinations.     #Visual hallucination.   -CT head with No adverse interval change April 2025  -CXR with No focal consolidation.  -without any signs or symptoms of localizing infections   -MRI brain shows No MRI evidence of acute intracranial pathology. Age appropriate, mild involutional changes. Minimal microvascular type changes in the periventricular white matter.  -neuro f/u   -psych f/u   -ophthalmology f/u for "double vs triple vision" per daughter   -delirium precautions.    #HTN  -continue home amlodipine 5mg daily  -Can increase to 10mg if bp remains elevated     #HLD (hyperlipidemia).   -cont statin     dvt ppx

## 2025-06-22 LAB
ALBUMIN SERPL ELPH-MCNC: 3.5 G/DL — SIGNIFICANT CHANGE UP (ref 3.3–5)
ALP SERPL-CCNC: 131 U/L — HIGH (ref 40–120)
ALT FLD-CCNC: 32 U/L — SIGNIFICANT CHANGE UP (ref 4–41)
ANION GAP SERPL CALC-SCNC: 11 MMOL/L — SIGNIFICANT CHANGE UP (ref 7–14)
AST SERPL-CCNC: 25 U/L — SIGNIFICANT CHANGE UP (ref 4–40)
BILIRUB SERPL-MCNC: 0.5 MG/DL — SIGNIFICANT CHANGE UP (ref 0.2–1.2)
BUN SERPL-MCNC: 17 MG/DL — SIGNIFICANT CHANGE UP (ref 7–23)
CALCIUM SERPL-MCNC: 9.2 MG/DL — SIGNIFICANT CHANGE UP (ref 8.4–10.5)
CHLORIDE SERPL-SCNC: 108 MMOL/L — HIGH (ref 98–107)
CO2 SERPL-SCNC: 23 MMOL/L — SIGNIFICANT CHANGE UP (ref 22–31)
CREAT SERPL-MCNC: 0.85 MG/DL — SIGNIFICANT CHANGE UP (ref 0.5–1.3)
EGFR: 86 ML/MIN/1.73M2 — SIGNIFICANT CHANGE UP
EGFR: 86 ML/MIN/1.73M2 — SIGNIFICANT CHANGE UP
GLUCOSE SERPL-MCNC: 103 MG/DL — HIGH (ref 70–99)
HCT VFR BLD CALC: 45.8 % — SIGNIFICANT CHANGE UP (ref 39–50)
HGB BLD-MCNC: 14.8 G/DL — SIGNIFICANT CHANGE UP (ref 13–17)
MCHC RBC-ENTMCNC: 26.3 PG — LOW (ref 27–34)
MCHC RBC-ENTMCNC: 32.3 G/DL — SIGNIFICANT CHANGE UP (ref 32–36)
MCV RBC AUTO: 81.5 FL — SIGNIFICANT CHANGE UP (ref 80–100)
NRBC # BLD AUTO: 0 K/UL — SIGNIFICANT CHANGE UP (ref 0–0)
NRBC # FLD: 0 K/UL — SIGNIFICANT CHANGE UP (ref 0–0)
NRBC BLD AUTO-RTO: 0 /100 WBCS — SIGNIFICANT CHANGE UP (ref 0–0)
PLATELET # BLD AUTO: 246 K/UL — SIGNIFICANT CHANGE UP (ref 150–400)
PMV BLD: 9 FL — SIGNIFICANT CHANGE UP (ref 7–13)
POTASSIUM SERPL-MCNC: 3.6 MMOL/L — SIGNIFICANT CHANGE UP (ref 3.5–5.3)
POTASSIUM SERPL-SCNC: 3.6 MMOL/L — SIGNIFICANT CHANGE UP (ref 3.5–5.3)
PROT SERPL-MCNC: 6.4 G/DL — SIGNIFICANT CHANGE UP (ref 6–8.3)
RBC # BLD: 5.62 M/UL — SIGNIFICANT CHANGE UP (ref 4.2–5.8)
RBC # FLD: 13 % — SIGNIFICANT CHANGE UP (ref 10.3–14.5)
SODIUM SERPL-SCNC: 142 MMOL/L — SIGNIFICANT CHANGE UP (ref 135–145)
WBC # BLD: 6.47 K/UL — SIGNIFICANT CHANGE UP (ref 3.8–10.5)
WBC # FLD AUTO: 6.47 K/UL — SIGNIFICANT CHANGE UP (ref 3.8–10.5)

## 2025-06-22 PROCEDURE — 95816 EEG AWAKE AND DROWSY: CPT | Mod: 26

## 2025-06-22 RX ADMIN — Medication 1 APPLICATION(S): at 05:10

## 2025-06-22 RX ADMIN — Medication 1 DROP(S): at 05:10

## 2025-06-22 RX ADMIN — Medication 1 DROP(S): at 12:32

## 2025-06-22 RX ADMIN — ATORVASTATIN CALCIUM 10 MILLIGRAM(S): 80 TABLET, FILM COATED ORAL at 22:51

## 2025-06-22 RX ADMIN — OLANZAPINE 2.5 MILLIGRAM(S): 10 TABLET ORAL at 22:51

## 2025-06-22 RX ADMIN — Medication 3 MILLIGRAM(S): at 22:51

## 2025-06-22 RX ADMIN — NYSTATIN 1 APPLICATION(S): 100000 CREAM TOPICAL at 22:50

## 2025-06-22 RX ADMIN — Medication 1 DROP(S): at 17:28

## 2025-06-22 RX ADMIN — NYSTATIN 1 APPLICATION(S): 100000 CREAM TOPICAL at 13:47

## 2025-06-22 RX ADMIN — NYSTATIN 1 APPLICATION(S): 100000 CREAM TOPICAL at 05:10

## 2025-06-22 RX ADMIN — Medication 1 DROP(S): at 22:50

## 2025-06-22 RX ADMIN — ENOXAPARIN SODIUM 40 MILLIGRAM(S): 100 INJECTION SUBCUTANEOUS at 05:10

## 2025-06-22 RX ADMIN — AMLODIPINE BESYLATE 5 MILLIGRAM(S): 10 TABLET ORAL at 05:10

## 2025-06-22 NOTE — PROGRESS NOTE ADULT - ASSESSMENT
A/P  84M with history of HTN, HLD, and BCC of forehead s/p excision brought in by daughter for hallucinations.     #Visual hallucination.   -CT head with No adverse interval change April 2025  -CXR with No focal consolidation.  -without any signs or symptoms of localizing infections   -MRI brain shows No MRI evidence of acute intracranial pathology. Age appropriate, mild involutional changes. Minimal microvascular type changes in the periventricular white matter.  -neuro f/u   -psych f/u   -ophthalmology f/u for "double vs triple vision" per daughter   -delirium precautions  -pending eeg     #HTN  -amlodipine increased to 10mg with better bp control, continue    #HLD (hyperlipidemia).   -cont statin       dvt ppx

## 2025-06-22 NOTE — PROGRESS NOTE ADULT - ASSESSMENT
84M with history of HTN, HLD, and BCC of forehead s/p excision brought in by daughter for hallucinations without confusion, insight intact.       Problem/Plan - 1:  ·  Problem: Visual hallucination.   ·  Plan: No known h/o MCI/dementia, with longstanding "bad dreams" with 2 episodes of visual hallucinations this year, both without confusion and with insight intact, no auditory hallucinations. 1st time was in April in setting of recent surgery/anesthesia likely delirium. This time shortly after waking from dream and in setting of sleep deprivation, potentially delirium vs sign of MCI vs dementia (Lewy body vs Parkinson's)- confusion may be absent and insight preserved in early stages. Theo Bonnet syndrome also in ddx given visual impairment though is a diagnosis of exclusion   - CTH without acute findings - neuro and psych fu   - MRI reviewed  - psych fu   - EEG pending     Problem/Plan - 2:  ·  Problem: HTN (hypertension). ·  Plan: - continue home amlodipine 5mg daily.    Problem/Plan - 3:·  Problem: HLD (hyperlipidemia).   ·  Plan: - continue home simvastatin 20mg qhs.    dw daughter at bedside

## 2025-06-22 NOTE — EEG REPORT - NS EEG TEXT BOX
Patient Name: MILO CARDONA    Age: 84 year  : 1940  Location: -  Referring Physician: BERNARDINO BALDERRAMA    EEG #: [id number]  Study Date: 2025   Start Time: 2:22:25 PM     Study Duration: 25.0 minutes    ------------------------------------------------------------------  Technical Information:					  On Instrument: [id number]  Placement and Labeling of Electrodes:  The EEG was performed utilizing 20 channels referential EEG connections (coronal over temporal over parasagittal montage) using all standard 10-20 electrode placements with EKG.  Recording was at a sampling rate of 256 samples per second per channel.  Time synchronized digital video recording was done simultaneously with EEG recording.  A low light infrared camera was used for low light recording.  Michel and seizure detection algorithms were utilized.  ------------------------------------------------------------------  History:  Concern for Seizures      Medication  Aluminum Hydroxide    OLANZAPINE    Ondasetron    Acetaminophen    Astrovastatin    Enoxaparin    Chlorhexidine    Melatonin    AMLODIPINE    ------------------------------------------------------------------  Study Interpretation:    FINDINGS:    Background:  The background was continuous, spontaneously variable and reactive.  During wakefulness, the posteriorly dominant rhythm consisted of symmetric, well modulated 9 Hz activity, with an amplitude to 30 uV, that attenuated to eye opening.  Low amplitude central beta was noted in wakefulness.    GENERALIZED SLOWING: none was present.  FOCAL SLOWING: none was present.    Sleep Background:  Drowsiness was characterized by fragmentation, attenuation, and slowing of the background activity.    N2 sleep transients with symmetric spindles and K-complexes.    Non-epileptiform activity:  None.    Epileptiform Activity:   No epileptiform discharges were present.    Events:  No clinical events were recorded.  No seizures were recorded.    Activation Procedures:   Hyperventilation was not performed.    Photic stimulation was not performed.    Artifacts:  Intermittent myogenic and movement artifacts were noted.  ------------------------------------------------------------------  EEG CLASSIFICATION:  Normal EEG in the awake, drowsy and asleep states.    ------------------------------------------------------------------  CLINICAL IMPRESSION:  Normal EEG.  No epileptiform pattern or seizure recorded.     ------------------------------------------------------------------  Sebastian Rivera M.D.			    Attending Physician, [facility] [place]

## 2025-06-23 LAB
ANION GAP SERPL CALC-SCNC: 14 MMOL/L — SIGNIFICANT CHANGE UP (ref 7–14)
BUN SERPL-MCNC: 20 MG/DL — SIGNIFICANT CHANGE UP (ref 7–23)
CALCIUM SERPL-MCNC: 9 MG/DL — SIGNIFICANT CHANGE UP (ref 8.4–10.5)
CHLORIDE SERPL-SCNC: 105 MMOL/L — SIGNIFICANT CHANGE UP (ref 98–107)
CO2 SERPL-SCNC: 24 MMOL/L — SIGNIFICANT CHANGE UP (ref 22–31)
CREAT SERPL-MCNC: 0.8 MG/DL — SIGNIFICANT CHANGE UP (ref 0.5–1.3)
EGFR: 87 ML/MIN/1.73M2 — SIGNIFICANT CHANGE UP
EGFR: 87 ML/MIN/1.73M2 — SIGNIFICANT CHANGE UP
GLUCOSE SERPL-MCNC: 92 MG/DL — SIGNIFICANT CHANGE UP (ref 70–99)
HCT VFR BLD CALC: 45.3 % — SIGNIFICANT CHANGE UP (ref 39–50)
HGB BLD-MCNC: 15.1 G/DL — SIGNIFICANT CHANGE UP (ref 13–17)
MAGNESIUM SERPL-MCNC: 2.2 MG/DL — SIGNIFICANT CHANGE UP (ref 1.6–2.6)
MCHC RBC-ENTMCNC: 26.9 PG — LOW (ref 27–34)
MCHC RBC-ENTMCNC: 33.3 G/DL — SIGNIFICANT CHANGE UP (ref 32–36)
MCV RBC AUTO: 80.7 FL — SIGNIFICANT CHANGE UP (ref 80–100)
METHYLMALONATE SERPL-SCNC: 202 NMOL/L — SIGNIFICANT CHANGE UP (ref 0–378)
NRBC # BLD AUTO: 0 K/UL — SIGNIFICANT CHANGE UP (ref 0–0)
NRBC # FLD: 0 K/UL — SIGNIFICANT CHANGE UP (ref 0–0)
NRBC BLD AUTO-RTO: 0 /100 WBCS — SIGNIFICANT CHANGE UP (ref 0–0)
PHOSPHATE SERPL-MCNC: 2.8 MG/DL — SIGNIFICANT CHANGE UP (ref 2.5–4.5)
PLATELET # BLD AUTO: 251 K/UL — SIGNIFICANT CHANGE UP (ref 150–400)
PMV BLD: 9.3 FL — SIGNIFICANT CHANGE UP (ref 7–13)
POTASSIUM SERPL-MCNC: 3.6 MMOL/L — SIGNIFICANT CHANGE UP (ref 3.5–5.3)
POTASSIUM SERPL-SCNC: 3.6 MMOL/L — SIGNIFICANT CHANGE UP (ref 3.5–5.3)
RBC # BLD: 5.61 M/UL — SIGNIFICANT CHANGE UP (ref 4.2–5.8)
RBC # FLD: 13.1 % — SIGNIFICANT CHANGE UP (ref 10.3–14.5)
SODIUM SERPL-SCNC: 143 MMOL/L — SIGNIFICANT CHANGE UP (ref 135–145)
WBC # BLD: 7.6 K/UL — SIGNIFICANT CHANGE UP (ref 3.8–10.5)
WBC # FLD AUTO: 7.6 K/UL — SIGNIFICANT CHANGE UP (ref 3.8–10.5)

## 2025-06-23 RX ORDER — SALINE 7; 19 G/118ML; G/118ML
1 ENEMA RECTAL ONCE
Refills: 0 | Status: COMPLETED | OUTPATIENT
Start: 2025-06-23 | End: 2025-06-23

## 2025-06-23 RX ADMIN — Medication 3 MILLIGRAM(S): at 22:36

## 2025-06-23 RX ADMIN — ATORVASTATIN CALCIUM 10 MILLIGRAM(S): 80 TABLET, FILM COATED ORAL at 22:36

## 2025-06-23 RX ADMIN — Medication 1 DROP(S): at 17:25

## 2025-06-23 RX ADMIN — Medication 1 DROP(S): at 23:06

## 2025-06-23 RX ADMIN — NYSTATIN 1 APPLICATION(S): 100000 CREAM TOPICAL at 22:36

## 2025-06-23 RX ADMIN — SALINE 1 ENEMA: 7; 19 ENEMA RECTAL at 17:22

## 2025-06-23 RX ADMIN — AMLODIPINE BESYLATE 5 MILLIGRAM(S): 10 TABLET ORAL at 07:06

## 2025-06-23 RX ADMIN — Medication 1 APPLICATION(S): at 07:07

## 2025-06-23 RX ADMIN — OLANZAPINE 2.5 MILLIGRAM(S): 10 TABLET ORAL at 22:36

## 2025-06-23 RX ADMIN — ENOXAPARIN SODIUM 40 MILLIGRAM(S): 100 INJECTION SUBCUTANEOUS at 07:06

## 2025-06-23 RX ADMIN — Medication 1 DROP(S): at 12:16

## 2025-06-23 RX ADMIN — NYSTATIN 1 APPLICATION(S): 100000 CREAM TOPICAL at 07:07

## 2025-06-23 RX ADMIN — NYSTATIN 1 APPLICATION(S): 100000 CREAM TOPICAL at 12:17

## 2025-06-23 RX ADMIN — Medication 1 DROP(S): at 07:06

## 2025-06-23 NOTE — PROGRESS NOTE ADULT - ASSESSMENT
84M with history of HTN, HLD, and BCC of forehead s/p excision brought in by daughter for hallucinations without confusion, insight intact.       Problem/Plan - 1:  ·  Problem: Visual hallucination.   ·  Plan: No known h/o MCI/dementia, with longstanding "bad dreams" with 2 episodes of visual hallucinations this year, both without confusion and with insight intact, no auditory hallucinations. 1st time was in April in setting of recent surgery/anesthesia likely delirium. This time shortly after waking from dream and in setting of sleep deprivation, potentially delirium vs sign of MCI vs dementia (Lewy body vs Parkinson's)- confusion may be absent and insight preserved in early stages. Theo Bonnet syndrome also in ddx given visual impairment though is a diagnosis of exclusion   - CTH without acute findings - neuro and psych fu   - MRI reviewed  - psych fu       Problem/Plan - 2:  ·  Problem: HTN (hypertension). ·  Plan: - continue home amlodipine 5mg daily.    Problem/Plan - 3:·  Problem: HLD (hyperlipidemia).   ·  Plan: - continue home simvastatin 20mg qhs.    dc planning to rehab

## 2025-06-23 NOTE — CHART NOTE - NSCHARTNOTEFT_GEN_A_CORE
NUTRITION FOLLOW UP NOTE     REASON FOR ASSESSMENT: Standard follow up     SOURCE:    [X] Medical record [X] RN/PCA [X] Patient [X] Patient's daughter at bedside     MEDICAL COURSE: Per chart, patient is a "84M with history of HTN, hyperlipidemia, and BCC of forehead s/p excision brought in by daughter for hallucinations without confusion, insight intact."    DIET PRESCRIPTION:  Diet, DASH/TLC:   Sodium & Cholesterol Restricted  Easy to Chew (EASYTOCHEW)  Supplement Feeding Modality:  Oral  Ensure Plus High Protein Cans or Servings Per Day:  1       Frequency:  Daily (06-16-25 @ 15:27)    NUTRITION COURSE:  Visited patient at bedside today. Daughter also available at bedside. Reporting appetite remains stable in house, consuming ~60% of meals provided in house. Consuming Ensure Plus High Protein as well. Open to trying other supplements. Denied nausea, vomiting. Reporting some constipation in house. Last BM documented 6/19.     PERTINENT MEDICATIONS:  MEDICATIONS  (STANDING):  amLODIPine   Tablet 5 milliGRAM(s) Oral daily  artificial tears (preservative free) Ophthalmic Solution 1 Drop(s) Both EYES four times a day  atorvastatin 10 milliGRAM(s) Oral at bedtime  chlorhexidine 2% Cloths 1 Application(s) Topical <User Schedule>  enoxaparin Injectable 40 milliGRAM(s) SubCutaneous every 24 hours  melatonin 3 milliGRAM(s) Oral at bedtime  nystatin Powder 1 Application(s) Topical three times a day  OLANZapine 2.5 milliGRAM(s) Oral at bedtime    MEDICATIONS  (PRN):  acetaminophen     Tablet .. 650 milliGRAM(s) Oral every 6 hours PRN Temp greater or equal to 38C (100.4F), Mild Pain (1 - 3)  aluminum hydroxide/magnesium hydroxide/simethicone Suspension 30 milliLiter(s) Oral every 4 hours PRN Dyspepsia  OLANZapine 2.5 milliGRAM(s) Oral every 6 hours PRN agitation  ondansetron Injectable 4 milliGRAM(s) IV Push every 8 hours PRN Nausea and/or Vomiting    PERTINENT LABS:  06-23 Na143 mmol/L Glu 92 mg/dL K+ 3.6 mmol/L Cr  0.80 mg/dL BUN 20 mg/dL 06-23 Phos 2.8 mg/dL 06-22 Alb 3.5 g/dL    ANTHROPOMETRICS:  Height (cm): 167.6 (06-09 @ 13:46), 167.6 (04-18 @ 17:18)  Weight (kg): 72.3kg 6/11, 72.6 (06-09 @ 13:46), 72.9 (04-18 @ 17:18)  BMI (kg/m2): 25.8 (06-09 @ 13:46), 26 (04-18 @ 17:18)  Weight Assessment: no new weights to assess in house - RN team aware.     PHYSICAL ASSESSMENT, per flowsheets:  Edema: 1+ edema to the scrotum documented per flowsheets  Pressure Injury: no pressure injuries documented per flowsheets   Nutrition Focused Physical Exam: [X] conducted - no overt signs of depletion    ESTIMATED NEEDS:  [X] No change since previous assessment, based on dosing weight  160lbs / 72.6kg   1815-2178kcal daily @ 30-35kcal/kg,  1-1.2gm protein daily @ 72.6kg-87.12gm/kg     PREVIOUS NUTRITION DX:  [X] Inadequate Energy Intake  Nutrition Diagnosis is [X] ongoing  New Nutrition Diagnosis: [X] not applicable     EDUCATION:  [X] Provided pt with verbal / written education on oral nutrition supplementation    RECOMMENDATIONS/INTERVENTIONS:  1) Consider liberalizing diet to regular easy to chew diet to promote PO intake  2) Continue provision of Ensure Plus High Protein 1x daily   3)  department to provide Magic Cup 1x/day (290 kcal, 9 gm protein) to promote optimal PO intake   4) Obtain new weight using calibration as able - RN team aware   5) Consider addition of bowel regimen to promote stool regularity    MONITORING & EVALUATING:  PO intake, tolerance to diet/supplement, nutrition related lab values, weight trends, BMs/GI distress, hydration status, skin integrity.    Mary El MS, RD | available on Teams | RD office #: [id number] NUTRITION FOLLOW UP NOTE     REASON FOR ASSESSMENT: Standard follow up     SOURCE:    [X] Medical record [X] RN/PCA [X] Patient [X] Patient's daughter at bedside     MEDICAL COURSE: Per chart, patient is a "84M with history of HTN, hyperlipidemia, and BCC of forehead s/p excision brought in by daughter for hallucinations without confusion, insight intact."    DIET PRESCRIPTION:  Diet, DASH/TLC:   Sodium & Cholesterol Restricted  Easy to Chew (EASYTOCHEW)  Supplement Feeding Modality:  Oral  Ensure Plus High Protein Cans or Servings Per Day:  1       Frequency:  Daily (06-16-25 @ 15:27)    NUTRITION COURSE:  Visited patient at bedside today. Daughter also available at bedside. Reporting appetite remains stable in house, consuming ~60% of meals provided in house. Consuming Ensure Plus High Protein as well. Open to trying other supplements. Denied nausea, vomiting. Reporting some constipation in house. Last BM documented 6/19.     PERTINENT MEDICATIONS:  MEDICATIONS  (STANDING):  amLODIPine   Tablet 5 milliGRAM(s) Oral daily  artificial tears (preservative free) Ophthalmic Solution 1 Drop(s) Both EYES four times a day  atorvastatin 10 milliGRAM(s) Oral at bedtime  chlorhexidine 2% Cloths 1 Application(s) Topical <User Schedule>  enoxaparin Injectable 40 milliGRAM(s) SubCutaneous every 24 hours  melatonin 3 milliGRAM(s) Oral at bedtime  nystatin Powder 1 Application(s) Topical three times a day  OLANZapine 2.5 milliGRAM(s) Oral at bedtime    MEDICATIONS  (PRN):  acetaminophen     Tablet .. 650 milliGRAM(s) Oral every 6 hours PRN Temp greater or equal to 38C (100.4F), Mild Pain (1 - 3)  aluminum hydroxide/magnesium hydroxide/simethicone Suspension 30 milliLiter(s) Oral every 4 hours PRN Dyspepsia  OLANZapine 2.5 milliGRAM(s) Oral every 6 hours PRN agitation  ondansetron Injectable 4 milliGRAM(s) IV Push every 8 hours PRN Nausea and/or Vomiting    PERTINENT LABS:  06-23 Na143 mmol/L Glu 92 mg/dL K+ 3.6 mmol/L Cr  0.80 mg/dL BUN 20 mg/dL 06-23 Phos 2.8 mg/dL 06-22 Alb 3.5 g/dL    ANTHROPOMETRICS:  Height (cm): 167.6 (06-09 @ 13:46), 167.6 (04-18 @ 17:18)  Weight (kg): 72.3kg 6/11, 72.6 (06-09 @ 13:46), 72.9 (04-18 @ 17:18)  BMI (kg/m2): 25.8 (06-09 @ 13:46), 26 (04-18 @ 17:18)  Weight Assessment: no new weights to assess in house - RN team aware.     PHYSICAL ASSESSMENT, per flowsheets:  Edema: 1+ edema to the scrotum documented per flowsheets  Pressure Injury: no pressure injuries documented per flowsheets   Nutrition Focused Physical Exam: [X] conducted - no overt signs of depletion    ESTIMATED NEEDS:  [X] No change since previous assessment, based on dosing weight  160lbs / 72.6kg   1815-2178kcal daily @ 30-35kcal/kg,  1-1.2gm protein daily @ 72.6kg-87.12gm/kg     PREVIOUS NUTRITION DX:  [X] Inadequate Energy Intake  Nutrition Diagnosis is [X] ongoing  New Nutrition Diagnosis: [X] not applicable     EDUCATION:  [X] Provided pt with verbal / written education on oral nutrition supplementation    RECOMMENDATIONS/INTERVENTIONS:  1) Consider liberalizing diet to regular easy to chew diet to promote PO intake  2) Continue provision of Ensure Plus High Protein 1x daily   3)  department to provide Magic Cup 1x/day (290 kcal, 9 gm protein) to promote optimal PO intake   4) Obtain new weight using calibration as able - RN team aware   5) Consider addition of bowel regimen to promote stool regularity  * pt at high risk for malnutrition, RD to continue to monitor and reassess     MONITORING & EVALUATING:  PO intake, tolerance to diet/supplement, nutrition related lab values, weight trends, BMs/GI distress, hydration status, skin integrity.    Mary El MS, RD | available on Teams | RD office #: [id number]

## 2025-06-23 NOTE — PROGRESS NOTE ADULT - ASSESSMENT
A/P  84M with history of HTN, HLD, and BCC of forehead s/p excision brought in by daughter for hallucinations.     #Visual hallucination.   -CT head with No adverse interval change April 2025  -CXR with No focal consolidation.  -without any signs or symptoms of localizing infections   -MRI brain shows No MRI evidence of acute intracranial pathology. Age appropriate, mild involutional changes. Minimal microvascular type changes in the periventricular white matter.  -neuro f/u   -psych f/u   -ophthalmology f/u for "double vs triple vision" per daughter   -delirium precautions  -eeg per neuro     #HTN  -cont amlodipine    #HLD (hyperlipidemia).   -cont statin       dvt ppx

## 2025-06-24 LAB
ALBUMIN SERPL ELPH-MCNC: 3.6 G/DL — SIGNIFICANT CHANGE UP (ref 3.3–5)
ALP SERPL-CCNC: 137 U/L — HIGH (ref 40–120)
ALT FLD-CCNC: 30 U/L — SIGNIFICANT CHANGE UP (ref 4–41)
AMPHIPHYSIN AB TITR SER: NEGATIVE — SIGNIFICANT CHANGE UP
ANION GAP SERPL CALC-SCNC: 11 MMOL/L — SIGNIFICANT CHANGE UP (ref 7–14)
AST SERPL-CCNC: 25 U/L — SIGNIFICANT CHANGE UP (ref 4–40)
BASOPHILS # BLD AUTO: 0.02 K/UL — SIGNIFICANT CHANGE UP (ref 0–0.2)
BASOPHILS NFR BLD AUTO: 0.2 % — SIGNIFICANT CHANGE UP (ref 0–2)
BILIRUB SERPL-MCNC: 0.7 MG/DL — SIGNIFICANT CHANGE UP (ref 0.2–1.2)
BUN SERPL-MCNC: 20 MG/DL — SIGNIFICANT CHANGE UP (ref 7–23)
CALCIUM SERPL-MCNC: 9.6 MG/DL — SIGNIFICANT CHANGE UP (ref 8.4–10.5)
CHLORIDE SERPL-SCNC: 104 MMOL/L — SIGNIFICANT CHANGE UP (ref 98–107)
CO2 SERPL-SCNC: 24 MMOL/L — SIGNIFICANT CHANGE UP (ref 22–31)
CREAT SERPL-MCNC: 0.78 MG/DL — SIGNIFICANT CHANGE UP (ref 0.5–1.3)
CRMP-5-IGG WESTERN BLOT: NEGATIVE — SIGNIFICANT CHANGE UP
EGFR: 88 ML/MIN/1.73M2 — SIGNIFICANT CHANGE UP
EGFR: 88 ML/MIN/1.73M2 — SIGNIFICANT CHANGE UP
EOSINOPHIL # BLD AUTO: 0 K/UL — SIGNIFICANT CHANGE UP (ref 0–0.5)
EOSINOPHIL NFR BLD AUTO: 0 % — SIGNIFICANT CHANGE UP (ref 0–6)
GLIAL NUC TYPE 1 AB TITR SER: NEGATIVE — SIGNIFICANT CHANGE UP
GLUCOSE SERPL-MCNC: 112 MG/DL — HIGH (ref 70–99)
HCT VFR BLD CALC: 47.3 % — SIGNIFICANT CHANGE UP (ref 39–50)
HGB BLD-MCNC: 15.9 G/DL — SIGNIFICANT CHANGE UP (ref 13–17)
HU1 AB TITR SER: NEGATIVE — SIGNIFICANT CHANGE UP
HU2 AB TITR SER IF: NEGATIVE — SIGNIFICANT CHANGE UP
HU3 AB TITR SER: NEGATIVE — SIGNIFICANT CHANGE UP
IFA NOTES: SIGNIFICANT CHANGE UP
IMM GRANULOCYTES # BLD AUTO: 0.06 K/UL — SIGNIFICANT CHANGE UP (ref 0–0.07)
IMM GRANULOCYTES NFR BLD AUTO: 0.5 % — SIGNIFICANT CHANGE UP (ref 0–0.9)
LYMPHOCYTES # BLD AUTO: 1.41 K/UL — SIGNIFICANT CHANGE UP (ref 1–3.3)
LYMPHOCYTES NFR BLD AUTO: 11.9 % — LOW (ref 13–44)
MCHC RBC-ENTMCNC: 27.2 PG — SIGNIFICANT CHANGE UP (ref 27–34)
MCHC RBC-ENTMCNC: 33.6 G/DL — SIGNIFICANT CHANGE UP (ref 32–36)
MCV RBC AUTO: 81 FL — SIGNIFICANT CHANGE UP (ref 80–100)
MONOCYTES # BLD AUTO: 0.57 K/UL — SIGNIFICANT CHANGE UP (ref 0–0.9)
MONOCYTES NFR BLD AUTO: 4.8 % — SIGNIFICANT CHANGE UP (ref 2–14)
NEUTROPHILS # BLD AUTO: 9.83 K/UL — HIGH (ref 1.8–7.4)
NEUTROPHILS NFR BLD AUTO: 82.6 % — HIGH (ref 43–77)
NRBC # BLD AUTO: 0 K/UL — SIGNIFICANT CHANGE UP (ref 0–0)
NRBC # FLD: 0 K/UL — SIGNIFICANT CHANGE UP (ref 0–0)
NRBC BLD AUTO-RTO: 0 /100 WBCS — SIGNIFICANT CHANGE UP (ref 0–0)
PARANEOPLASTIC AB SER-IMP: SIGNIFICANT CHANGE UP
PCA-1 AB TITR SER: NEGATIVE — SIGNIFICANT CHANGE UP
PCA-2 AB TITR SER: NEGATIVE — SIGNIFICANT CHANGE UP
PCA-TR AB TITR SER: NEGATIVE — SIGNIFICANT CHANGE UP
PLATELET # BLD AUTO: 253 K/UL — SIGNIFICANT CHANGE UP (ref 150–400)
PMV BLD: 9.3 FL — SIGNIFICANT CHANGE UP (ref 7–13)
POTASSIUM SERPL-MCNC: 3.9 MMOL/L — SIGNIFICANT CHANGE UP (ref 3.5–5.3)
POTASSIUM SERPL-SCNC: 3.9 MMOL/L — SIGNIFICANT CHANGE UP (ref 3.5–5.3)
PROT SERPL-MCNC: 6.6 G/DL — SIGNIFICANT CHANGE UP (ref 6–8.3)
RBC # BLD: 5.84 M/UL — HIGH (ref 4.2–5.8)
RBC # FLD: 13.2 % — SIGNIFICANT CHANGE UP (ref 10.3–14.5)
SODIUM SERPL-SCNC: 139 MMOL/L — SIGNIFICANT CHANGE UP (ref 135–145)
WBC # BLD: 11.89 K/UL — HIGH (ref 3.8–10.5)
WBC # FLD AUTO: 11.89 K/UL — HIGH (ref 3.8–10.5)

## 2025-06-24 RX ORDER — POLYETHYLENE GLYCOL 3350 17 G/17G
17 POWDER, FOR SOLUTION ORAL DAILY
Refills: 0 | Status: DISCONTINUED | OUTPATIENT
Start: 2025-06-24 | End: 2025-06-25

## 2025-06-24 RX ORDER — SENNA 187 MG
2 TABLET ORAL AT BEDTIME
Refills: 0 | Status: DISCONTINUED | OUTPATIENT
Start: 2025-06-24 | End: 2025-06-25

## 2025-06-24 RX ADMIN — POLYETHYLENE GLYCOL 3350 17 GRAM(S): 17 POWDER, FOR SOLUTION ORAL at 12:01

## 2025-06-24 RX ADMIN — Medication 3 MILLIGRAM(S): at 21:40

## 2025-06-24 RX ADMIN — Medication 1 DROP(S): at 23:33

## 2025-06-24 RX ADMIN — AMLODIPINE BESYLATE 5 MILLIGRAM(S): 10 TABLET ORAL at 05:58

## 2025-06-24 RX ADMIN — NYSTATIN 1 APPLICATION(S): 100000 CREAM TOPICAL at 21:40

## 2025-06-24 RX ADMIN — Medication 1 APPLICATION(S): at 05:58

## 2025-06-24 RX ADMIN — NYSTATIN 1 APPLICATION(S): 100000 CREAM TOPICAL at 05:58

## 2025-06-24 RX ADMIN — Medication 1 DROP(S): at 05:59

## 2025-06-24 RX ADMIN — ENOXAPARIN SODIUM 40 MILLIGRAM(S): 100 INJECTION SUBCUTANEOUS at 05:58

## 2025-06-24 RX ADMIN — ATORVASTATIN CALCIUM 10 MILLIGRAM(S): 80 TABLET, FILM COATED ORAL at 21:38

## 2025-06-24 RX ADMIN — OLANZAPINE 2.5 MILLIGRAM(S): 10 TABLET ORAL at 21:40

## 2025-06-24 RX ADMIN — NYSTATIN 1 APPLICATION(S): 100000 CREAM TOPICAL at 12:00

## 2025-06-24 RX ADMIN — Medication 1 DROP(S): at 17:16

## 2025-06-24 RX ADMIN — Medication 1 DROP(S): at 12:00

## 2025-06-25 ENCOUNTER — TRANSCRIPTION ENCOUNTER (OUTPATIENT)
Age: 85
End: 2025-06-25

## 2025-06-25 VITALS
OXYGEN SATURATION: 95 % | DIASTOLIC BLOOD PRESSURE: 64 MMHG | TEMPERATURE: 99 F | RESPIRATION RATE: 18 BRPM | SYSTOLIC BLOOD PRESSURE: 134 MMHG | HEART RATE: 79 BPM

## 2025-06-25 LAB
ANION GAP SERPL CALC-SCNC: 15 MMOL/L — HIGH (ref 7–14)
BASOPHILS # BLD AUTO: 0.03 K/UL — SIGNIFICANT CHANGE UP (ref 0–0.2)
BASOPHILS NFR BLD AUTO: 0.3 % — SIGNIFICANT CHANGE UP (ref 0–2)
BUN SERPL-MCNC: 19 MG/DL — SIGNIFICANT CHANGE UP (ref 7–23)
CALCIUM SERPL-MCNC: 9 MG/DL — SIGNIFICANT CHANGE UP (ref 8.4–10.5)
CHLORIDE SERPL-SCNC: 103 MMOL/L — SIGNIFICANT CHANGE UP (ref 98–107)
CO2 SERPL-SCNC: 22 MMOL/L — SIGNIFICANT CHANGE UP (ref 22–31)
CREAT SERPL-MCNC: 0.81 MG/DL — SIGNIFICANT CHANGE UP (ref 0.5–1.3)
EGFR: 87 ML/MIN/1.73M2 — SIGNIFICANT CHANGE UP
EGFR: 87 ML/MIN/1.73M2 — SIGNIFICANT CHANGE UP
EOSINOPHIL # BLD AUTO: 0.05 K/UL — SIGNIFICANT CHANGE UP (ref 0–0.5)
EOSINOPHIL NFR BLD AUTO: 0.5 % — SIGNIFICANT CHANGE UP (ref 0–6)
GLUCOSE SERPL-MCNC: 90 MG/DL — SIGNIFICANT CHANGE UP (ref 70–99)
HCT VFR BLD CALC: 47.9 % — SIGNIFICANT CHANGE UP (ref 39–50)
HGB BLD-MCNC: 15.6 G/DL — SIGNIFICANT CHANGE UP (ref 13–17)
IMM GRANULOCYTES # BLD AUTO: 0.03 K/UL — SIGNIFICANT CHANGE UP (ref 0–0.07)
IMM GRANULOCYTES NFR BLD AUTO: 0.3 % — SIGNIFICANT CHANGE UP (ref 0–0.9)
LYMPHOCYTES # BLD AUTO: 1.8 K/UL — SIGNIFICANT CHANGE UP (ref 1–3.3)
LYMPHOCYTES NFR BLD AUTO: 16.7 % — SIGNIFICANT CHANGE UP (ref 13–44)
MAGNESIUM SERPL-MCNC: 2.3 MG/DL — SIGNIFICANT CHANGE UP (ref 1.6–2.6)
MCHC RBC-ENTMCNC: 26.9 PG — LOW (ref 27–34)
MCHC RBC-ENTMCNC: 32.6 G/DL — SIGNIFICANT CHANGE UP (ref 32–36)
MCV RBC AUTO: 82.7 FL — SIGNIFICANT CHANGE UP (ref 80–100)
MONOCYTES # BLD AUTO: 0.65 K/UL — SIGNIFICANT CHANGE UP (ref 0–0.9)
MONOCYTES NFR BLD AUTO: 6 % — SIGNIFICANT CHANGE UP (ref 2–14)
NEUTROPHILS # BLD AUTO: 8.25 K/UL — HIGH (ref 1.8–7.4)
NEUTROPHILS NFR BLD AUTO: 76.2 % — SIGNIFICANT CHANGE UP (ref 43–77)
NRBC # BLD AUTO: 0 K/UL — SIGNIFICANT CHANGE UP (ref 0–0)
NRBC # FLD: 0 K/UL — SIGNIFICANT CHANGE UP (ref 0–0)
NRBC BLD AUTO-RTO: 0 /100 WBCS — SIGNIFICANT CHANGE UP (ref 0–0)
PHOSPHATE SERPL-MCNC: 2.5 MG/DL — SIGNIFICANT CHANGE UP (ref 2.5–4.5)
PLATELET # BLD AUTO: 218 K/UL — SIGNIFICANT CHANGE UP (ref 150–400)
PMV BLD: 10.2 FL — SIGNIFICANT CHANGE UP (ref 7–13)
POTASSIUM SERPL-MCNC: 3.7 MMOL/L — SIGNIFICANT CHANGE UP (ref 3.5–5.3)
POTASSIUM SERPL-SCNC: 3.7 MMOL/L — SIGNIFICANT CHANGE UP (ref 3.5–5.3)
RBC # BLD: 5.79 M/UL — SIGNIFICANT CHANGE UP (ref 4.2–5.8)
RBC # FLD: 13.3 % — SIGNIFICANT CHANGE UP (ref 10.3–14.5)
SODIUM SERPL-SCNC: 140 MMOL/L — SIGNIFICANT CHANGE UP (ref 135–145)
WBC # BLD: 10.81 K/UL — HIGH (ref 3.8–10.5)
WBC # FLD AUTO: 10.81 K/UL — HIGH (ref 3.8–10.5)

## 2025-06-25 RX ORDER — POLYETHYLENE GLYCOL 3350 17 G/17G
17 POWDER, FOR SOLUTION ORAL
Qty: 0 | Refills: 0 | DISCHARGE
Start: 2025-06-25

## 2025-06-25 RX ORDER — MELATONIN 5 MG
1 TABLET ORAL
Qty: 0 | Refills: 0 | DISCHARGE
Start: 2025-06-25

## 2025-06-25 RX ORDER — LANOLIN/MINERAL OIL/PETROLATUM
1 OINTMENT (GRAM) OPHTHALMIC (EYE)
Qty: 0 | Refills: 0 | DISCHARGE
Start: 2025-06-25

## 2025-06-25 RX ORDER — NYSTATIN 100000 [USP'U]/G
0 CREAM TOPICAL
Refills: 0 | DISCHARGE
Start: 2025-06-25

## 2025-06-25 RX ORDER — OLANZAPINE 10 MG/1
1 TABLET ORAL
Qty: 0 | Refills: 0 | DISCHARGE
Start: 2025-06-25

## 2025-06-25 RX ORDER — SENNA 187 MG
2 TABLET ORAL
Qty: 0 | Refills: 0 | DISCHARGE
Start: 2025-06-25

## 2025-06-25 RX ADMIN — NYSTATIN 1 APPLICATION(S): 100000 CREAM TOPICAL at 13:02

## 2025-06-25 RX ADMIN — Medication 1 APPLICATION(S): at 06:17

## 2025-06-25 RX ADMIN — AMLODIPINE BESYLATE 5 MILLIGRAM(S): 10 TABLET ORAL at 06:14

## 2025-06-25 RX ADMIN — NYSTATIN 1 APPLICATION(S): 100000 CREAM TOPICAL at 06:16

## 2025-06-25 RX ADMIN — ENOXAPARIN SODIUM 40 MILLIGRAM(S): 100 INJECTION SUBCUTANEOUS at 06:13

## 2025-06-25 RX ADMIN — Medication 1 DROP(S): at 13:00

## 2025-06-25 RX ADMIN — Medication 1 DROP(S): at 06:17

## 2025-06-25 NOTE — DISCHARGE NOTE NURSING/CASE MANAGEMENT/SOCIAL WORK - PATIENT PORTAL LINK FT
You can access the FollowMyHealth Patient Portal offered by [facility] by registering at the following website: [email or web address]. By joining [employer]’s FollowMyHealth portal, you will also be able to view your health information using other applications (apps) compatible with our system.

## 2025-06-25 NOTE — DISCHARGE NOTE NURSING/CASE MANAGEMENT/SOCIAL WORK - FINANCIAL ASSISTANCE
[facility] provides services at a reduced cost to those who are determined to be eligible through [facility]’s financial assistance program. Information regarding [facility]’s financial assistance program can be found by going to [email or web address] or by calling [phone].

## 2025-06-25 NOTE — PROGRESS NOTE ADULT - REASON FOR ADMISSION
hallucinations, unsafe disposition

## 2025-06-25 NOTE — PROGRESS NOTE ADULT - TIME BILLING
Agree with above ACP note.  cv stable  cont current tx
Patient seen and examined.  Agree with above ACP note.  cv stable  cont current tx
agree with above  CV stable  cont current mgmt
Agree with above ACP note.  cv stable  cont current tx

## 2025-06-25 NOTE — DISCHARGE NOTE PROVIDER - NSDCFUADDAPPT_GEN_ALL_CORE_FT
APPTS ARE READY TO BE MADE: [ ] YES    Best Family or Patient Contact (if needed):    Additional Information about above appointments (if needed):    1:   2:   3:     Other comments or requests:       [facility] [phone] (M-F 9am-7pm)   [facility] [phone]    outpatient with FDG-PET scan, neuropsychiatry testing and evaluation. Patient can follow up with Dr. Roman Chen at [address] [place] [address] after discharge. Please call [phone] to schedule this appointment.  No neurology contraindication to discharge to subacute rehab. Patient accepted to [facility] [place].    APPTS ARE READY TO BE MADE: [X] YES    Best Family or Patient Contact (if needed):    Additional Information about above appointments (if needed):    1: Psychiatry  [facility] [phone] (M-F 9am-7pm)   [facility] [phone]  2: Neuropsychiatry - outpatient with FDG-PET scan, neuropsychiatry testing and evaluation. Patient can follow up with Dr. Roman Chen at [address] [place] [address] after discharge. Please call [phone] to schedule this appointment.  3: PCP  4: Dr. Lewis  5: Cardiology    Other comments or requests:

## 2025-06-25 NOTE — DISCHARGE NOTE PROVIDER - PROVIDER TOKENS
PROVIDER:[TOKEN:[[id number]]],PROVIDER:[TOKEN:[[id number]]] PROVIDER:[TOKEN:[[id number]]],PROVIDER:[TOKEN:[[id number]]],PROVIDER:[TOKEN:[[id number]]],PROVIDER:[TOKEN:[[id number]]]

## 2025-06-25 NOTE — DISCHARGE NOTE PROVIDER - HOSPITAL COURSE
85 y/o Yoruba speaking male, with a PmHx of HTN, HLD, Basal Cell Carcinoma (of forehead s/p excision), p/w hallucinations.    1. Visual hallucination.   - No known h/o MCI/dementia, with longstanding "bad dreams" with 2 episodes of visual hallucinations this year, both without confusion and with insight intact, no auditory hallucinations. 1st time was in April in setting of recent surgery/anesthesia likely delirium. This time shortly after waking from dream and in setting of sleep deprivation, potentially delirium vs sign of MCI vs dementia (Lewy body vs Parkinson's)- confusion may be absent and insight preserved in early stages. Theo Bonnet syndrome also in ddx given visual impairment though is a diagnosis of exclusion   - 6/9 CT Head - without acute findings   - Neuro c/s Dr. Lewis following  - Psych c/s following   - Cardio c/s Dr. Gore following    2. HTN   - continue home amlodipine 5mg daily.  - montior bp    3. HLD  - continue home simvastatin 20mg qhs.    Patient's EEG reviewed: negative for seizures.    Given longstanding history of vivid dreams and REM-sleep behavior disorder, suggests possibility of neurodegenerative disease/dementia, possibly Lewy Body Dementia.      Patient seen and evaluated, no acute somatic complaints. Reviewed discharge medications with patient; All new medications requiring new prescriptions were sent to the pharmacy of patient's choice. Reviewed need for prescription for previous home medications and new prescriptions sent if requested. Medically cleared/stable for discharge as per Dr. Reed on 6/25/25 with close outpatient follow up within 1-2 weeks of discharge. Patient understands and agrees with plan of care.    83 y/o Estonian speaking male, with a PmHx of HTN, HLD, Basal Cell Carcinoma (of forehead s/p excision), p/w hallucinations.    1. Visual hallucination.   - No known h/o MCI/dementia, with longstanding "bad dreams" with 2 episodes of visual hallucinations this year, both without confusion and with insight intact, no auditory hallucinations. 1st time was in April in setting of recent surgery/anesthesia likely delirium. This time shortly after waking from dream and in setting of sleep deprivation, potentially delirium vs sign of MCI vs dementia (Lewy body vs Parkinson's)- confusion may be absent and insight preserved in early stages. Theo Bonnet syndrome also in ddx given visual impairment though is a diagnosis of exclusion   - 6/9 CT Head - without acute findings   - Neuro c/s Dr. Lewis following  Patient's EEG reviewed: negative for seizures.  Given longstanding history of vivid dreams and REM-sleep behavior disorder, suggests possibility of neurodegenerative disease/dementia, possibly Lewy Body Dementia.  - Psych c/s following   - Cardio c/s Dr. Gore following  - neurology following  - op f/u with all above and with pcp     2. HTN   - continue home amlodipine 5mg daily.  - montior bp    3. HLD  - continue home simvastatin 20mg qhs.    Patient seen and evaluated, no acute somatic complaints. Reviewed discharge medications with patient; All new medications requiring new prescriptions were sent to the pharmacy of patient's choice. Reviewed need for prescription for previous home medications and new prescriptions sent if requested. Medically cleared/stable for discharge as per Dr. Reed on 6/25/25 with close outpatient follow up within 1-2 weeks of discharge. Patient understands and agrees with plan of care.

## 2025-06-25 NOTE — DISCHARGE NOTE NURSING/CASE MANAGEMENT/SOCIAL WORK - NSDCPEFALRISK_GEN_ALL_CORE
For information on Fall & Injury Prevention, visit: [email or web address] OR  [email or web address] OR  https://www.cdc.gov/steadi/patient.html

## 2025-06-25 NOTE — DISCHARGE NOTE PROVIDER - NSDCMRMEDTOKEN_GEN_ALL_CORE_FT
Norvasc 5 mg oral tablet: 1 tab(s) orally once a day  simvastatin 20 mg oral tablet: 1 tab(s) orally once a day (at bedtime)   melatonin 3 mg oral tablet: 1 tab(s) orally once a day (at bedtime)  Norvasc 5 mg oral tablet: 1 tab(s) orally once a day  nystatin 100,000 units/g topical powder: 1 Apply topically to affected area 3 times a day  ocular lubricant preservative-free ophthalmic solution: 1 drop(s) to each affected eye 4 times a day  OLANZapine 2.5 mg oral tablet: 1 tab(s) orally once a day (at bedtime)  polyethylene glycol 3350 oral powder for reconstitution: 17 gram(s) orally once a day  senna leaf extract oral tablet: 2 tab(s) orally once a day (at bedtime)  simvastatin 20 mg oral tablet: 1 tab(s) orally once a day (at bedtime)

## 2025-06-25 NOTE — DISCHARGE NOTE PROVIDER - CARE PROVIDERS DIRECT ADDRESSES
,DirectAddress_Unknown,[email or web address] ,[email or web address],[email or web address],DirectAddress_Unknown,[email or web address]

## 2025-06-25 NOTE — CHART NOTE - NSCHARTNOTEFT_GEN_A_CORE
Case discussed with at length on rounds with Dr. Rao, neurology attending.    Patient's EEG reviewed: negative for seizures.    Given longstanding history of vivid dreams and REM-sleep behavior disorder, suggests possibility of neurodegenerative disease/dementia, possibly Lewy Body Dementia.    We would recommend follow up outpatient with FDG-PET scan, neuropsychiatry testing and evaluation. Patient can follow up with Dr. Roman Chen at [address] [place] [address] after discharge. Please instruct the patient to call [phone] to schedule this appointment.    No neurology contraindication to discharge to subacute rehab. Patient accepted to [facility].     Call [phone] with any concerns. Patient's case reviewed and discussed with at length on rounds with Dr. Rao, neurology attending.    Patient's EEG reviewed: negative for seizures.    Given longstanding history of vivid dreams and REM-sleep behavior disorder, suggests possibility of neurodegenerative disease/dementia, possibly Lewy Body Dementia.    We would recommend follow up outpatient with FDG-PET scan, neuropsychiatry testing and evaluation. Patient can follow up with Dr. Roman Chen at [address] [place] [address] after discharge. Please call [phone] to schedule this appointment.    No neurology contraindication to discharge to subacute rehab. Patient accepted to [facility].     Call [phone] with any concerns.

## 2025-06-25 NOTE — PROGRESS NOTE ADULT - PROVIDER SPECIALTY LIST ADULT
Cardiology
Hospitalist
Cardiology
Hospitalist
Infectious Disease
Neurology
Cardiology
Hospitalist
Neurology
Cardiology
Cardiology
Hospitalist
Neurology

## 2025-06-25 NOTE — DISCHARGE NOTE NURSING/CASE MANAGEMENT/SOCIAL WORK - NSDCCRNAME_GEN_ALL_CORE_FT
[facility] and [facility] ([address] [place] [address])

## 2025-06-25 NOTE — DISCHARGE NOTE PROVIDER - NSDCQMAMI_CARD_ALL_CORE
NIKOLAS Adair  [facility]   Family Medicine  [address]  [place] [address]  Phone: [phone]  Fax: [phone]         Chief Complaint:  Chief Complaint   Patient presents with    6-month follow up     Patient stated that her ears are feeling much better since last visit.        History:  Viviana Arzate is a 52 y.o. female.  History of Present Illness  The patient is a 52-year-old female who presents for follow-up.    Depression, anxiety: Patient reports she is doing well on Effexor 150 mg daily.  Patient also takes Xanax as needed.  Patient's PDMP reviewed fill dates appropriate.  Patient has med contract located within media.    Hypercholesterolemia: Patient reports she is doing well on pravastatin 40 mg p.o. nightly.  She denies any issues with this therapy.     tenosynovitis: Patient reports she has received injections per Dr. Reyes for symptoms.  She notes some improvement since having the injection.  Patient reports she is still symptomatic and not able to perform her hobby of crocheting.  Patient wishes to return to [employer] as soon as possible for she is expecting 2 grandchildren.    Dizziness: She reports no current episodes of dizziness. She sought consultation from an ENT specialist who diagnosed her with a ruptured eardrum. The only medication that provided relief was promethazine, which she has not taken for the past 2 months, indicating a resolution of the issue.    MEDICATIONS  Discontinued: promethazine           ROS:  Review of Systems   Constitutional:  Negative for fatigue, fever and unexpected weight change.   HENT:  Negative for congestion, ear pain, rhinorrhea, sinus pressure, sinus pain and voice change.    Eyes:  Negative for visual disturbance.   Respiratory:  Negative for shortness of breath and wheezing.    Cardiovascular:  Negative for chest pain and palpitations.   Gastrointestinal:  Negative for abdominal pain, nausea and vomiting.  "  Genitourinary:  Negative for dysuria and flank pain.   Musculoskeletal:  Negative for back pain, myalgias and neck pain.        F/a discomfort.    Skin:  Negative for color change and rash.   Neurological:  Negative for dizziness, weakness, numbness and headaches.   Psychiatric/Behavioral:  Negative for behavioral problems, dysphoric mood, self-injury and sleep disturbance.         reports that she has never smoked. She has never been exposed to tobacco smoke. She has never used smokeless tobacco. She reports current alcohol use of about 8.0 standard drinks of alcohol per week. She reports that she does not use drugs.    Current Outpatient Medications   Medication Instructions    acebutolol (SECTRAL) 200 mg, Oral, Daily    ALPRAZolam (XANAX) 0.5 mg, Oral, 2 Times Daily PRN, for anxiety    azelastine (ASTELIN) 0.1 % nasal spray 2 sprays, Nasal, 2 Times Daily, Use in each nostril as directed    Coenzyme Q10 (Co Q-10) 200 MG capsule 1 capsule, Oral, Daily    cyanocobalamin 1,000 mcg, Intramuscular, Every 28 Days    Diclofenac Sodium (VOLTAREN) 1 % gel gel Apply to left wrist / arm at bedtime and wrap with plastic wrap and ace bandage. Leave all night and take off in the morning.    fluticasone (FLONASE) 50 MCG/ACT nasal spray     ibuprofen (ADVIL,MOTRIN) 200 MG tablet 1 tablet, Every 6 Hours PRN    ipratropium (ATROVENT) 0.06 % nasal spray INHALE 2 SPRAYS IN EACH NOSTRIL THREE TIMES DAILY AS NEEDED FOR ALLERGY AND CONGESTION    levonorgestrel (MIRENA) 20 MCG/24HR IUD 1 each, Once    Meloxicam 15 mg, Translingual, Daily    pravastatin (PRAVACHOL) 40 mg, Oral, Nightly    Probiotic Product (PROBIOTIC ADVANCED PO) 1 capsule, Daily    promethazine (PHENERGAN) 25 mg, Oral, Every 6 Hours PRN    Syringe/Needle, Disp, (BD Integra Syringe) 25G X 1\" 3 ML misc USE WITH B12 SHOT    Tirzepatide-Weight Management (ZEPBOUND) 2.5 mg    valACYclovir (VALTREX) 500 mg, Oral, Daily    venlafaxine (EFFEXOR) 75 MG tablet TAKE 2 TABLETS BY " "MOUTH DAILY       OBJECTIVE:  /75 (BP Location: Left arm, Patient Position: Sitting, Cuff Size: Adult)   Pulse 75   Temp 97.1 °F (36.2 °C) (Infrared)   Resp 20   Ht 152.4 cm (60\")   Wt 60.1 kg (132 lb 8 oz)   SpO2 98%   BMI 25.88 kg/m²    Physical Exam  Vitals and nursing note reviewed.   Constitutional:       Appearance: Normal appearance. She is well-developed.   HENT:      Head: Normocephalic and atraumatic.      Right Ear: Tympanic membrane, ear canal and external ear normal.      Left Ear: Tympanic membrane, ear canal and external ear normal.      Nose: Nose normal. No septal deviation, nasal tenderness or congestion.      Mouth/Throat:      Lips: Pink. No lesions.      Mouth: Mucous membranes are moist. No oral lesions.      Dentition: Normal dentition.      Pharynx: Oropharynx is clear. No pharyngeal swelling, oropharyngeal exudate or posterior oropharyngeal erythema.   Eyes:      General: Lids are normal. Vision grossly intact. No scleral icterus.        Right eye: No discharge.         Left eye: No discharge.      Extraocular Movements: Extraocular movements intact.      Conjunctiva/sclera: Conjunctivae normal.      Right eye: Right conjunctiva is not injected.      Left eye: Left conjunctiva is not injected.      Pupils: Pupils are equal, round, and reactive to light.   Neck:      Thyroid: No thyroid mass.      Trachea: Trachea normal.   Cardiovascular:      Rate and Rhythm: Normal rate and regular rhythm.      Heart sounds: Normal heart sounds. No murmur heard.     No gallop.   Pulmonary:      Effort: Pulmonary effort is normal.      Breath sounds: Normal breath sounds and air entry. No wheezing, rhonchi or rales.   Musculoskeletal:         General: No tenderness or deformity. Normal range of motion.      Left forearm: Tenderness present.      Cervical back: Full passive range of motion without pain, normal range of motion and neck supple.      Thoracic back: Normal.      Right lower leg: No " edema.      Left lower leg: No edema.   Skin:     General: Skin is warm and dry.      Coloration: Skin is not jaundiced.      Findings: No rash.   Neurological:      Mental Status: She is alert and oriented to person, place, and time.      Sensory: Sensation is intact.      Motor: Motor function is intact.      Coordination: Coordination is intact.      Gait: Gait is intact.      Deep Tendon Reflexes: Reflexes are normal and symmetric.   Psychiatric:         Mood and Affect: Mood and affect normal.         Behavior: Behavior normal.         Judgment: Judgment normal.       Physical Exam           Procedures    Results      Assessment/Plan:     Diagnoses and all orders for this visit:    1. Anxiety (Primary)    2. Depression, unspecified depression type    3. Mood swings    4. Insomnia, unspecified type  -     CBC Auto Differential; Future  -     Comprehensive Metabolic Panel; Future  -     Lipid Panel; Future  -     TSH; Future  -     Urinalysis With Culture If Indicated -; Future    5. Medication monitoring encounter  -     Urine Drug Screen - Urine, Clean Catch; Future    6. Vitamin D deficiency  -     Vitamin D 25 hydroxy; Future    7. De Quervain's tenosynovitis  -     Diclofenac Sodium (VOLTAREN) 1 % gel gel; Apply to left wrist / arm at bedtime and wrap with plastic wrap and ace bandage. Leave all night and take off in the morning.  Dispense: 350 g; Refill: 0    8. Vitamin B 12 deficiency  -     Vitamin B12; Future  -     Vitamin B6; Future          An After Visit Summary was printed and given to the patient at discharge.  Return in about 3 months (around 3/4/2025).       Assessment & Plan      I spent 32 minutes caring for Viviana on this date of service. This time includes time spent by me in the following activities: preparing for the visit, reviewing tests, performing a medically appropriate examination and/or evaluation, counseling and educating the patient/family/caregiver, documenting information in the  medical record, independently interpreting results and communicating that information with the patient/family/caregiver, care coordination, ordering medications, and ordering test(s)         Poly CONNOR 12/6/2024   Electronically signed.    Patient or patient representative verbalized consent for the use of Ambient Listening during the visit with  NIKOLAS Adair for chart documentation. 12/6/2024  17:50 CST   No

## 2025-06-25 NOTE — DISCHARGE NOTE PROVIDER - NSDCCPCAREPLAN_GEN_ALL_CORE_FT
PRINCIPAL DISCHARGE DIAGNOSIS  Diagnosis: Hallucinations  Assessment and Plan of Treatment: psychiatry and neurology saw you and believe hallucination may be from sleep deprivation, deliriu, or lewy body dementia. CT head was normal. EEG was negative. Follow up with neurology for further testing as well as psychiatry and pcp.      SECONDARY DISCHARGE DIAGNOSES  Diagnosis: HTN (hypertension)  Assessment and Plan of Treatment: cardiolgoy followed for blood pressure cotnrol

## 2025-06-25 NOTE — PROGRESS NOTE ADULT - SUBJECTIVE AND OBJECTIVE BOX
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 milliGRAM(s) Oral at bedtime  chlorhexidine 2% Cloths 1 Application(s) Topical <User Schedule>  enoxaparin Injectable 40 milliGRAM(s) SubCutaneous every 24 hours  melatonin 3 milliGRAM(s) Oral at bedtime  nystatin Powder 1 Application(s) Topical three times a day  OLANZapine 2.5 milliGRAM(s) Oral at bedtime  polyethylene glycol 3350 17 Gram(s) Oral daily  senna 2 Tablet(s) Oral at bedtime    MEDICATIONS  (PRN):  acetaminophen     Tablet .. 650 milliGRAM(s) Oral every 6 hours PRN Temp greater or equal to 38C (100.4F), Mild Pain (1 - 3)  aluminum hydroxide/magnesium hydroxide/simethicone Suspension 30 milliLiter(s) Oral every 4 hours PRN Dyspepsia  OLANZapine 2.5 milliGRAM(s) Oral every 6 hours PRN agitation  ondansetron Injectable 4 milliGRAM(s) IV Push every 8 hours PRN Nausea and/or Vomiting          PHYSICAL EXAM:  GENERAL: NAD, well-groomed, well-developed  HEAD:  Atraumatic, Normocephalic  CHEST/LUNG: Clear to percussion bilaterally; No rales, rhonchi, wheezing, or rubs  HEART: Regular rate and rhythm; No murmurs, rubs, or gallops  ABDOMEN: Soft, Nontender, Nondistended; Bowel sounds present  EXTREMITIES:  2+ Peripheral Pulses, No clubbing, cyanosis, or edema  LYMPH: No lymphadenopathy noted  SKIN: No rashes or lesions    Care Discussed with Consultants/Other Providers [ x] YES  [ ] NO
CARDIOLOGY FOLLOW UP - Dr. Gore    Patient Name: MILO CARDONA    DATE OF SERVICE: 06-19-25 @ 08:35    Patient seen and examined    CC no CP or SOB      REVIEW OF SYSTEMS:  CONSTITUTIONAL: No fever, weight loss, or fatigue  RESPIRATORY: No cough, wheezing, chills or hemoptysis; No Shortness of Breath  CARDIOVASCULAR: No chest pain, palpitations, passing out, dizziness  GASTROINTESTINAL: No abdominal or epigastric pain. No nausea, vomiting, or hematemesis; No diarrhea or constipation. No melena or hematochezia.      PHYSICAL EXAM:  T(C): 36.7 (06-19-25 @ 06:40), Max: 36.9 (06-18-25 @ 20:46)  HR: 82 (06-19-25 @ 06:40) (66 - 82)  BP: 146/61 (06-19-25 @ 06:40) (117/49 - 146/61)  RR: 18 (06-19-25 @ 06:40) (18 - 19)  SpO2: 98% (06-19-25 @ 06:40) (94% - 98%)  Wt(kg): --  I&O's Summary    18 Jun 2025 07:01  -  19 Jun 2025 07:00  --------------------------------------------------------  IN: 100 mL / OUT: 700 mL / NET: -600 mL        Appearance: Normal	  Cardiovascular: Normal S1 S2,RRR, No JVD, No murmurs  Respiratory: Lungs clear to auscultation	  Gastrointestinal:  Soft, Non-tender, + BS	  Extremities: Normal range of motion, No clubbing, cyanosis or edema      Home Medications:  Norvasc 5 mg oral tablet: 1 tab(s) orally once a day (09 Jun 2025 22:50)  simvastatin 20 mg oral tablet: 1 tab(s) orally once a day (at bedtime) (09 Jun 2025 22:50)      MEDICATIONS  (STANDING):  amLODIPine   Tablet 5 milliGRAM(s) Oral daily  artificial tears (preservative free) Ophthalmic Solution 1 Drop(s) Both EYES four times a day  atorvastatin 10 milliGRAM(s) Oral at bedtime  chlorhexidine 2% Cloths 1 Application(s) Topical <User Schedule>  dextrose 5% + sodium chloride 0.45%. 1000 milliLiter(s) (80 mL/Hr) IV Continuous <Continuous>  enoxaparin Injectable 40 milliGRAM(s) SubCutaneous every 24 hours  melatonin 3 milliGRAM(s) Oral at bedtime  nystatin Powder 1 Application(s) Topical three times a day  OLANZapine 2.5 milliGRAM(s) Oral at bedtime      TELEMETRY: 	    ECG:  	  RADIOLOGY:   DIAGNOSTIC TESTING:  [ ] Echocardiogram:  [ ]  Catheterization:  [ ] Stress Test:    OTHER: 	    LABS:	 	                            14.5   6.67  )-----------( 196      ( 18 Jun 2025 06:32 )             42.7     06-18    142  |  107  |  21  ----------------------------<  120[H]  3.8   |  26  |  0.83    Ca    9.5      18 Jun 2025 06:32  Phos  3.0     06-18  Mg     2.00     06-18        Lipid Profile:   Hgb A1C:      BNP:     Creatinine: 0.83 mg/dL (06-18-25 @ 06:32)  Creatinine: 0.91 mg/dL (06-17-25 @ 06:19)  Creatinine: 0.84 mg/dL (06-16-25 @ 05:55)      Hemoglobin: 14.5 g/dL (06-18-25 @ 06:32)  Hemoglobin: 15.9 g/dL (06-17-25 @ 10:00)  Hemoglobin: 14.5 g/dL (06-16-25 @ 05:55)      
CARDIOLOGY FOLLOW UP - Dr. Gore    Patient Name: MILO CARDONA    DATE OF SERVICE: 06-20-25 @ 08:01    Patient seen and examined    CC no CP or SOB      REVIEW OF SYSTEMS:  CONSTITUTIONAL: No fever, weight loss, or fatigue  RESPIRATORY: No cough, wheezing, chills or hemoptysis; No Shortness of Breath  CARDIOVASCULAR: No chest pain, palpitations, passing out, dizziness  GASTROINTESTINAL: No abdominal or epigastric pain. No nausea, vomiting, or hematemesis; No diarrhea or constipation. No melena or hematochezia.      PHYSICAL EXAM:  T(C): 36.3 (06-20-25 @ 05:15), Max: 37.1 (06-19-25 @ 17:18)  HR: 89 (06-20-25 @ 05:15) (70 - 89)  BP: 152/77 (06-20-25 @ 05:15) (134/60 - 152/77)  RR: 18 (06-20-25 @ 05:15) (18 - 19)  SpO2: 98% (06-20-25 @ 05:15) (95% - 98%)  Wt(kg): --  I&O's Summary    19 Jun 2025 07:01  -  20 Jun 2025 07:00  --------------------------------------------------------  IN: 240 mL / OUT: 250 mL / NET: -10 mL        Appearance: Normal	  Cardiovascular: Normal S1 S2,RRR, No JVD, No murmurs  Respiratory: Lungs clear to auscultation	  Gastrointestinal:  Soft, Non-tender, + BS	  Extremities: Normal range of motion, No clubbing, cyanosis or edema      Home Medications:  Norvasc 5 mg oral tablet: 1 tab(s) orally once a day (09 Jun 2025 22:50)  simvastatin 20 mg oral tablet: 1 tab(s) orally once a day (at bedtime) (09 Jun 2025 22:50)      MEDICATIONS  (STANDING):  amLODIPine   Tablet 5 milliGRAM(s) Oral daily  artificial tears (preservative free) Ophthalmic Solution 1 Drop(s) Both EYES four times a day  atorvastatin 10 milliGRAM(s) Oral at bedtime  chlorhexidine 2% Cloths 1 Application(s) Topical <User Schedule>  dextrose 5% + sodium chloride 0.45%. 1000 milliLiter(s) (80 mL/Hr) IV Continuous <Continuous>  enoxaparin Injectable 40 milliGRAM(s) SubCutaneous every 24 hours  melatonin 3 milliGRAM(s) Oral at bedtime  nystatin Powder 1 Application(s) Topical three times a day  OLANZapine 2.5 milliGRAM(s) Oral at bedtime      TELEMETRY: 	    ECG:  	  RADIOLOGY:   DIAGNOSTIC TESTING:  [ ] Echocardiogram:  [ ]  Catheterization:  [ ] Stress Test:    OTHER: 	    LABS:	 	                            15.5   6.67  )-----------( 237      ( 19 Jun 2025 06:26 )             46.4     06-19    144  |  107  |  24[H]  ----------------------------<  112[H]  3.6   |  23  |  0.83    Ca    9.9      19 Jun 2025 06:26  Phos  2.9     06-19  Mg     2.10     06-19    TPro  7.1  /  Alb  3.9  /  TBili  0.3  /  DBili  x   /  AST  39  /  ALT  44[H]  /  AlkPhos  158[H]  06-19      Lipid Profile:   Hgb A1C:      BNP:     Creatinine: 0.83 mg/dL (06-19-25 @ 06:26)  Creatinine: 0.83 mg/dL (06-18-25 @ 06:32)  Creatinine: 0.91 mg/dL (06-17-25 @ 06:19)      Hemoglobin: 15.5 g/dL (06-19-25 @ 06:26)  Hemoglobin: 14.5 g/dL (06-18-25 @ 06:32)  Hemoglobin: 15.9 g/dL (06-17-25 @ 10:00)      
CARDIOLOGY FOLLOW UP - Dr. Gore    Patient Name: MILO CARDONA    DATE OF SERVICE: 06-25-25 @ 08:14    Patient seen and examined    CC no acute CV events       REVIEW OF SYSTEMS:  CONSTITUTIONAL: No fever, weight loss, or fatigue  RESPIRATORY: No cough, wheezing, chills or hemoptysis; No Shortness of Breath  CARDIOVASCULAR: No chest pain, palpitations, passing out, dizziness  GASTROINTESTINAL: No abdominal or epigastric pain. No nausea, vomiting, or hematemesis; No diarrhea or constipation. No melena or hematochezia.      PHYSICAL EXAM:  T(C): 37 (06-25-25 @ 06:00), Max: 37.8 (06-24-25 @ 17:53)  HR: 79 (06-25-25 @ 06:00) (77 - 87)  BP: 165/68 (06-25-25 @ 06:00) (126/52 - 165/68)  RR: 17 (06-25-25 @ 06:00) (17 - 18)  SpO2: 97% (06-25-25 @ 06:00) (95% - 97%)  Wt(kg): --  I&O's Summary    24 Jun 2025 07:01  -  25 Jun 2025 07:00  --------------------------------------------------------  IN: 640 mL / OUT: 1200 mL / NET: -560 mL        Appearance: Normal	  Cardiovascular: Normal S1 S2,RRR, No JVD, No murmurs  Respiratory: Lungs clear to auscultation	  Gastrointestinal:  Soft, Non-tender, + BS	  Extremities: Normal range of motion, No clubbing, cyanosis or edema      Home Medications:  Norvasc 5 mg oral tablet: 1 tab(s) orally once a day (09 Jun 2025 22:50)  simvastatin 20 mg oral tablet: 1 tab(s) orally once a day (at bedtime) (09 Jun 2025 22:50)      MEDICATIONS  (STANDING):  amLODIPine   Tablet 5 milliGRAM(s) Oral daily  artificial tears (preservative free) Ophthalmic Solution 1 Drop(s) Both EYES four times a day  atorvastatin 10 milliGRAM(s) Oral at bedtime  chlorhexidine 2% Cloths 1 Application(s) Topical <User Schedule>  enoxaparin Injectable 40 milliGRAM(s) SubCutaneous every 24 hours  melatonin 3 milliGRAM(s) Oral at bedtime  nystatin Powder 1 Application(s) Topical three times a day  OLANZapine 2.5 milliGRAM(s) Oral at bedtime  polyethylene glycol 3350 17 Gram(s) Oral daily  senna 2 Tablet(s) Oral at bedtime      TELEMETRY: 	    ECG:  	  RADIOLOGY:   DIAGNOSTIC TESTING:  [ ] Echocardiogram:  [ ]  Catheterization:  [ ] Stress Test:    OTHER: 	    LABS:	 	                            15.6   10.81 )-----------( 218      ( 25 Jun 2025 06:20 )             47.9     06-25    140  |  103  |  19  ----------------------------<  90  3.7   |  22  |  0.81    Ca    9.0      25 Jun 2025 06:20  Phos  2.5     06-25  Mg     2.30     06-25    TPro  6.6  /  Alb  3.6  /  TBili  0.7  /  DBili  x   /  AST  25  /  ALT  30  /  AlkPhos  137[H]  06-24      Lipid Profile:   Hgb A1C:      BNP:     Creatinine: 0.81 mg/dL (06-25-25 @ 06:20)  Creatinine: 0.78 mg/dL (06-24-25 @ 06:18)  Creatinine: 0.80 mg/dL (06-23-25 @ 04:45)  Creatinine: 0.85 mg/dL (06-22-25 @ 05:27)      Hemoglobin: 15.6 g/dL (06-25-25 @ 06:20)  Hemoglobin: 15.9 g/dL (06-24-25 @ 06:18)  Hemoglobin: 15.1 g/dL (06-23-25 @ 04:45)  Hemoglobin: 14.8 g/dL (06-22-25 @ 05:27)      
CARDIOLOGY FOLLOW UP - Dr. Gore  DATE OF SERVICE: 6/21/25    CC  No acute cv events     REVIEW OF SYSTEMS:  CONSTITUTIONAL: No fever, weight loss, or fatigue  RESPIRATORY: No cough, wheezing, chills or hemoptysis; No Shortness of Breath  CARDIOVASCULAR: No chest pain, palpitations, passing out, dizziness, or leg swelling  GASTROINTESTINAL: No abdominal or epigastric pain. No nausea, vomiting, or hematemesis; No diarrhea or constipation. No melena or hematochezia.  VASCULAR: No edema     PHYSICAL EXAM:  T(C): 36.6 (06-21-25 @ 04:58), Max: 37 (06-20-25 @ 17:51)  HR: 80 (06-21-25 @ 04:58) (70 - 85)  BP: 150/78 (06-21-25 @ 04:58) (149/64 - 175/78)  RR: 18 (06-21-25 @ 04:58) (17 - 18)  SpO2: 97% (06-21-25 @ 04:58) (96% - 100%)  Wt(kg): --  I&O's Summary    20 Jun 2025 07:01  -  21 Jun 2025 07:00  --------------------------------------------------------  IN: 0 mL / OUT: 400 mL / NET: -400 mL        Appearance: Elderly male 	  Cardiovascular: Normal S1 S2,RRR, No JVD, No murmurs  Respiratory: Lungs clear to auscultation b/l 	  Gastrointestinal:  Soft, Non-tender, + BS	  Extremities: Normal range of motion, No clubbing, cyanosis or edema      Home Medications:  Norvasc 5 mg oral tablet: 1 tab(s) orally once a day (09 Jun 2025 22:50)  simvastatin 20 mg oral tablet: 1 tab(s) orally once a day (at bedtime) (09 Jun 2025 22:50)      MEDICATIONS  (STANDING):  amLODIPine   Tablet 5 milliGRAM(s) Oral daily  artificial tears (preservative free) Ophthalmic Solution 1 Drop(s) Both EYES four times a day  atorvastatin 10 milliGRAM(s) Oral at bedtime  chlorhexidine 2% Cloths 1 Application(s) Topical <User Schedule>  enoxaparin Injectable 40 milliGRAM(s) SubCutaneous every 24 hours  melatonin 3 milliGRAM(s) Oral at bedtime  nystatin Powder 1 Application(s) Topical three times a day  OLANZapine 2.5 milliGRAM(s) Oral at bedtime      TELEMETRY: 	    ECG:  	  RADIOLOGY:   DIAGNOSTIC TESTING:  [ ] Echocardiogram:  [ ]  Catheterization:  [ ] Stress Test:    OTHER: 	    LABS:	 	                            15.8   6.79  )-----------( 256      ( 21 Jun 2025 04:51 )             47.4     06-21    141  |  105  |  16  ----------------------------<  126[H]  4.0   |  23  |  0.80    Ca    9.4      21 Jun 2025 04:51    TPro  6.6  /  Alb  3.5  /  TBili  0.4  /  DBili  x   /  AST  37  /  ALT  41  /  AlkPhos  147[H]  06-21            
CARDIOLOGY FOLLOW UP - Dr. Gore  Date of Service: 06-12-25 @ 10:13    CC: no events    Review of Systems:  Constitutional: No fever, weight loss, or fatigue  Respiratory: No cough, wheezing, or hemoptysis, no shortness of breath  Cardiovascular: No chest pain, palpitations, passing out, dizziness, or leg swelling  Gastrointestinal: No abd or epigastric pain. No nausea, vomiting, or hematemesis; no diarrhea or consiptaiton, no melena or hematochezia  Vascular: No edema     TELEMETRY:    PHYSICAL EXAM:  T(C): 36.8 (06-12-25 @ 09:11), Max: 37.1 (06-12-25 @ 06:30)  HR: 72 (06-12-25 @ 09:11) (72 - 88)  BP: 170/77 (06-12-25 @ 09:11) (137/60 - 170/77)  RR: 18 (06-12-25 @ 09:11) (17 - 20)  SpO2: 100% (06-12-25 @ 09:11) (97% - 100%)  Wt(kg): --  I&O's Summary    11 Jun 2025 07:01  -  12 Jun 2025 07:00  --------------------------------------------------------  IN: 200 mL / OUT: 2150 mL / NET: -1950 mL        Appearance: Normal	  Cardiovascular: Normal S1 S2,RRR, No JVD, No murmurs  Respiratory: Lungs clear to auscultation	  Gastrointestinal:  Soft, Non-tender, + BS	  Extremities: Normal range of motion, No clubbing, cyanosis or edema  Vascular: Peripheral pulses palpable 2+ bilaterally       Home Medications:  Norvasc 5 mg oral tablet: 1 tab(s) orally once a day (09 Jun 2025 22:50)  simvastatin 20 mg oral tablet: 1 tab(s) orally once a day (at bedtime) (09 Jun 2025 22:50)        MEDICATIONS  (STANDING):  amLODIPine   Tablet 5 milliGRAM(s) Oral daily  atorvastatin 10 milliGRAM(s) Oral at bedtime  chlorhexidine 2% Cloths 1 Application(s) Topical <User Schedule>  enoxaparin Injectable 40 milliGRAM(s) SubCutaneous every 24 hours  OLANZapine 2.5 milliGRAM(s) Oral once        EKG:  RADIOLOGY:  DIAGNOSTIC TESTING:  [ ] Echocardiogram:  [ ] Catherterization:  [ ] Stress Test:  OTHER:     LABS:	 	                          16.3   7.18  )-----------( 117      ( 12 Jun 2025 05:35 )             48.5     06-12    142  |  105  |  17  ----------------------------<  88  3.7   |  23  |  0.91    Ca    9.4      12 Jun 2025 05:35  Phos  3.1     06-12  Mg     2.20     06-12    TPro  6.8  /  Alb  x   /  TBili  x   /  DBili  x   /  AST  x   /  ALT  x   /  AlkPhos  x   06-12          CARDIAC MARKERS:                  
Patient is a 84y old  Male who presents with a chief complaint of hallucinations, unsafe disposition (13 Jun 2025 12:36)    Date of servie : 06-13-25 @ 14:31  INTERVAL HPI/OVERNIGHT EVENTS:  T(C): 36.9 (06-13-25 @ 14:12), Max: 37.4 (06-13-25 @ 10:49)  HR: 95 (06-13-25 @ 14:12) (70 - 95)  BP: 155/67 (06-13-25 @ 14:12) (125/62 - 155/67)  RR: 18 (06-13-25 @ 14:12) (17 - 18)  SpO2: 99% (06-13-25 @ 14:12) (98% - 99%)  Wt(kg): --  I&O's Summary    12 Jun 2025 07:01  -  13 Jun 2025 07:00  --------------------------------------------------------  IN: 100 mL / OUT: 400 mL / NET: -300 mL    13 Jun 2025 07:01  -  13 Jun 2025 14:31  --------------------------------------------------------  IN: 250 mL / OUT: 200 mL / NET: 50 mL        LABS:                        16.3   7.18  )-----------( 117      ( 12 Jun 2025 05:35 )             48.5     06-13    143  |  105  |  31[H]  ----------------------------<  83  3.9   |  21[L]  |  1.25    Ca    9.7      13 Jun 2025 06:12  Phos  3.8     06-13  Mg     2.20     06-13    TPro  6.8  /  Alb  x   /  TBili  x   /  DBili  x   /  AST  x   /  ALT  x   /  AlkPhos  x   06-12      Urinalysis Basic - ( 13 Jun 2025 06:12 )    Color: x / Appearance: x / SG: x / pH: x  Gluc: 83 mg/dL / Ketone: x  / Bili: x / Urobili: x   Blood: x / Protein: x / Nitrite: x   Leuk Esterase: x / RBC: x / WBC x   Sq Epi: x / Non Sq Epi: x / Bacteria: x      CAPILLARY BLOOD GLUCOSE            Urinalysis Basic - ( 13 Jun 2025 06:12 )    Color: x / Appearance: x / SG: x / pH: x  Gluc: 83 mg/dL / Ketone: x  / Bili: x / Urobili: x   Blood: x / Protein: x / Nitrite: x   Leuk Esterase: x / RBC: x / WBC x   Sq Epi: x / Non Sq Epi: x / Bacteria: x        MEDICATIONS  (STANDING):  amLODIPine   Tablet 5 milliGRAM(s) Oral daily  artificial tears (preservative free) Ophthalmic Solution 1 Drop(s) Both EYES four times a day  atorvastatin 10 milliGRAM(s) Oral at bedtime  chlorhexidine 2% Cloths 1 Application(s) Topical <User Schedule>  enoxaparin Injectable 40 milliGRAM(s) SubCutaneous every 24 hours  melatonin 3 milliGRAM(s) Oral at bedtime  OLANZapine 2.5 milliGRAM(s) Oral at bedtime    MEDICATIONS  (PRN):  acetaminophen     Tablet .. 650 milliGRAM(s) Oral every 6 hours PRN Temp greater or equal to 38C (100.4F), Mild Pain (1 - 3)  aluminum hydroxide/magnesium hydroxide/simethicone Suspension 30 milliLiter(s) Oral every 4 hours PRN Dyspepsia  OLANZapine 2.5 milliGRAM(s) Oral every 6 hours PRN agitation  ondansetron Injectable 4 milliGRAM(s) IV Push every 8 hours PRN Nausea and/or Vomiting          PHYSICAL EXAM:  GENERAL: NAD, well-groomed, well-developed  HEAD:  Atraumatic, Normocephalic  CHEST/LUNG: Clear to percussion bilaterally; No rales, rhonchi, wheezing, or rubs  HEART: Regular rate and rhythm; No murmurs, rubs, or gallops  ABDOMEN: Soft, Nontender, Nondistended; Bowel sounds present  EXTREMITIES:  2+ Peripheral Pulses, No clubbing, cyanosis, or edema  LYMPH: No lymphadenopathy noted  SKIN: No rashes or lesions    Care Discussed with Consultants/Other Providers [ ] YES  [ ] NO
Patient is a 84y old  Male who presents with a chief complaint of hallucinations, unsafe disposition (17 Jun 2025 13:29)    Date of servie : 06-17-25 @ 15:42  INTERVAL HPI/OVERNIGHT EVENTS:  T(C): 36.6 (06-17-25 @ 09:58), Max: 36.7 (06-16-25 @ 18:45)  HR: 74 (06-17-25 @ 09:58) (68 - 78)  BP: 176/75 (06-17-25 @ 09:58) (130/64 - 176/75)  RR: 18 (06-17-25 @ 09:58) (17 - 18)  SpO2: 98% (06-17-25 @ 09:58) (97% - 98%)  Wt(kg): --  I&O's Summary    16 Jun 2025 07:01  -  17 Jun 2025 07:00  --------------------------------------------------------  IN: 480 mL / OUT: 800 mL / NET: -320 mL        LABS:                        15.9   6.98  )-----------( 208      ( 17 Jun 2025 10:00 )             48.3     06-17    144  |  110[H]  |  23  ----------------------------<  149[H]  3.9   |  23  |  0.91    Ca    8.9      17 Jun 2025 06:19  Phos  2.8     06-17  Mg     2.10     06-17        Urinalysis Basic - ( 17 Jun 2025 06:19 )    Color: x / Appearance: x / SG: x / pH: x  Gluc: 149 mg/dL / Ketone: x  / Bili: x / Urobili: x   Blood: x / Protein: x / Nitrite: x   Leuk Esterase: x / RBC: x / WBC x   Sq Epi: x / Non Sq Epi: x / Bacteria: x      CAPILLARY BLOOD GLUCOSE            Urinalysis Basic - ( 17 Jun 2025 06:19 )    Color: x / Appearance: x / SG: x / pH: x  Gluc: 149 mg/dL / Ketone: x  / Bili: x / Urobili: x   Blood: x / Protein: x / Nitrite: x   Leuk Esterase: x / RBC: x / WBC x   Sq Epi: x / Non Sq Epi: x / Bacteria: x        MEDICATIONS  (STANDING):  amLODIPine   Tablet 5 milliGRAM(s) Oral daily  artificial tears (preservative free) Ophthalmic Solution 1 Drop(s) Both EYES four times a day  atorvastatin 10 milliGRAM(s) Oral at bedtime  chlorhexidine 2% Cloths 1 Application(s) Topical <User Schedule>  dextrose 5% + sodium chloride 0.45%. 1000 milliLiter(s) (80 mL/Hr) IV Continuous <Continuous>  enoxaparin Injectable 40 milliGRAM(s) SubCutaneous every 24 hours  melatonin 3 milliGRAM(s) Oral at bedtime  nystatin Powder 1 Application(s) Topical three times a day  OLANZapine 2.5 milliGRAM(s) Oral at bedtime    MEDICATIONS  (PRN):  acetaminophen     Tablet .. 650 milliGRAM(s) Oral every 6 hours PRN Temp greater or equal to 38C (100.4F), Mild Pain (1 - 3)  aluminum hydroxide/magnesium hydroxide/simethicone Suspension 30 milliLiter(s) Oral every 4 hours PRN Dyspepsia  OLANZapine 2.5 milliGRAM(s) Oral every 6 hours PRN agitation  ondansetron Injectable 4 milliGRAM(s) IV Push every 8 hours PRN Nausea and/or Vomiting          PHYSICAL EXAM:  GENERAL: NAD, well-groomed, well-developed  HEAD:  Atraumatic, Normocephalic  CHEST/LUNG: Clear to percussion bilaterally; No rales, rhonchi, wheezing, or rubs  HEART: Regular rate and rhythm; No murmurs, rubs, or gallops  ABDOMEN: Soft, Nontender, Nondistended; Bowel sounds present  EXTREMITIES:  2+ Peripheral Pulses, No clubbing, cyanosis, or edema  LYMPH: No lymphadenopathy noted  SKIN: No rashes or lesions    Care Discussed with Consultants/Other Providers [ ] YES  [ ] NO
Patient is a 84y old  Male who presents with a chief complaint of hallucinations, unsafe disposition (17 Jun 2025 15:42)    Date of servie : 06-18-25 @ 16:14  INTERVAL HPI/OVERNIGHT EVENTS:  T(C): 36.7 (06-18-25 @ 10:50), Max: 37.6 (06-17-25 @ 21:45)  HR: 66 (06-18-25 @ 10:50) (65 - 77)  BP: 131/58 (06-18-25 @ 10:50) (118/53 - 142/54)  RR: 19 (06-18-25 @ 10:50) (18 - 19)  SpO2: 97% (06-18-25 @ 10:50) (94% - 97%)  Wt(kg): --  I&O's Summary    17 Jun 2025 07:01  -  18 Jun 2025 07:00  --------------------------------------------------------  IN: 0 mL / OUT: 700 mL / NET: -700 mL        LABS:                        14.5   6.67  )-----------( 196      ( 18 Jun 2025 06:32 )             42.7     06-18    142  |  107  |  21  ----------------------------<  120[H]  3.8   |  26  |  0.83    Ca    9.5      18 Jun 2025 06:32  Phos  3.0     06-18  Mg     2.00     06-18        Urinalysis Basic - ( 18 Jun 2025 06:32 )    Color: x / Appearance: x / SG: x / pH: x  Gluc: 120 mg/dL / Ketone: x  / Bili: x / Urobili: x   Blood: x / Protein: x / Nitrite: x   Leuk Esterase: x / RBC: x / WBC x   Sq Epi: x / Non Sq Epi: x / Bacteria: x      CAPILLARY BLOOD GLUCOSE            Urinalysis Basic - ( 18 Jun 2025 06:32 )    Color: x / Appearance: x / SG: x / pH: x  Gluc: 120 mg/dL / Ketone: x  / Bili: x / Urobili: x   Blood: x / Protein: x / Nitrite: x   Leuk Esterase: x / RBC: x / WBC x   Sq Epi: x / Non Sq Epi: x / Bacteria: x        MEDICATIONS  (STANDING):  amLODIPine   Tablet 5 milliGRAM(s) Oral daily  artificial tears (preservative free) Ophthalmic Solution 1 Drop(s) Both EYES four times a day  atorvastatin 10 milliGRAM(s) Oral at bedtime  chlorhexidine 2% Cloths 1 Application(s) Topical <User Schedule>  dextrose 5% + sodium chloride 0.45%. 1000 milliLiter(s) (80 mL/Hr) IV Continuous <Continuous>  enoxaparin Injectable 40 milliGRAM(s) SubCutaneous every 24 hours  melatonin 3 milliGRAM(s) Oral at bedtime  nystatin Powder 1 Application(s) Topical three times a day  OLANZapine 2.5 milliGRAM(s) Oral at bedtime    MEDICATIONS  (PRN):  acetaminophen     Tablet .. 650 milliGRAM(s) Oral every 6 hours PRN Temp greater or equal to 38C (100.4F), Mild Pain (1 - 3)  aluminum hydroxide/magnesium hydroxide/simethicone Suspension 30 milliLiter(s) Oral every 4 hours PRN Dyspepsia  OLANZapine 2.5 milliGRAM(s) Oral every 6 hours PRN agitation  ondansetron Injectable 4 milliGRAM(s) IV Push every 8 hours PRN Nausea and/or Vomiting          PHYSICAL EXAM:  GENERAL: NAD, well-groomed, well-developed  HEAD:  Atraumatic, Normocephalic  CHEST/LUNG: Clear to percussion bilaterally; No rales, rhonchi, wheezing, or rubs  HEART: Regular rate and rhythm; No murmurs, rubs, or gallops  ABDOMEN: Soft, Nontender, Nondistended; Bowel sounds present  EXTREMITIES:  2+ Peripheral Pulses, No clubbing, cyanosis, or edema  LYMPH: No lymphadenopathy noted  SKIN: No rashes or lesions    Care Discussed with Consultants/Other Providers [ ] YES  [ ] NO
Patient is a 84y old  Male who presents with a chief complaint of hallucinations, unsafe disposition (19 Jun 2025 08:35)    Date of servie : 06-19-25 @ 17:07  INTERVAL HPI/OVERNIGHT EVENTS:  T(C): 36.9 (06-19-25 @ 10:33), Max: 36.9 (06-18-25 @ 20:46)  HR: 84 (06-19-25 @ 10:33) (70 - 84)  BP: 137/58 (06-19-25 @ 10:33) (117/49 - 146/61)  RR: 19 (06-19-25 @ 10:33) (18 - 19)  SpO2: 95% (06-19-25 @ 10:33) (94% - 98%)  Wt(kg): --  I&O's Summary    18 Jun 2025 07:01  -  19 Jun 2025 07:00  --------------------------------------------------------  IN: 100 mL / OUT: 700 mL / NET: -600 mL        LABS:                        15.5   6.67  )-----------( 237      ( 19 Jun 2025 06:26 )             46.4     06-19    144  |  107  |  24[H]  ----------------------------<  112[H]  3.6   |  23  |  0.83    Ca    9.9      19 Jun 2025 06:26  Phos  2.9     06-19  Mg     2.10     06-19    TPro  7.1  /  Alb  3.9  /  TBili  0.3  /  DBili  x   /  AST  39  /  ALT  44[H]  /  AlkPhos  158[H]  06-19      Urinalysis Basic - ( 19 Jun 2025 06:26 )    Color: x / Appearance: x / SG: x / pH: x  Gluc: 112 mg/dL / Ketone: x  / Bili: x / Urobili: x   Blood: x / Protein: x / Nitrite: x   Leuk Esterase: x / RBC: x / WBC x   Sq Epi: x / Non Sq Epi: x / Bacteria: x      CAPILLARY BLOOD GLUCOSE            Urinalysis Basic - ( 19 Jun 2025 06:26 )    Color: x / Appearance: x / SG: x / pH: x  Gluc: 112 mg/dL / Ketone: x  / Bili: x / Urobili: x   Blood: x / Protein: x / Nitrite: x   Leuk Esterase: x / RBC: x / WBC x   Sq Epi: x / Non Sq Epi: x / Bacteria: x        MEDICATIONS  (STANDING):  amLODIPine   Tablet 5 milliGRAM(s) Oral daily  artificial tears (preservative free) Ophthalmic Solution 1 Drop(s) Both EYES four times a day  atorvastatin 10 milliGRAM(s) Oral at bedtime  chlorhexidine 2% Cloths 1 Application(s) Topical <User Schedule>  dextrose 5% + sodium chloride 0.45%. 1000 milliLiter(s) (80 mL/Hr) IV Continuous <Continuous>  enoxaparin Injectable 40 milliGRAM(s) SubCutaneous every 24 hours  melatonin 3 milliGRAM(s) Oral at bedtime  nystatin Powder 1 Application(s) Topical three times a day  OLANZapine 2.5 milliGRAM(s) Oral at bedtime    MEDICATIONS  (PRN):  acetaminophen     Tablet .. 650 milliGRAM(s) Oral every 6 hours PRN Temp greater or equal to 38C (100.4F), Mild Pain (1 - 3)  aluminum hydroxide/magnesium hydroxide/simethicone Suspension 30 milliLiter(s) Oral every 4 hours PRN Dyspepsia  OLANZapine 2.5 milliGRAM(s) Oral every 6 hours PRN agitation  ondansetron Injectable 4 milliGRAM(s) IV Push every 8 hours PRN Nausea and/or Vomiting          PHYSICAL EXAM:  GENERAL: NAD, well-groomed, well-developed  HEAD:  Atraumatic, Normocephalic  CHEST/LUNG: Clear to percussion bilaterally; No rales, rhonchi, wheezing, or rubs  HEART: Regular rate and rhythm; No murmurs, rubs, or gallops  ABDOMEN: Soft, Nontender, Nondistended; Bowel sounds present  EXTREMITIES:  2+ Peripheral Pulses, No clubbing, cyanosis, or edema  LYMPH: No lymphadenopathy noted  SKIN: No rashes or lesions    Care Discussed with Consultants/Other Providers [ ] YES  [ ] NO
CARDIOLOGY FOLLOW UP - Dr. Gore    Patient Name: MILO CARDONA    DATE OF SERVICE: 06-13-25 @ 07:59    Patient seen and examined    CC events noted  no CP or SOB      REVIEW OF SYSTEMS:  CONSTITUTIONAL: No fever, weight loss, or fatigue  RESPIRATORY: No cough, wheezing, chills or hemoptysis; No Shortness of Breath  CARDIOVASCULAR: No chest pain, palpitations, passing out, dizziness  GASTROINTESTINAL: No abdominal or epigastric pain. No nausea, vomiting, or hematemesis; No diarrhea or constipation. No melena or hematochezia.      PHYSICAL EXAM:  T(C): 37.1 (06-13-25 @ 06:00), Max: 37.2 (06-12-25 @ 21:53)  HR: 79 (06-13-25 @ 06:00) (70 - 83)  BP: 148/65 (06-13-25 @ 06:00) (125/62 - 170/77)  RR: 17 (06-13-25 @ 06:00) (17 - 18)  SpO2: 98% (06-13-25 @ 06:00) (98% - 100%)  Wt(kg): --  I&O's Summary    12 Jun 2025 07:01  -  13 Jun 2025 07:00  --------------------------------------------------------  IN: 50 mL / OUT: 300 mL / NET: -250 mL        Appearance: Normal	  Cardiovascular: Normal S1 S2,RRR, No JVD, No murmurs  Respiratory: Lungs clear to auscultation	  Gastrointestinal:  Soft, Non-tender, + BS	  Extremities: Normal range of motion, No clubbing, cyanosis or edema      Home Medications:  Norvasc 5 mg oral tablet: 1 tab(s) orally once a day (09 Jun 2025 22:50)  simvastatin 20 mg oral tablet: 1 tab(s) orally once a day (at bedtime) (09 Jun 2025 22:50)      MEDICATIONS  (STANDING):  amLODIPine   Tablet 5 milliGRAM(s) Oral daily  artificial tears (preservative free) Ophthalmic Solution 1 Drop(s) Both EYES four times a day  atorvastatin 10 milliGRAM(s) Oral at bedtime  chlorhexidine 2% Cloths 1 Application(s) Topical <User Schedule>  enoxaparin Injectable 40 milliGRAM(s) SubCutaneous every 24 hours  melatonin 3 milliGRAM(s) Oral at bedtime  OLANZapine 1.25 milliGRAM(s) Oral at bedtime      TELEMETRY: 	    ECG:  	  RADIOLOGY:   DIAGNOSTIC TESTING:  [ ] Echocardiogram:  [ ]  Catheterization:  [ ] Stress Test:    OTHER: 	  < from: MR Head w/wo IV Cont (06.12.25 @ 18:09) >  IMPRESSION:  No MRI evidence of acute intracranial pathology..  Age appropriate, mild involutional changes.  Minimal microvascular type changes in the periventricular white matter.    < end of copied text >    LABS:	 	                            16.3   7.18  )-----------( 117      ( 12 Jun 2025 05:35 )             48.5     06-12    142  |  105  |  17  ----------------------------<  88  3.7   |  23  |  0.91    Ca    9.4      12 Jun 2025 05:35  Phos  3.1     06-12  Mg     2.20     06-12    TPro  6.8  /  Alb  x   /  TBili  x   /  DBili  x   /  AST  x   /  ALT  x   /  AlkPhos  x   06-12      Lipid Profile:   Hgb A1C:      BNP:     Creatinine: 0.91 mg/dL (06-12-25 @ 05:35)  Creatinine: 0.96 mg/dL (06-10-25 @ 06:00)  Creatinine: 0.93 mg/dL (06-09-25 @ 16:10)      Hemoglobin: 16.3 g/dL (06-12-25 @ 05:35)  Hemoglobin: 14.6 g/dL (06-10-25 @ 06:00)  Hemoglobin: 16.0 g/dL (06-09-25 @ 16:10)      
CARDIOLOGY FOLLOW UP - Dr. Gore  DATE OF SERVICE: 6/22/25    CC  No acute cv events    REVIEW OF SYSTEMS:  CONSTITUTIONAL: No fever, weight loss, or fatigue  RESPIRATORY: No cough, wheezing, chills or hemoptysis; No Shortness of Breath  CARDIOVASCULAR: No chest pain, palpitations, passing out, dizziness, or leg swelling  GASTROINTESTINAL: No abdominal or epigastric pain. No nausea, vomiting, or hematemesis; No diarrhea or constipation. No melena or hematochezia.  VASCULAR: No edema     PHYSICAL EXAM:  T(C): 36.9 (06-22-25 @ 05:07), Max: 37.1 (06-21-25 @ 18:29)  HR: 79 (06-22-25 @ 05:07) (77 - 88)  BP: 136/56 (06-22-25 @ 05:07) (128/63 - 136/56)  RR: 18 (06-22-25 @ 05:07) (18 - 18)  SpO2: 95% (06-22-25 @ 05:07) (95% - 99%)  Wt(kg): --  I&O's Summary    21 Jun 2025 07:01  -  22 Jun 2025 07:00  --------------------------------------------------------  IN: 180 mL / OUT: 0 mL / NET: 180 mL        Appearance: Elderly male 	  Cardiovascular: Normal S1 S2,RRR, No JVD, No murmurs  Respiratory: Lungs clear to auscultation b/l  Gastrointestinal:  Soft, Non-tender, + BS	  Extremities: Normal range of motion, No clubbing, cyanosis or edema      Home Medications:  Norvasc 5 mg oral tablet: 1 tab(s) orally once a day (09 Jun 2025 22:50)  simvastatin 20 mg oral tablet: 1 tab(s) orally once a day (at bedtime) (09 Jun 2025 22:50)      MEDICATIONS  (STANDING):  amLODIPine   Tablet 5 milliGRAM(s) Oral daily  artificial tears (preservative free) Ophthalmic Solution 1 Drop(s) Both EYES four times a day  atorvastatin 10 milliGRAM(s) Oral at bedtime  chlorhexidine 2% Cloths 1 Application(s) Topical <User Schedule>  enoxaparin Injectable 40 milliGRAM(s) SubCutaneous every 24 hours  melatonin 3 milliGRAM(s) Oral at bedtime  nystatin Powder 1 Application(s) Topical three times a day  OLANZapine 2.5 milliGRAM(s) Oral at bedtime      TELEMETRY: 	    ECG:  	  RADIOLOGY:   DIAGNOSTIC TESTING:  [ ] Echocardiogram:  [ ]  Catheterization:  [ ] Stress Test:    OTHER: 	    LABS:	 	                            14.8   6.47  )-----------( 246      ( 22 Jun 2025 05:27 )             45.8     06-22    142  |  108[H]  |  17  ----------------------------<  103[H]  3.6   |  23  |  0.85    Ca    9.2      22 Jun 2025 05:27    TPro  6.4  /  Alb  3.5  /  TBili  0.5  /  DBili  x   /  AST  25  /  ALT  32  /  AlkPhos  131[H]  06-22            
CARDIOLOGY FOLLOW UP NOTE - DR. CLEMENTE    Patient Name: MILO CARDONA    Date of Service: 06-14-25 @ 12:22    events noted      Subjective:    cv: denies chest pain, dyspnea, palpitations, dizziness  pulmonary: denies cough  GI: denies abdominal pain, nausea, vomiting  vascular/legs: no edema   skin: no rash  ROS: otherwise negative   overnight events:      PHYSICAL EXAM:  T(C): 36.9 (06-14-25 @ 09:56), Max: 37 (06-13-25 @ 22:16)  HR: 65 (06-14-25 @ 09:56) (65 - 95)  BP: 128/56 (06-14-25 @ 09:56) (128/56 - 166/71)  RR: 17 (06-14-25 @ 09:56) (17 - 18)  SpO2: 94% (06-14-25 @ 09:56) (94% - 99%)  Wt(kg): --  I&O's Summary    13 Jun 2025 07:01  -  14 Jun 2025 07:00  --------------------------------------------------------  IN: 490 mL / OUT: 600 mL / NET: -110 mL      Daily     Daily     Appearance: Normal	  Cardiovascular: Normal S1 S2,RRR, No JVD, No murmurs  Respiratory: Lungs clear to auscultation	  Gastrointestinal:  Soft, Non-tender, + BS	  Extremities: Normal range of motion, No clubbing, cyanosis or edema      Home Medications:  Norvasc 5 mg oral tablet: 1 tab(s) orally once a day (09 Jun 2025 22:50)  simvastatin 20 mg oral tablet: 1 tab(s) orally once a day (at bedtime) (09 Jun 2025 22:50)      MEDICATIONS  (STANDING):  amLODIPine   Tablet 5 milliGRAM(s) Oral daily  artificial tears (preservative free) Ophthalmic Solution 1 Drop(s) Both EYES four times a day  atorvastatin 10 milliGRAM(s) Oral at bedtime  chlorhexidine 2% Cloths 1 Application(s) Topical <User Schedule>  enoxaparin Injectable 40 milliGRAM(s) SubCutaneous every 24 hours  melatonin 3 milliGRAM(s) Oral at bedtime  OLANZapine 2.5 milliGRAM(s) Oral at bedtime      TELEMETRY: 	    ECG:  	  RADIOLOGY:   DIAGNOSTIC TESTING:  [ ] Echocardiogram:  [ ] Catheterization:  [ ] Stress Test:    OTHER: 	    LABS:	 	    CARDIAC MARKERS:                  06-14    144  |  107  |  27[H]  ----------------------------<  104[H]  3.6   |  24  |  0.98    Ca    9.5      14 Jun 2025 06:13  Phos  2.7     06-14  Mg     2.30     06-14      proBNP:     Lipid Profile:   HgA1c:     Creatinine: 0.98 mg/dL (06-14-25 @ 06:13)  Creatinine: 1.25 mg/dL (06-13-25 @ 06:12)  Creatinine: 0.91 mg/dL (06-12-25 @ 05:35)            
CARDIOLOGY FOLLOW UP NOTE - DR. CLEMENTE    Patient Name: MILO CARDONA    Date of Service: 06-15-25 @ 12:59    no new events    Subjective:    cv: denies chest pain, dyspnea, palpitations, dizziness  pulmonary: denies cough  GI: denies abdominal pain, nausea, vomiting  vascular/legs: no edema   skin: no rash  ROS: otherwise negative   overnight events:      PHYSICAL EXAM:  T(C): 37.1 (06-15-25 @ 10:07), Max: 37.1 (06-14-25 @ 20:52)  HR: 69 (06-15-25 @ 10:07) (65 - 79)  BP: 137/55 (06-15-25 @ 10:07) (125/55 - 148/67)  RR: 17 (06-15-25 @ 10:07) (17 - 18)  SpO2: 95% (06-15-25 @ 10:07) (95% - 99%)  Wt(kg): --  I&O's Summary    14 Jun 2025 07:01  -  15 Hayder 2025 07:00  --------------------------------------------------------  IN: 680 mL / OUT: 1050 mL / NET: -370 mL      Daily     Daily     Appearance: Normal	  Cardiovascular: Normal S1 S2,RRR, No JVD, No murmurs  Respiratory: Lungs clear to auscultation	  Gastrointestinal:  Soft, Non-tender, + BS	  Extremities: Normal range of motion, No clubbing, cyanosis or edema      Home Medications:  Norvasc 5 mg oral tablet: 1 tab(s) orally once a day (09 Jun 2025 22:50)  simvastatin 20 mg oral tablet: 1 tab(s) orally once a day (at bedtime) (09 Jun 2025 22:50)      MEDICATIONS  (STANDING):  amLODIPine   Tablet 5 milliGRAM(s) Oral daily  artificial tears (preservative free) Ophthalmic Solution 1 Drop(s) Both EYES four times a day  atorvastatin 10 milliGRAM(s) Oral at bedtime  chlorhexidine 2% Cloths 1 Application(s) Topical <User Schedule>  enoxaparin Injectable 40 milliGRAM(s) SubCutaneous every 24 hours  melatonin 3 milliGRAM(s) Oral at bedtime  OLANZapine 2.5 milliGRAM(s) Oral at bedtime      TELEMETRY: 	    ECG:  	  RADIOLOGY:   DIAGNOSTIC TESTING:  [ ] Echocardiogram:  [ ] Catheterization:  [ ] Stress Test:    OTHER: 	    LABS:	 	    CARDIAC MARKERS:                  06-14    144  |  107  |  27[H]  ----------------------------<  104[H]  3.6   |  24  |  0.98    Ca    9.5      14 Jun 2025 06:13  Phos  2.7     06-14  Mg     2.30     06-14      proBNP:     Lipid Profile:   HgA1c:     Creatinine: 0.98 mg/dL (06-14-25 @ 06:13)  Creatinine: 1.25 mg/dL (06-13-25 @ 06:12)            
[facility]  SHELLY Alvarado Y. Patel, S. Shah, G. Casimir  [phone]  ([phone] - weekdays after 5pm and weekends)    Name: MILO CARDONA  Age/Gender: 84y Male  MRN: [id number]    Interval History:  Patient seen and examined this morning.   Resting comfortably, refusing am meds per RN.   Notes reviewed. Afebrile   Allergies: No Known Allergies      Objective:  Vitals:   T(F): 98.5 (06-21-25 @ 10:19), Max: 98.6 (06-20-25 @ 17:51)  HR: 77 (06-21-25 @ 10:19) (77 - 85)  BP: 128/63 (06-21-25 @ 10:19) (128/63 - 175/78)  RR: 18 (06-21-25 @ 10:19) (17 - 18)  SpO2: 96% (06-21-25 @ 10:19) (96% - 100%)  Physical Examination:  General: no acute distress, nontoxic appearing   HEENT: normocephalic, atraumatic, anicteric  Respiratory: no acc muscle use, breathing comfortably  Cardiovascular: S1 and S2 present  Gastrointestinal: normal appearing, nondistended  Extremities: no edema, no cyanosis  Skin: no visible rash    Laboratory Studies:  CBC:                       15.8   6.79  )-----------( 256      ( 21 Jun 2025 04:51 )             47.4     WBC Trend:  6.79 06-21-25 @ 04:51  6.67 06-19-25 @ 06:26  6.67 06-18-25 @ 06:32  6.98 06-17-25 @ 10:00  5.87 06-16-25 @ 05:55    CMP: 06-21    141  |  105  |  16  ----------------------------<  126[H]  4.0   |  23  |  0.80    Ca    9.4      21 Jun 2025 04:51    TPro  6.6  /  Alb  3.5  /  TBili  0.4  /  DBili  x   /  AST  37  /  ALT  41  /  AlkPhos  147[H]  06-21    Creatinine: 0.80 mg/dL (06-21-25 @ 04:51)  Creatinine: 0.83 mg/dL (06-19-25 @ 06:26)  Creatinine: 0.83 mg/dL (06-18-25 @ 06:32)  Creatinine: 0.91 mg/dL (06-17-25 @ 06:19)  Creatinine: 0.84 mg/dL (06-16-25 @ 05:55)      LIVER FUNCTIONS - ( 21 Jun 2025 04:51 )  Alb: 3.5 g/dL / Pro: 6.6 g/dL / ALK PHOS: 147 U/L / ALT: 41 U/L / AST: 37 U/L / GGT: x           Microbiology: reviewed   Culture - Urine (collected 06-09-25 @ 18:15)  Source: Clean Catch Clean Catch (Midstream)  Final Report (06-10-25 @ 23:11):    <10,000 CFU/mL Normal Urogenital Kristen    Radiology: reviewed     Medications:  acetaminophen     Tablet .. 650 milliGRAM(s) Oral every 6 hours PRN  aluminum hydroxide/magnesium hydroxide/simethicone Suspension 30 milliLiter(s) Oral every 4 hours PRN  amLODIPine   Tablet 5 milliGRAM(s) Oral daily  artificial tears (preservative free) Ophthalmic Solution 1 Drop(s) Both EYES four times a day  atorvastatin 10 milliGRAM(s) Oral at bedtime  chlorhexidine 2% Cloths 1 Application(s) Topical <User Schedule>  enoxaparin Injectable 40 milliGRAM(s) SubCutaneous every 24 hours  melatonin 3 milliGRAM(s) Oral at bedtime  nystatin Powder 1 Application(s) Topical three times a day  OLANZapine 2.5 milliGRAM(s) Oral at bedtime  OLANZapine 2.5 milliGRAM(s) Oral every 6 hours PRN  ondansetron Injectable 4 milliGRAM(s) IV Push every 8 hours PRN
Neurology Progress Note    INTERVAL HISTORY:  no acute events  Daughter says patient this morning seems much more normally conversant  Daughter re-iterating that patient has complained of double and triple vision last few days  Patient more calm, coherent, and partly oriented today  Still somewhat somnolent, exam remains non-focal    REVIEW OF SYSTEMS: Otherwise denies fever, chills, headaches, vision changes, blurry vision, double vision, nausea, vomiting, hearing change, focal weakness, focal numbness, parasthesias, bowel/ bladder incontinence.  Few questions of a 10-system ROS was performed and is negative except for those items noted above and/or in the HPI.    VITALS & EXAMINATION:  Vital Signs Last 24 Hrs  T(C): 36.8 (12 Jun 2025 09:11), Max: 37.1 (12 Jun 2025 06:30)  T(F): 98.3 (12 Jun 2025 09:11), Max: 98.7 (12 Jun 2025 06:30)  HR: 72 (12 Jun 2025 09:11) (72 - 85)  BP: 170/77 (12 Jun 2025 09:11) (137/60 - 170/77)  BP(mean): --  RR: 18 (12 Jun 2025 09:11) (17 - 18)  SpO2: 100% (12 Jun 2025 09:11) (97% - 100%)    Parameters below as of 12 Jun 2025 06:30  Patient On (Oxygen Delivery Method): room air        General:  Constitutional: Male, appears stated age, nontoxic, not in distress,    Head: Normocephalic;   Eyes: clear sclera;   Extremities: No cyanosis;   Resp: breathing comfortably  Neck: no nuchal rigidity     Neurological (>12):    easily wakes up to voice + light tactile  today able to state his name and that he is in hospital, [facility]  Oriented to month, year  identifies daughter at bedside  Still somewhat somnolent and inattentive  Names items in [place] of [place] liek glasses, watch  Did not attend enough for repetition of full sentence  Follows simple appendicular commands    PERRL, EOM full, gaze seems conjugate  VFF grossly intact to finger counting  Face grossly symmetric  Moving all extremities at least antigravity  Reaches for ceiling symmetrically  Reacts to tactile in all limbs  Not obviously ataxic when reaching for my  hand  Gait deferred    LABORATORY:  CBC                       16.3   7.18  )-----------( 117      ( 12 Jun 2025 05:35 )             48.5     Chem 06-12    142  |  105  |  17  ----------------------------<  88  3.7   |  23  |  0.91    Ca    9.4      12 Jun 2025 05:35  Phos  3.1     06-12  Mg     2.20     06-12    TPro  6.8  /  Alb  x   /  TBili  x   /  DBili  x   /  AST  x   /  ALT  x   /  AlkPhos  x   06-12    LFTs LIVER FUNCTIONS - ( 12 Jun 2025 05:35 )  Alb: x     / Pro: 6.8 g/dL / ALK PHOS: x     / ALT: x     / AST: x     / GGT: x           CBC normal  BMP normal  Hepatic normal  TSH T4 normal  UA negative  RVP negative  B12 and folate normal  U tox negative    U/A Urinalysis Basic - ( 12 Jun 2025 05:35 )    Color: x / Appearance: x / SG: x / pH: x  Gluc: 88 mg/dL / Ketone: x  / Bili: x / Urobili: x   Blood: x / Protein: x / Nitrite: x   Leuk Esterase: x / RBC: x / WBC x   Sq Epi: x / Non Sq Epi: x / Bacteria: x      STUDIES & IMAGING: (EEG, CT, MR, U/S, TTE/CELIA):    CT head:  FINDINGS:  There is periventricular and subcortical white matter hypodensity without mass effect, nonspecific, likely representing mild chronic microvascular ischemic changes. There is no compelling evidence for an acute transcortical infarction. There is no evidence of mass, mass effect, midline shift or extra-axial fluid collection. The lateral ventricles and cortical sulci are age-appropriate in size and configuration. The orbits, mastoid air cells and visualized paranasal sinuses are unremarkable. The calvarium is intact. Consider MRI as clinically warranted.  IMPRESSION: Mild chronic microvascular changes without evidence of an acute transcortical infarction or hemorrhage.      MRI brain 6/12:  Official read pending  No acute infarct visible  Some prominent sulci in temporal regions around sylvian fissures as well as prominent sulci parietal regions  Somewhat dilated temporal horn of lateral ventricle could indicate some hippocampal atrophy
Patient is a 84y old  Male who presents with a chief complaint of hallucinations, unsafe disposition (10 Hayder 2025 13:00)    Date of servie : 06-10-25 @ 17:57  INTERVAL HPI/OVERNIGHT EVENTS:  T(C): 36.9 (06-10-25 @ 17:26), Max: 36.9 (06-10-25 @ 17:26)  HR: 85 (06-10-25 @ 17:26) (74 - 92)  BP: 160/72 (06-10-25 @ 17:26) (125/55 - 160/72)  RR: 20 (06-10-25 @ 17:26) (18 - 20)  SpO2: 100% (06-10-25 @ 17:26) (95% - 100%)  Wt(kg): --  I&O's Summary    09 Jun 2025 07:01  -  10 Hayder 2025 07:00  --------------------------------------------------------  IN: 200 mL / OUT: 400 mL / NET: -200 mL        LABS:                        14.6   6.89  )-----------( 171      ( 10 Hayder 2025 06:00 )             43.9     06-10    143  |  106  |  21  ----------------------------<  108[H]  3.5   |  24  |  0.96    Ca    9.3      10 Hayder 2025 06:00  Phos  2.3     06-10  Mg     2.00     06-10    TPro  7.8  /  Alb  4.7  /  TBili  0.8  /  DBili  x   /  AST  32  /  ALT  34  /  AlkPhos  118  06-09      Urinalysis Basic - ( 10 Hayder 2025 06:00 )    Color: x / Appearance: x / SG: x / pH: x  Gluc: 108 mg/dL / Ketone: x  / Bili: x / Urobili: x   Blood: x / Protein: x / Nitrite: x   Leuk Esterase: x / RBC: x / WBC x   Sq Epi: x / Non Sq Epi: x / Bacteria: x      CAPILLARY BLOOD GLUCOSE            Urinalysis Basic - ( 10 Hayder 2025 06:00 )    Color: x / Appearance: x / SG: x / pH: x  Gluc: 108 mg/dL / Ketone: x  / Bili: x / Urobili: x   Blood: x / Protein: x / Nitrite: x   Leuk Esterase: x / RBC: x / WBC x   Sq Epi: x / Non Sq Epi: x / Bacteria: x        MEDICATIONS  (STANDING):  amLODIPine   Tablet 5 milliGRAM(s) Oral daily  atorvastatin 10 milliGRAM(s) Oral at bedtime  chlorhexidine 2% Cloths 1 Application(s) Topical <User Schedule>  enoxaparin Injectable 40 milliGRAM(s) SubCutaneous every 24 hours  potassium phosphate IVPB 15 milliMole(s) IV Intermittent once    MEDICATIONS  (PRN):  acetaminophen     Tablet .. 650 milliGRAM(s) Oral every 6 hours PRN Temp greater or equal to 38C (100.4F), Mild Pain (1 - 3)  aluminum hydroxide/magnesium hydroxide/simethicone Suspension 30 milliLiter(s) Oral every 4 hours PRN Dyspepsia  melatonin 3 milliGRAM(s) Oral at bedtime PRN Insomnia  ondansetron Injectable 4 milliGRAM(s) IV Push every 8 hours PRN Nausea and/or Vomiting          PHYSICAL EXAM:  GENERAL: confused   CHEST/LUNG: Clear to percussion bilaterally; No rales, rhonchi, wheezing, or rubs  HEART: Regular rate and rhythm; No murmurs, rubs, or gallops  ABDOMEN: Soft, Nontender, Nondistended; Bowel sounds present  EXTREMITIES: edema +    Care Discussed with Consultants/Other Providers [ ] YES  [ ] NO
Patient is a 84y old  Male who presents with a chief complaint of hallucinations, unsafe disposition (14 Jun 2025 12:22)    Date of servie : 06-14-25 @ 17:27  INTERVAL HPI/OVERNIGHT EVENTS:  T(C): 36.4 (06-14-25 @ 17:08), Max: 37 (06-13-25 @ 22:16)  HR: 66 (06-14-25 @ 17:08) (65 - 83)  BP: 148/67 (06-14-25 @ 17:08) (125/55 - 166/71)  RR: 18 (06-14-25 @ 17:08) (17 - 18)  SpO2: 99% (06-14-25 @ 17:08) (94% - 99%)  Wt(kg): --  I&O's Summary    13 Jun 2025 07:01  -  14 Jun 2025 07:00  --------------------------------------------------------  IN: 490 mL / OUT: 600 mL / NET: -110 mL        LABS:    06-14    144  |  107  |  27[H]  ----------------------------<  104[H]  3.6   |  24  |  0.98    Ca    9.5      14 Jun 2025 06:13  Phos  2.7     06-14  Mg     2.30     06-14        Urinalysis Basic - ( 14 Jun 2025 06:13 )    Color: x / Appearance: x / SG: x / pH: x  Gluc: 104 mg/dL / Ketone: x  / Bili: x / Urobili: x   Blood: x / Protein: x / Nitrite: x   Leuk Esterase: x / RBC: x / WBC x   Sq Epi: x / Non Sq Epi: x / Bacteria: x      CAPILLARY BLOOD GLUCOSE            Urinalysis Basic - ( 14 Jun 2025 06:13 )    Color: x / Appearance: x / SG: x / pH: x  Gluc: 104 mg/dL / Ketone: x  / Bili: x / Urobili: x   Blood: x / Protein: x / Nitrite: x   Leuk Esterase: x / RBC: x / WBC x   Sq Epi: x / Non Sq Epi: x / Bacteria: x        MEDICATIONS  (STANDING):  amLODIPine   Tablet 5 milliGRAM(s) Oral daily  artificial tears (preservative free) Ophthalmic Solution 1 Drop(s) Both EYES four times a day  atorvastatin 10 milliGRAM(s) Oral at bedtime  chlorhexidine 2% Cloths 1 Application(s) Topical <User Schedule>  enoxaparin Injectable 40 milliGRAM(s) SubCutaneous every 24 hours  melatonin 3 milliGRAM(s) Oral at bedtime  OLANZapine 2.5 milliGRAM(s) Oral at bedtime    MEDICATIONS  (PRN):  acetaminophen     Tablet .. 650 milliGRAM(s) Oral every 6 hours PRN Temp greater or equal to 38C (100.4F), Mild Pain (1 - 3)  aluminum hydroxide/magnesium hydroxide/simethicone Suspension 30 milliLiter(s) Oral every 4 hours PRN Dyspepsia  OLANZapine 2.5 milliGRAM(s) Oral every 6 hours PRN agitation  ondansetron Injectable 4 milliGRAM(s) IV Push every 8 hours PRN Nausea and/or Vomiting          PHYSICAL EXAM:  GENERAL: frail  CHEST/LUNG: Clear to percussion bilaterally; No rales, rhonchi, wheezing, or rubs  HEART: Regular rate and rhythm; No murmurs, rubs, or gallops  ABDOMEN: Soft, Nontender, Nondistended; Bowel sounds present  EXTREMITIES:  edema +    Care Discussed with Consultants/Other Providers [ ] YES  [ ] NO
Patient is a 84y old  Male who presents with a chief complaint of hallucinations, unsafe disposition (20 Jun 2025 11:53)    Date of servie : 06-20-25 @ 16:37  INTERVAL HPI/OVERNIGHT EVENTS:  T(C): 36.6 (06-20-25 @ 09:59), Max: 37.1 (06-19-25 @ 17:18)  HR: 70 (06-20-25 @ 09:59) (70 - 89)  BP: 149/64 (06-20-25 @ 09:59) (134/60 - 152/77)  RR: 17 (06-20-25 @ 09:59) (17 - 19)  SpO2: 96% (06-20-25 @ 09:59) (96% - 98%)  Wt(kg): --  I&O's Summary    19 Jun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

## 2025-06-25 NOTE — DISCHARGE NOTE PROVIDER - CARE PROVIDER_API CALL
Vidal Ryan  Internal Medicine  [address], [address]  [place] [address]  Phone: [phone]  Fax: [phone]  Follow Up Time:     Roman Chen  Neurology  [address]  [place] [address]  Phone: [phone]  Fax: [phone]  Follow Up Time:    Vidal Ryan  Internal Medicine  [address], [address]  [place] [address]  Phone: [phone]  Fax: [phone]  Follow Up Time:     Roman Chen  Neurology  [address], [address]  [place] [address]  Phone: [phone]  Fax: [phone]  Follow Up Time:     Noel Lewis  Neurology  [address] [place] [address]  Phone: [phone]  Fax: [phone]  Follow Up Time:     Rafael Gore  Cardiovascular Disease  [address], [address]  [place] [address]  Phone: [phone]  Fax: [phone]  Follow Up Time:

## 2025-07-02 LAB
LRP4 AUTOANTIBODY TEST: SIGNIFICANT CHANGE UP
LRP4 AUTOANTIBODY TEST: SIGNIFICANT CHANGE UP
MUSK IGG SER IA-MCNC: SIGNIFICANT CHANGE UP
MUSK IGG SER IA-MCNC: SIGNIFICANT CHANGE UP

## 2025-07-07 ENCOUNTER — INPATIENT (INPATIENT)
Facility: HOSPITAL | Age: 85
LOS: 7 days | Discharge: NURSING, REHAB OR LONG-TERM CARE | DRG: 394 | End: 2025-07-15
Attending: INTERNAL MEDICINE | Admitting: INTERNAL MEDICINE
Payer: MEDICARE

## 2025-07-07 VITALS
SYSTOLIC BLOOD PRESSURE: 125 MMHG | TEMPERATURE: 98 F | OXYGEN SATURATION: 96 % | HEIGHT: 66 IN | WEIGHT: 184.97 LBS | RESPIRATION RATE: 18 BRPM | DIASTOLIC BLOOD PRESSURE: 69 MMHG | HEART RATE: 89 BPM

## 2025-07-07 DIAGNOSIS — K62.89 OTHER SPECIFIED DISEASES OF ANUS AND RECTUM: ICD-10-CM

## 2025-07-07 DIAGNOSIS — K08.409 PARTIAL LOSS OF TEETH, UNSPECIFIED CAUSE, UNSPECIFIED CLASS: Chronic | ICD-10-CM

## 2025-07-07 LAB
ALBUMIN SERPL ELPH-MCNC: 3.5 G/DL — SIGNIFICANT CHANGE UP (ref 3.3–5)
ALP SERPL-CCNC: 125 U/L — HIGH (ref 40–120)
ALT FLD-CCNC: 40 U/L — SIGNIFICANT CHANGE UP (ref 12–78)
ANION GAP SERPL CALC-SCNC: 5 MMOL/L — SIGNIFICANT CHANGE UP (ref 5–17)
APPEARANCE UR: CLEAR — SIGNIFICANT CHANGE UP
AST SERPL-CCNC: 27 U/L — SIGNIFICANT CHANGE UP (ref 15–37)
BASOPHILS # BLD AUTO: 0.01 K/UL — SIGNIFICANT CHANGE UP (ref 0–0.2)
BASOPHILS NFR BLD AUTO: 0.1 % — SIGNIFICANT CHANGE UP (ref 0–2)
BILIRUB SERPL-MCNC: 0.7 MG/DL — SIGNIFICANT CHANGE UP (ref 0.2–1.2)
BILIRUB UR-MCNC: NEGATIVE — SIGNIFICANT CHANGE UP
BUN SERPL-MCNC: 19 MG/DL — SIGNIFICANT CHANGE UP (ref 7–23)
CALCIUM SERPL-MCNC: 9.1 MG/DL — SIGNIFICANT CHANGE UP (ref 8.5–10.1)
CHLORIDE SERPL-SCNC: 103 MMOL/L — SIGNIFICANT CHANGE UP (ref 96–108)
CO2 SERPL-SCNC: 27 MMOL/L — SIGNIFICANT CHANGE UP (ref 22–31)
COLOR SPEC: YELLOW — SIGNIFICANT CHANGE UP
CREAT SERPL-MCNC: 0.9 MG/DL — SIGNIFICANT CHANGE UP (ref 0.5–1.3)
DIFF PNL FLD: NEGATIVE — SIGNIFICANT CHANGE UP
EGFR: 84 ML/MIN/1.73M2 — SIGNIFICANT CHANGE UP
EGFR: 84 ML/MIN/1.73M2 — SIGNIFICANT CHANGE UP
EOSINOPHIL # BLD AUTO: 0.02 K/UL — SIGNIFICANT CHANGE UP (ref 0–0.5)
EOSINOPHIL NFR BLD AUTO: 0.2 % — SIGNIFICANT CHANGE UP (ref 0–6)
GLUCOSE SERPL-MCNC: 120 MG/DL — HIGH (ref 70–99)
GLUCOSE UR QL: NEGATIVE MG/DL — SIGNIFICANT CHANGE UP
HCT VFR BLD CALC: 45.4 % — SIGNIFICANT CHANGE UP (ref 39–50)
HGB BLD-MCNC: 15 G/DL — SIGNIFICANT CHANGE UP (ref 13–17)
IMM GRANULOCYTES # BLD AUTO: 0.04 K/UL — SIGNIFICANT CHANGE UP (ref 0–0.07)
IMM GRANULOCYTES NFR BLD AUTO: 0.3 % — SIGNIFICANT CHANGE UP (ref 0–0.9)
KETONES UR QL: NEGATIVE MG/DL — SIGNIFICANT CHANGE UP
LEUKOCYTE ESTERASE UR-ACNC: NEGATIVE — SIGNIFICANT CHANGE UP
LIDOCAIN IGE QN: 36 U/L — SIGNIFICANT CHANGE UP (ref 13–75)
LYMPHOCYTES # BLD AUTO: 1.32 K/UL — SIGNIFICANT CHANGE UP (ref 1–3.3)
LYMPHOCYTES NFR BLD AUTO: 10.7 % — LOW (ref 13–44)
MCHC RBC-ENTMCNC: 27.3 PG — SIGNIFICANT CHANGE UP (ref 27–34)
MCHC RBC-ENTMCNC: 33 G/DL — SIGNIFICANT CHANGE UP (ref 32–36)
MCV RBC AUTO: 82.7 FL — SIGNIFICANT CHANGE UP (ref 80–100)
MONOCYTES # BLD AUTO: 0.41 K/UL — SIGNIFICANT CHANGE UP (ref 0–0.9)
MONOCYTES NFR BLD AUTO: 3.3 % — SIGNIFICANT CHANGE UP (ref 2–14)
NEUTROPHILS # BLD AUTO: 10.51 K/UL — HIGH (ref 1.8–7.4)
NEUTROPHILS NFR BLD AUTO: 85.4 % — HIGH (ref 43–77)
NITRITE UR-MCNC: NEGATIVE — SIGNIFICANT CHANGE UP
NRBC # BLD AUTO: 0 K/UL — SIGNIFICANT CHANGE UP (ref 0–0)
NRBC # FLD: 0 K/UL — SIGNIFICANT CHANGE UP (ref 0–0)
NRBC BLD AUTO-RTO: 0 /100 WBCS — SIGNIFICANT CHANGE UP (ref 0–0)
PH UR: 7 — SIGNIFICANT CHANGE UP (ref 5–8)
PLATELET # BLD AUTO: 229 K/UL — SIGNIFICANT CHANGE UP (ref 150–400)
PMV BLD: 9.4 FL — SIGNIFICANT CHANGE UP (ref 7–13)
POTASSIUM SERPL-MCNC: 4.5 MMOL/L — SIGNIFICANT CHANGE UP (ref 3.5–5.3)
POTASSIUM SERPL-SCNC: 4.5 MMOL/L — SIGNIFICANT CHANGE UP (ref 3.5–5.3)
PROT SERPL-MCNC: 7 G/DL — SIGNIFICANT CHANGE UP (ref 6–8.3)
PROT UR-MCNC: NEGATIVE MG/DL — SIGNIFICANT CHANGE UP
RBC # BLD: 5.49 M/UL — SIGNIFICANT CHANGE UP (ref 4.2–5.8)
RBC # FLD: 13.5 % — SIGNIFICANT CHANGE UP (ref 10.3–14.5)
SODIUM SERPL-SCNC: 135 MMOL/L — SIGNIFICANT CHANGE UP (ref 135–145)
SP GR SPEC: 1 — SIGNIFICANT CHANGE UP (ref 1–1.03)
UROBILINOGEN FLD QL: 1 MG/DL — SIGNIFICANT CHANGE UP (ref 0.2–1)
WBC # BLD: 12.31 K/UL — HIGH (ref 3.8–10.5)
WBC # FLD AUTO: 12.31 K/UL — HIGH (ref 3.8–10.5)

## 2025-07-07 PROCEDURE — 74177 CT ABD & PELVIS W/CONTRAST: CPT | Mod: 26

## 2025-07-07 PROCEDURE — 85025 COMPLETE CBC W/AUTO DIFF WBC: CPT

## 2025-07-07 PROCEDURE — 36415 COLL VENOUS BLD VENIPUNCTURE: CPT

## 2025-07-07 PROCEDURE — 99285 EMERGENCY DEPT VISIT HI MDM: CPT

## 2025-07-07 PROCEDURE — 74177 CT ABD & PELVIS W/CONTRAST: CPT

## 2025-07-07 PROCEDURE — 83690 ASSAY OF LIPASE: CPT

## 2025-07-07 PROCEDURE — 81003 URINALYSIS AUTO W/O SCOPE: CPT

## 2025-07-07 PROCEDURE — 80053 COMPREHEN METABOLIC PANEL: CPT

## 2025-07-07 RX ORDER — ACETAMINOPHEN 500 MG/5ML
1000 LIQUID (ML) ORAL ONCE
Refills: 0 | Status: COMPLETED | OUTPATIENT
Start: 2025-07-07 | End: 2025-07-07

## 2025-07-07 RX ORDER — ATORVASTATIN CALCIUM 80 MG/1
10 TABLET, FILM COATED ORAL AT BEDTIME
Refills: 0 | Status: DISCONTINUED | OUTPATIENT
Start: 2025-07-07 | End: 2025-07-15

## 2025-07-07 RX ORDER — KETOROLAC TROMETHAMINE 30 MG/ML
15 INJECTION, SOLUTION INTRAMUSCULAR; INTRAVENOUS ONCE
Refills: 0 | Status: DISCONTINUED | OUTPATIENT
Start: 2025-07-07 | End: 2025-07-07

## 2025-07-07 RX ORDER — MELATONIN 5 MG
3 TABLET ORAL AT BEDTIME
Refills: 0 | Status: DISCONTINUED | OUTPATIENT
Start: 2025-07-07 | End: 2025-07-11

## 2025-07-07 RX ORDER — ACETAMINOPHEN 500 MG/5ML
650 LIQUID (ML) ORAL EVERY 6 HOURS
Refills: 0 | Status: DISCONTINUED | OUTPATIENT
Start: 2025-07-07 | End: 2025-07-15

## 2025-07-07 RX ORDER — SENNA 187 MG
2 TABLET ORAL AT BEDTIME
Refills: 0 | Status: DISCONTINUED | OUTPATIENT
Start: 2025-07-07 | End: 2025-07-15

## 2025-07-07 RX ORDER — ONDANSETRON HCL/PF 4 MG/2 ML
1 VIAL (ML) INJECTION
Qty: 9 | Refills: 0
Start: 2025-07-07 | End: 2025-07-09

## 2025-07-07 RX ORDER — OLANZAPINE 10 MG/1
2.5 TABLET ORAL AT BEDTIME
Refills: 0 | Status: DISCONTINUED | OUTPATIENT
Start: 2025-07-07 | End: 2025-07-11

## 2025-07-07 RX ORDER — ENOXAPARIN SODIUM 100 MG/ML
40 INJECTION SUBCUTANEOUS EVERY 24 HOURS
Refills: 0 | Status: DISCONTINUED | OUTPATIENT
Start: 2025-07-07 | End: 2025-07-15

## 2025-07-07 RX ORDER — PIPERACILLIN-TAZO-DEXTROSE,ISO 3.375G/5
3.38 IV SOLUTION, PIGGYBACK PREMIX FROZEN(ML) INTRAVENOUS EVERY 8 HOURS
Refills: 0 | Status: DISCONTINUED | OUTPATIENT
Start: 2025-07-07 | End: 2025-07-07

## 2025-07-07 RX ORDER — MAGNESIUM CITRATE
296 SOLUTION, ORAL ORAL ONCE
Refills: 0 | Status: COMPLETED | OUTPATIENT
Start: 2025-07-07 | End: 2025-07-07

## 2025-07-07 RX ORDER — AMLODIPINE BESYLATE 10 MG/1
5 TABLET ORAL DAILY
Refills: 0 | Status: DISCONTINUED | OUTPATIENT
Start: 2025-07-07 | End: 2025-07-15

## 2025-07-07 RX ORDER — SALINE 7; 19 G/118ML; G/118ML
1 ENEMA RECTAL ONCE
Refills: 0 | Status: COMPLETED | OUTPATIENT
Start: 2025-07-07 | End: 2025-07-07

## 2025-07-07 RX ORDER — ONDANSETRON HCL/PF 4 MG/2 ML
4 VIAL (ML) INJECTION ONCE
Refills: 0 | Status: COMPLETED | OUTPATIENT
Start: 2025-07-07 | End: 2025-07-07

## 2025-07-07 RX ORDER — POLYETHYLENE GLYCOL 3350 17 G/17G
17 POWDER, FOR SOLUTION ORAL DAILY
Refills: 0 | Status: DISCONTINUED | OUTPATIENT
Start: 2025-07-07 | End: 2025-07-15

## 2025-07-07 RX ORDER — ONDANSETRON HCL/PF 4 MG/2 ML
4 VIAL (ML) INJECTION THREE TIMES A DAY
Refills: 0 | Status: DISCONTINUED | OUTPATIENT
Start: 2025-07-07 | End: 2025-07-15

## 2025-07-07 RX ORDER — PIPERACILLIN-TAZO-DEXTROSE,ISO 3.375G/5
3.38 IV SOLUTION, PIGGYBACK PREMIX FROZEN(ML) INTRAVENOUS ONCE
Refills: 0 | Status: COMPLETED | OUTPATIENT
Start: 2025-07-07 | End: 2025-07-07

## 2025-07-07 RX ADMIN — POLYETHYLENE GLYCOL 3350 17 GRAM(S): 17 POWDER, FOR SOLUTION ORAL at 12:52

## 2025-07-07 RX ADMIN — Medication 200 GRAM(S): at 06:33

## 2025-07-07 RX ADMIN — OLANZAPINE 2.5 MILLIGRAM(S): 10 TABLET ORAL at 22:23

## 2025-07-07 RX ADMIN — Medication 1000 MILLILITER(S): at 04:43

## 2025-07-07 RX ADMIN — KETOROLAC TROMETHAMINE 15 MILLIGRAM(S): 30 INJECTION, SOLUTION INTRAMUSCULAR; INTRAVENOUS at 04:46

## 2025-07-07 RX ADMIN — SALINE 1 ENEMA: 7; 19 ENEMA RECTAL at 04:10

## 2025-07-07 RX ADMIN — ATORVASTATIN CALCIUM 10 MILLIGRAM(S): 80 TABLET, FILM COATED ORAL at 22:22

## 2025-07-07 RX ADMIN — ENOXAPARIN SODIUM 40 MILLIGRAM(S): 100 INJECTION SUBCUTANEOUS at 12:52

## 2025-07-07 RX ADMIN — Medication 25 GRAM(S): at 16:55

## 2025-07-07 RX ADMIN — Medication 4 MILLIGRAM(S): at 22:23

## 2025-07-07 RX ADMIN — Medication 400 MILLIGRAM(S): at 05:13

## 2025-07-07 RX ADMIN — Medication 20 MILLIGRAM(S): at 04:44

## 2025-07-07 RX ADMIN — Medication 3 MILLIGRAM(S): at 22:23

## 2025-07-07 RX ADMIN — Medication 2 TABLET(S): at 22:23

## 2025-07-07 RX ADMIN — Medication 4 MILLIGRAM(S): at 04:46

## 2025-07-07 NOTE — CARE COORDINATION ASSESSMENT. - NSPASTMEDSURGHISTORY_GEN_ALL_CORE_FT
PAST MEDICAL & SURGICAL HISTORY:  Hyperlipidemia      HTN (hypertension)      [place] (hard of hearing)      History of wisdom tooth extraction

## 2025-07-07 NOTE — H&P ADULT - NSHPPHYSICALEXAM_GEN_ALL_CORE
General:  frail  PERRLA  Neurology: A&Ox 1  Respiratory: CTA B/L  CV: S1S2+  Abdominal: Soft, NT, ND +BS, Last BM  Extremities: edema+

## 2025-07-07 NOTE — CARE COORDINATION ASSESSMENT. - NSTRANSPORTNEEDS_GEN_ALL_CORE
· Patient has history diverticulitis complicated by perforation status post colostomy  · Outpatient follow-up Ambulance/[facility]

## 2025-07-07 NOTE — CARE COORDINATION ASSESSMENT. - OTHER PERTINENT REFERRAL INFORMATION
Sw met with Pt at bedside. Pt is A & Ox4 and able to make his needs known. Sw introduced self and role and pt expressed verbal understanding. Pt was at [facility] from 6/9-6/25 and was discharged to [facility] for MELCHOR. prior to that pt lived alone  with 2 YADIRA and was indep PTA. Pt has family close by that can assist. Plan is mostly likely back to [facility] for cont MELCHOR when he is done with his hospital stay.  PCP: Vidal Ryan [phone] Phamr: LAURA #[phone]

## 2025-07-07 NOTE — ED ADULT NURSE NOTE - PAIN: BODY LOCATION
Bed in lowest position, wheels locked, appropriate side rails in place/Call bell, personal items and telephone in reach/Instruct patient to call for assistance before getting out of bed or chair/Non-slip footwear when patient is out of bed/[place] to call system/Physically safe environment - no spills, clutter or unnecessary equipment/Purposeful Proactive Rounding/Room/bathroom lighting operational, light cord in reach rectum

## 2025-07-07 NOTE — ED ADULT NURSE NOTE - NSFALLRISKINTERV_ED_ALL_ED

## 2025-07-07 NOTE — ED ADULT NURSE NOTE - OBJECTIVE STATEMENT
84 y.o male presents to ED with chief complaint of abd pain. Pt complains of pain in the rectum and abd pain. Pt confirms constipation and nausea. Pt denies any vomiting.

## 2025-07-07 NOTE — H&P ADULT - HISTORY OF PRESENT ILLNESS
84M PMH HTN, HLD, Basal Cell Carcinoma (of forehead s/p excision) BIBEMS from [facility] for diffuse abd pain and constipation. has not had a BM in a few days. Does have hx of constipation. Pt also endorsing rectal pain

## 2025-07-07 NOTE — CONSULT NOTE ADULT - ASSESSMENT
fecal impaction  constipation    large fecal load  s/p disimpaction  CT noted  mg citrate times one  monitor GI fn  will follow

## 2025-07-07 NOTE — ED ADULT NURSE NOTE - PATIENT'S PREFERRED PRONOUN
OPERATIVE NOTE    PREOP DIAGNOSIS:  Osteoarthritis  left hip    POST OP DIAGNOSIS:  Same    PROCEDURE:   Total hip arthroplasty, anterior approach    SURGEON:  Zach Lemos MD    ASSISTANT:  ADRIAN Liu    COMPLICATIONS:  None    BLOOD LOSS:  200 cc    IMPLANT: Acetabular shell: Hiawatha Gription size 56                    Femoral stem: Actis Duofix cementless size 4 high                    Femoral Head: size 40                    Liner: Altrx neutral size 58 od    PROCEDURE IN DETAIL:  Patient was brought to the operating room and administered a spinal anesthetic.  The patient was administered IV antibiotics and tranexamic acid IV.  Patient was positioned positioned on the [place] table.  Preoperative imaging was utilized looking at leg length, offset, and overall deformity of the  left hip.  An incision marker was used marking an anterior incision 7 cm in length originating 2 cm lateral and 2 cm distal from the ASIS extending down the anterolateral thigh.  After sterile prep and drape incision was made and a subcutaneous retractor was positioned.  The fascia was incised and tagged.  Blunt dissection was carried down to the muscular interval between the tensor fascia miki and the sartorius.  A blunt Cobra was placed over the superior femoral neck.  A 2nd Cobra retractor was placed over the inferior femoral neck.  Next an anterior capsulectomy was performed.  The anterior circumflex vessels were cauterized carefully with a Bovie.  The anterior labrum was also resected.  Next using imaging I marked the femoral neck cut level.  The femoral neck was transected with an oscillating saw.  Neck cut was confirmed using C-arm.  Next the leg was externally rotated with gentle traction exposed the femoral head and neck well.  A corkscrew was placed into the femoral head and it was extracted.  Next the acetabulum was exposed with retractors medially and laterally.  Another retractor was placed anteriorly directly over the  bone of the acetabulum.  Reaming commenced and I reamed the acetabulum down to the teardrop confirming this with C-arm imaging.  It was reamed until appropriately sized.  A porous-coated Granger cup, size 56 was selected then impacted and using navigation oriented at approximately 43° inclination and 23° anteversion.  Following this the high wall liner was impacted into the acetabular shell and confirmed to be seated looking at 2 tabs on the polyethylene.  Next the femur was exposed by externally rotating the leg and 80 duction.  A medial calcar retractor was placed as well as a capsule in a TTE a retractor behind the greater trochanter.  The femoral shaft was delivered into the wound for exposure and then all broach technique was utilized on the femoral side.  The box chisel was used followed by sequential broaching until good fill and fit was achieved.  The hip was reduced and taken through a process of imaging.  I confirmed that the femoral offset, leg length, and fill of the proximal femur was appropriate.  Following this the hip was dislocated and the final stem, size 4 high was impacted with good purchase.  A calcar Reamer was used.  Following insertion of the femoral stem,  a ceramic head, size 40 +5  was impacted carefully onto the dry trunnion and the hip was reduced for final time.  Stability was checked by externally rotating the leg gently up to 120° of external rotation with no anterior instability.  Following this the hip was injected with Marcaine and it was thoroughly lavaged.  A deep drain was positioned and the wound was closed with interrupted 1. Vicryl in the fascia.  A subcuticular closure was also done with a barbed suture and Exofin glue was used on the skin.  There were no complications.    Zach Lemos    Him/He

## 2025-07-07 NOTE — ED ADULT TRIAGE NOTE - CHIEF COMPLAINT QUOTE
BIBEMS from [facility] c/o worsening abd pain and rectal pain; no BM in 2 days,  No n/v/d.

## 2025-07-07 NOTE — CONSULT NOTE ADULT - SUBJECTIVE AND OBJECTIVE BOX
[facility]    Graeme Maureen Beauchamp NP    [address] [place] [address] [phone]      Chief Complaint:  Patient is a 84y old  Male who presents with a chief complaint of constipation (2025 11:37)      HPI:84M PMH HTN, HLD, Basal Cell Carcinoma (of forehead s/p excision) BIBEMS from [facility] for diffuse abd pain and constipation. has not had a BM in a few days. Does have hx of constipation. Pt also endorsing rectal pain    Allergies:  No Known Allergies      Medications:  acetaminophen     Tablet .. 650 milliGRAM(s) Oral every 6 hours PRN  amLODIPine   Tablet 5 milliGRAM(s) Oral daily  atorvastatin 10 milliGRAM(s) Oral at bedtime  enoxaparin Injectable 40 milliGRAM(s) SubCutaneous every 24 hours  melatonin 3 milliGRAM(s) Oral at bedtime  OLANZapine 2.5 milliGRAM(s) Oral at bedtime  ondansetron   Disintegrating Tablet 4 milliGRAM(s) Oral three times a day PRN  piperacillin/tazobactam IVPB.. 3.375 Gram(s) IV Intermittent every 8 hours  polyethylene glycol 3350 17 Gram(s) Oral daily  senna 2 Tablet(s) Oral at bedtime      PMHX/PSHX:  HTN (hypertension)    Hyperlipidemia    [place] (hard of hearing)    No significant past surgical history    History of wisdom tooth extraction        Family history:  No pertinent family history in first degree relatives    No pertinent family history in first degree relatives        Social History:     ROS:     General:  No wt loss, fevers, chills, night sweats, fatigue,   Eyes:  Good vision, no reported pain  ENT:  No sore throat, pain, runny nose, dysphagia  CV:  No pain, palpitations, hypo/hypertension  Resp:  No dyspnea, cough, tachypnea, wheezing  GI:  No pain, No nausea, No vomiting, No diarrhea, No constipation, No weight loss, No fever, No pruritis, No rectal bleeding, No tarry stools, No dysphagia,  :  No pain, bleeding, incontinence, nocturia  Muscle:  No pain, weakness  Neuro:  No weakness, tingling, memory problems  Psych:  No fatigue, insomnia, mood problems, depression  Endocrine:  No polyuria, polydipsia, cold/heat intolerance  Heme:  No petechiae, ecchymosis, easy bruisability  Skin:  No rash, tattoos, scars, edema      PHYSICAL EXAM:   Vital Signs:  Vital Signs Last 24 Hrs  T(C): 36.6 (2025 12:44), Max: 36.6 (2025 03:44)  T(F): 97.8 (2025 12:44), Max: 97.9 (2025 03:44)  HR: 68 (2025 12:44) (68 - 89)  BP: 149/72 (2025 12:44) (125/69 - 149/72)  BP(mean): --  RR: 18 (2025 12:44) (18 - 18)  SpO2: 98% (2025 12:44) (96% - 98%)    Parameters below as of 2025 08:27  Patient On (Oxygen Delivery Method): room air      Daily Height in cm: 167.64 (2025 03:44)    Daily     GENERAL:  Appears stated age, well-groomed, well-nourished, no distress  HEENT:  NC/AT,  conjunctivae clear and pink, no thyromegaly, nodules, adenopathy, no JVD, sclera -anicteric  CHEST:  Full & symmetric excursion, no increased effort, breath sounds clear  HEART:  Regular rhythm, S1, S2, no murmur/rub/S3/S4, no abdominal bruit, no edema  ABDOMEN:  Soft, non-tender, non-distended, normoactive bowel sounds,  no masses ,no hepato-splenomegaly, no signs of chronic liver disease  EXTEREMITIES:  no cyanosis,clubbing or edema  SKIN:  No rash/erythema/ecchymoses/petechiae/wounds/abscess/warm/dry  NEURO:  Alert, oriented, no asterixis, no tremor, no encephalopathy    LABS:                        15.0   12.31 )-----------( 229      ( 2025 04:45 )             45.4     07-07    135  |  103  |  19  ----------------------------<  120[H]  4.5   |  27  |  0.90    Ca    9.1      2025 04:45    TPro  7.0  /  Alb  3.5  /  TBili  0.7  /  DBili  x   /  AST  27  /  ALT  40  /  AlkPhos  125[H]  07-07    LIVER FUNCTIONS - ( 2025 04:45 )  Alb: 3.5 g/dL / Pro: 7.0 g/dL / ALK PHOS: 125 U/L / ALT: 40 U/L / AST: 27 U/L / GGT: x             Urinalysis Basic - ( 2025 04:45 )    Color: Yellow / Appearance: Clear / S.005 / pH: x  Gluc: 120 mg/dL / Ketone: x  / Bili: Negative / Urobili: 1.0 mg/dL   Blood: x / Protein: Negative mg/dL / Nitrite: Negative   Leuk Esterase: Negative / RBC: x / WBC x   Sq Epi: x / Non Sq Epi: x / Bacteria: x      Amylase Serum--      Lipase serum36       Ammonia--      Imaging:

## 2025-07-07 NOTE — H&P ADULT - ASSESSMENT
84M PMH HTN, HLD, Basal Cell Carcinoma (of forehead s/p excision) BIBEMS from [facility] for diffuse abd pain and constipation. has not had a BM in a few days. Does have hx of constipation. Pt also endorsing rectal pain    Stercolitis, constipation  - sp disimpaction  - cw bowel regimen  - clear liquid diet  - GI fu  - will defer abx to ID     HTN  - cw home meds  - DASH diet    HLD  - cw statin    DVT prophylaxis

## 2025-07-07 NOTE — CARE COORDINATION ASSESSMENT. - NSCAREPROVIDERS_GEN_ALL_CORE_FT
CARE PROVIDERS:  Accepting Physician: Jared Reed  Access Services: Burghill Ara  Administration: William Gray  Admitting: Jared Reed  Attending: Jared Reed  Consultant: Jennie Zazueta  Consultant: Cynthia Zuñiga  ED Attending: Mark Shane  ED Nurse: Harpreet Sánchez  Nurse: Claudia Leary  Nurse: Kathleen Lamb  PCA/Nursing Assistant: Lori Moody  : Jessica Ibrahim// Supp. Assoc.: Rolando Jean-Baptiste

## 2025-07-07 NOTE — PATIENT PROFILE ADULT - FALL HARM RISK - HARM RISK INTERVENTIONS
Assistance with ambulation/Assistance OOB with selected safe patient handling equipment/Communicate Risk of Fall with Harm to all staff/Discuss with provider need for PT consult/Monitor gait and stability/Reinforce activity limits and safety measures with patient and family/Tailored Fall Risk Interventions/Visual Cue: Yellow wristband and red socks/Bed in lowest position, wheels locked, appropriate side rails in place/Call bell, personal items and telephone in reach/Instruct patient to call for assistance before getting out of bed or chair/Non-slip footwear when patient is out of bed/[place] to call system/Physically safe environment - no spills, clutter or unnecessary equipment/Purposeful Proactive Rounding/Room/bathroom lighting operational, light cord in reach

## 2025-07-07 NOTE — CONSULT NOTE ADULT - ASSESSMENT
***THIS IS AN INCOMPLETE NOTE. CHARTING IN PROGRESS***    Patient evaluated with face-to-face time in addition to reviewing history, labs, microbiology, and imaging.   Antibiotic stewardship, local antibiogram, infection control strategies and potential transmission issues taken into consideration at time of treatment decision making process.   Thank you for allowing us to participate in the care of your patient.  Infectious Diseases will follow. Please call with any questions.  Jennie Zazueta M.D.  Available on [employer] TEAMS -- *[facility]*  [place] [facility] [phone]  For after 5 P.M. and weekends, please call [phone]   C/w zosyn for now    ***THIS IS AN INCOMPLETE NOTE. CHARTING IN PROGRESS***    Patient evaluated with face-to-face time in addition to reviewing history, labs, microbiology, and imaging.   Antibiotic stewardship, local antibiogram, infection control strategies and potential transmission issues taken into consideration at time of treatment decision making process.   Thank you for allowing us to participate in the care of your patient.  Infectious Diseases will follow. Please call with any questions.  Jennie Zazueta M.D.  Available on [employer] TEAMS -- *[facility]*  [place] [facility] [phone]  For after 5 P.M. and weekends, please call [phone] 84M PMH HTN, HLD, Basal Cell Carcinoma (of forehead s/p excision) BIBEMS from [facility] for diffuse abd pain and constipation. has not had a BM in a few days. Does have hx of constipation. Pt also endorsing rectal pain  CT showing Acute proctitis. Punctate focus of gas in the urinary bladder. UA clear. Afebrile, leukocytosis to 12  S/p disimpaction in the ED. S/p zosyn x1 dose in the ED    Recommendations:   Suspect proctitis, inflammation 2/2 constipation, now relieved  Monitor off Abx  Trend temps/WBC  GI following  Additional care per primary team    Patient evaluated with face-to-face time in addition to reviewing history, labs, microbiology, and imaging.   Antibiotic stewardship, local antibiogram, infection control strategies and potential transmission issues taken into consideration at time of treatment decision making process.   Thank you for allowing us to participate in the care of your patient.  Infectious Diseases will follow. Please call with any questions.  Jennie Zazueta M.D.  Available on [employer] TEAMS -- *[facility]*  [place] [facility] [phone]  For after 5 P.M. and weekends, please call [phone]

## 2025-07-07 NOTE — ED PROVIDER NOTE - PROGRESS NOTE DETAILS
CT shows proctitis with WBC 12. I disimpacted pt but he was not able to have a large BM with the enema. Will order Zosyn and endorsed to dr INCK Reed for nontele admission. pt and daughter updated and are amenable for admission

## 2025-07-07 NOTE — ED PROVIDER NOTE - CLINICAL SUMMARY MEDICAL DECISION MAKING FREE TEXT BOX
84M PMH HTN, HLD, Basal Cell Carcinoma (of forehead s/p excision) BIBEMS from [facility] for diffuse abd pain and constipation. has not had a BM in a few days. Does have hx of constipation. Pt also endorsing rectal pain    Gen: NAD, non-toxic appearing, awake alert   HEENT:  normal conjunctiva, oral mucosa moist  Lung: CTAB, no respiratory distress, no wheezes/rhonchi/rales B/L, speaking in full sentences  CV: RRR  Abd: soft, minimal diffuse abd TTP, ND, no guarding, no rigidity, no rebound tenderness  MSK: no visible deformities  Skin: Warm, well perfused    DDx includes but not limited to: sbo vs constipation vs pedro vs lyte derangements vs uti  Plan: abd labs, ct, reassess symptoms    See Progress Notes for further updates on ED Course

## 2025-07-07 NOTE — CONSULT NOTE ADULT - SUBJECTIVE AND OBJECTIVE BOX
[facility]  SHELLY Alvarado S. Shah, Y. Patel, G. [facility]  [phone]    MILO CARDONA  84y, Male  [id number]    HPI--  HPI:  84M PMH HTN, HLD, Basal Cell Carcinoma (of forehead s/p excision) BIBEMS from [facility] for diffuse abd pain and constipation. has not had a BM in a few days. Does have hx of constipation. Pt also endorsing rectal pain (2025 07:38)        Active Medications--  acetaminophen     Tablet .. 650 milliGRAM(s) Oral every 6 hours PRN  amLODIPine   Tablet 5 milliGRAM(s) Oral daily  atorvastatin 10 milliGRAM(s) Oral at bedtime  enoxaparin Injectable 40 milliGRAM(s) SubCutaneous every 24 hours  melatonin 3 milliGRAM(s) Oral at bedtime  OLANZapine 2.5 milliGRAM(s) Oral at bedtime  ondansetron   Disintegrating Tablet 4 milliGRAM(s) Oral three times a day PRN  polyethylene glycol 3350 17 Gram(s) Oral daily  senna 2 Tablet(s) Oral at bedtime    Antimicrobials:     Immunologic:     ROS:  CONSTITUTIONAL: No fevers or chills. No weakness or headache. No weight changes.  EYES/ENT: No visual or hearing changes. No sore throat or throat pain .  NECK: No pain or stiffness  RESPIRATORY: No cough, wheezing, or hemoptysis. No shortness of breath  CARDIOVASCULAR: No chest pain or palpitations  GASTROINTESTINAL: No abdominal pain. No nausea or vomiting. No diarrhea or constipation.  GENITOURINARY: No dysuria, frequency or hematuria  NEUROLOGICAL: No numbness or weakness  SKIN: No itching or rashes  PSYCHIATRIC: Pleasant. Appropriate affect    Allergies: No Known Allergies    PMH -- HTN (hypertension)    Hyperlipidemia    [place] (hard of hearing)      PSH -- No significant past surgical history    History of wisdom tooth extraction      FH -- No pertinent family history in first degree relatives    No pertinent family history in first degree relatives      Social History --  EtOH: denies   Tobacco: denies   Drug Use: denies     Travel/Environmental/Occupational History:    Physical Exam--  Vital Signs Last 24 Hrs  T(F): 97.6 (2025 08:27), Max: 97.9 (2025 03:44)  HR: 77 (2025 08:27) (77 - 89)  BP: 148/78 (2025 08:27) (125/69 - 148/78)  RR: 18 (2025 08:27) (18 - 18)  SpO2: 98% (2025 08:27) (96% - 98%)  General: nontoxic-appearing, no acute distress  HEENT: NC/AT, EOMI  Lungs: CTA  Heart: Regular rate and rhythm.   Abdomen: Soft. Nondistended. Nontender.   Extremities: No cyanosis or clubbing. No edema.   Skin: Warm. Dry. Good turgor. No rash.     Laboratory & Imaging Data:  CBC:                       15.0   12.31 )-----------( 229      ( 2025 04:45 )             45.4     CMP:     135  |  103  |  19  ----------------------------<  120[H]  4.5   |  27  |  0.90    Ca    9.1      2025 04:45    TPro  7.0  /  Alb  3.5  /  TBili  0.7  /  DBili  x   /  AST  27  /  ALT  40  /  AlkPhos  125[H]  07-07    LIVER FUNCTIONS - ( 2025 04:45 )  Alb: 3.5 g/dL / Pro: 7.0 g/dL / ALK PHOS: 125 U/L / ALT: 40 U/L / AST: 27 U/L / GGT: x           Urinalysis Basic - ( 2025 04:45 )    Color: Yellow / Appearance: Clear / S.005 / pH: x  Gluc: 120 mg/dL / Ketone: x  / Bili: Negative / Urobili: 1.0 mg/dL   Blood: x / Protein: Negative mg/dL / Nitrite: Negative   Leuk Esterase: Negative / RBC: x / WBC x   Sq Epi: x / Non Sq Epi: x / Bacteria: x        Microbiology: reviewed    Urinalysis with Rflx Culture (collected 25 @ 04:45)          Radiology: reviewed    < from: CT Abdomen and Pelvis w/ IV Cont (25 @ 05:09) >    ACC: [id number] EXAM:  CT ABDOMEN AND PELVIS IC   ORDERED BY: BEN WONG     PROCEDURE DATE:  2025          INTERPRETATION:  CLINICAL INFORMATION: Diffuse abdominal pain    COMPARISON: CT abdomen pelvis 2/3/2019.    CONTRAST/COMPLICATIONS:  IVContrast: Omnipaque 350  90 cc administered   10 cc discarded  Oral Contrast: NONE.    PROCEDURE:  CT of the Abdomen and Pelvis was performed.  Sagittal and coronal reformats were performed.    FINDINGS:  LOWER CHEST: Bilateral lower lobe dependent atelectasis..    LIVER: Within normal limits.  BILE DUCTS: Normal caliber.  GALLBLADDER: Within normal limits.  SPLEEN: Within normal limits.  PANCREAS: Within normal limits.  ADRENALS: Within normal limits.  KIDNEYS/URETERS: Within normal limits.    BLADDER: Punctate focus of gas in the urinary bladder..  REPRODUCTIVE ORGANS: Prostate within normal limits.    BOWEL: Large fecal load. Circumferential rectal wall thickening with   perirectal edema. No bowel obstruction. Appendix is normal.  PERITONEUM/RETROPERITONEUM: Within normal limits.  VESSELS: Atherosclerotic changes.  LYMPH NODES: No lymphadenopathy.  ABDOMINAL WALL: Bilateral fat-containing inguinal hernias.  BONES: Degenerative changes.    IMPRESSION:  Acute proctitis.    Punctate focusof gas in the urinary bladder. Please correlate for recent   instrumentation versus gas-forming infection.    --- End of Report ---            PERRY MEEKS MD; Attending Radiologist  This document has been electronically signed. 2025  5:22AM    < end of copied text >   [facility]  SHELLY Alvarado S. Shah, Y. Patel, G. [facility]  [phone]    MILO CARDONA  84y, Male  [id number]    HPI--  HPI:  84M PMH HTN, HLD, Basal Cell Carcinoma (of forehead s/p excision) BIBEMS from [place] for diffuse abd pain and constipation. has not had a BM in a few days. Does have hx of constipation. Pt also endorsing rectal pain (2025 07:38)  CT showing Acute proctitis. Punctate focusof gas in the urinary bladder. Please correlate for recent instrumentation versus gas-forming infection.  S/p disimpaction in the ED  Pt seen at bedside. Hx obtained from chart review as above  Denies urinary sx        Active Medications--  acetaminophen     Tablet .. 650 milliGRAM(s) Oral every 6 hours PRN  amLODIPine   Tablet 5 milliGRAM(s) Oral daily  atorvastatin 10 milliGRAM(s) Oral at bedtime  enoxaparin Injectable 40 milliGRAM(s) SubCutaneous every 24 hours  melatonin 3 milliGRAM(s) Oral at bedtime  OLANZapine 2.5 milliGRAM(s) Oral at bedtime  ondansetron   Disintegrating Tablet 4 milliGRAM(s) Oral three times a day PRN  polyethylene glycol 3350 17 Gram(s) Oral daily  senna 2 Tablet(s) Oral at bedtime    Antimicrobials:     Immunologic:     ROS:  CONSTITUTIONAL: No fevers or chills. No weakness or headache. No weight changes.  EYES/ENT: No visual or hearing changes. No sore throat or throat pain .  NECK: No pain or stiffness  RESPIRATORY: No cough, wheezing, or hemoptysis. No shortness of breath  CARDIOVASCULAR: No chest pain or palpitations  GASTROINTESTINAL: No abdominal pain. No nausea or vomiting. No diarrhea or constipation.  GENITOURINARY: No dysuria, frequency or hematuria  NEUROLOGICAL: No numbness or weakness  SKIN: No itching or rashes  PSYCHIATRIC: Pleasant. Appropriate affect    Allergies: No Known Allergies    PMH -- HTN (hypertension)    Hyperlipidemia    [place] (hard of hearing)      PSH -- No significant past surgical history    History of wisdom tooth extraction      FH -- No pertinent family history in first degree relatives    No pertinent family history in first degree relatives      Social History --  EtOH: denies   Tobacco: denies   Drug Use: denies     Travel/Environmental/Occupational History:    Physical Exam--  Vital Signs Last 24 Hrs  T(F): 97.6 (2025 08:27), Max: 97.9 (2025 03:44)  HR: 77 (2025 08:27) (77 - 89)  BP: 148/78 (2025 08:27) (125/69 - 148/78)  RR: 18 (2025 08:27) (18 - 18)  SpO2: 98% (2025 08:27) (96% - 98%)  General: nontoxic-appearing, no acute distress  HEENT: NC/AT, EOMI  Lungs: CTA  Heart: Regular rate and rhythm.   Abdomen: Soft. Nondistended. Nontender.   Extremities: No cyanosis or clubbing. No edema.   Skin: Warm. Dry. Good turgor. No rash.     Laboratory & Imaging Data:  CBC:                       15.0   12.31 )-----------( 229      ( 2025 04:45 )             45.4     CMP:     135  |  103  |  19  ----------------------------<  120[H]  4.5   |  27  |  0.90    Ca    9.1      2025 04:45    TPro  7.0  /  Alb  3.5  /  TBili  0.7  /  DBili  x   /  AST  27  /  ALT  40  /  AlkPhos  125[H]  07    LIVER FUNCTIONS - ( 2025 04:45 )  Alb: 3.5 g/dL / Pro: 7.0 g/dL / ALK PHOS: 125 U/L / ALT: 40 U/L / AST: 27 U/L / GGT: x           Urinalysis Basic - ( 2025 04:45 )    Color: Yellow / Appearance: Clear / S.005 / pH: x  Gluc: 120 mg/dL / Ketone: x  / Bili: Negative / Urobili: 1.0 mg/dL   Blood: x / Protein: Negative mg/dL / Nitrite: Negative   Leuk Esterase: Negative / RBC: x / WBC x   Sq Epi: x / Non Sq Epi: x / Bacteria: x        Microbiology: reviewed    Urinalysis with Rflx Culture (collected 25 @ 04:45)          Radiology: reviewed    < from: CT Abdomen and Pelvis w/ IV Cont (25 @ 05:09) >    ACC: [id number] EXAM:  CT ABDOMEN AND PELVIS IC   ORDERED BY: BEN WONG     PROCEDURE DATE:  2025          INTERPRETATION:  CLINICAL INFORMATION: Diffuse abdominal pain    COMPARISON: CT abdomen pelvis 2/3/2019.    CONTRAST/COMPLICATIONS:  IVContrast: Omnipaque 350  90 cc administered   10 cc discarded  Oral Contrast: NONE.    PROCEDURE:  CT of the Abdomen and Pelvis was performed.  Sagittal and coronal reformats were performed.    FINDINGS:  LOWER CHEST: Bilateral lower lobe dependent atelectasis..    LIVER: Within normal limits.  BILE DUCTS: Normal caliber.  GALLBLADDER: Within normal limits.  SPLEEN: Within normal limits.  PANCREAS: Within normal limits.  ADRENALS: Within normal limits.  KIDNEYS/URETERS: Within normal limits.    BLADDER: Punctate focus of gas in the urinary bladder..  REPRODUCTIVE ORGANS: Prostate within normal limits.    BOWEL: Large fecal load. Circumferential rectal wall thickening with   perirectal edema. No bowel obstruction. Appendix is normal.  PERITONEUM/RETROPERITONEUM: Within normal limits.  VESSELS: Atherosclerotic changes.  LYMPH NODES: No lymphadenopathy.  ABDOMINAL WALL: Bilateral fat-containing inguinal hernias.  BONES: Degenerative changes.    IMPRESSION:  Acute proctitis.    Punctate focusof gas in the urinary bladder. Please correlate for recent   instrumentation versus gas-forming infection.    --- End of Report ---            PERRY MEEKS MD; Attending Radiologist  This document has been electronically signed. 2025  5:22AM    < end of copied text >

## 2025-07-07 NOTE — ED ADULT NURSE NOTE - CHIEF COMPLAINT QUOTE
BIBEMS from [facility] c/o worsening abd pain and rectal pain; no BM in 2 days,  No n/v/d.

## 2025-07-08 LAB
ALBUMIN SERPL ELPH-MCNC: 3 G/DL — LOW (ref 3.3–5)
ALP SERPL-CCNC: 102 U/L — SIGNIFICANT CHANGE UP (ref 40–120)
ALT FLD-CCNC: 32 U/L — SIGNIFICANT CHANGE UP (ref 12–78)
ANION GAP SERPL CALC-SCNC: 4 MMOL/L — LOW (ref 5–17)
AST SERPL-CCNC: 18 U/L — SIGNIFICANT CHANGE UP (ref 15–37)
BILIRUB SERPL-MCNC: 0.7 MG/DL — SIGNIFICANT CHANGE UP (ref 0.2–1.2)
BUN SERPL-MCNC: 12 MG/DL — SIGNIFICANT CHANGE UP (ref 7–23)
CALCIUM SERPL-MCNC: 8.6 MG/DL — SIGNIFICANT CHANGE UP (ref 8.5–10.1)
CHLORIDE SERPL-SCNC: 110 MMOL/L — HIGH (ref 96–108)
CO2 SERPL-SCNC: 29 MMOL/L — SIGNIFICANT CHANGE UP (ref 22–31)
CREAT SERPL-MCNC: 0.83 MG/DL — SIGNIFICANT CHANGE UP (ref 0.5–1.3)
EGFR: 86 ML/MIN/1.73M2 — SIGNIFICANT CHANGE UP
EGFR: 86 ML/MIN/1.73M2 — SIGNIFICANT CHANGE UP
GLUCOSE SERPL-MCNC: 87 MG/DL — SIGNIFICANT CHANGE UP (ref 70–99)
HCT VFR BLD CALC: 41.3 % — SIGNIFICANT CHANGE UP (ref 39–50)
HGB BLD-MCNC: 13.7 G/DL — SIGNIFICANT CHANGE UP (ref 13–17)
MCHC RBC-ENTMCNC: 27.7 PG — SIGNIFICANT CHANGE UP (ref 27–34)
MCHC RBC-ENTMCNC: 33.2 G/DL — SIGNIFICANT CHANGE UP (ref 32–36)
MCV RBC AUTO: 83.4 FL — SIGNIFICANT CHANGE UP (ref 80–100)
NRBC # BLD AUTO: 0 K/UL — SIGNIFICANT CHANGE UP (ref 0–0)
NRBC # FLD: 0 K/UL — SIGNIFICANT CHANGE UP (ref 0–0)
NRBC BLD AUTO-RTO: 0 /100 WBCS — SIGNIFICANT CHANGE UP (ref 0–0)
PLATELET # BLD AUTO: 187 K/UL — SIGNIFICANT CHANGE UP (ref 150–400)
PMV BLD: 8.8 FL — SIGNIFICANT CHANGE UP (ref 7–13)
POTASSIUM SERPL-MCNC: 3.7 MMOL/L — SIGNIFICANT CHANGE UP (ref 3.5–5.3)
POTASSIUM SERPL-SCNC: 3.7 MMOL/L — SIGNIFICANT CHANGE UP (ref 3.5–5.3)
PROT SERPL-MCNC: 5.9 G/DL — LOW (ref 6–8.3)
RBC # BLD: 4.95 M/UL — SIGNIFICANT CHANGE UP (ref 4.2–5.8)
RBC # FLD: 13.6 % — SIGNIFICANT CHANGE UP (ref 10.3–14.5)
SODIUM SERPL-SCNC: 143 MMOL/L — SIGNIFICANT CHANGE UP (ref 135–145)
WBC # BLD: 6.34 K/UL — SIGNIFICANT CHANGE UP (ref 3.8–10.5)
WBC # FLD AUTO: 6.34 K/UL — SIGNIFICANT CHANGE UP (ref 3.8–10.5)

## 2025-07-08 PROCEDURE — 74177 CT ABD & PELVIS W/CONTRAST: CPT

## 2025-07-08 PROCEDURE — 80053 COMPREHEN METABOLIC PANEL: CPT

## 2025-07-08 PROCEDURE — 74018 RADEX ABDOMEN 1 VIEW: CPT | Mod: 26

## 2025-07-08 PROCEDURE — 81003 URINALYSIS AUTO W/O SCOPE: CPT

## 2025-07-08 PROCEDURE — 85025 COMPLETE CBC W/AUTO DIFF WBC: CPT

## 2025-07-08 PROCEDURE — 85027 COMPLETE CBC AUTOMATED: CPT

## 2025-07-08 PROCEDURE — 36415 COLL VENOUS BLD VENIPUNCTURE: CPT

## 2025-07-08 PROCEDURE — 83690 ASSAY OF LIPASE: CPT

## 2025-07-08 PROCEDURE — 74018 RADEX ABDOMEN 1 VIEW: CPT

## 2025-07-08 PROCEDURE — 97162 PT EVAL MOD COMPLEX 30 MIN: CPT

## 2025-07-08 RX ADMIN — Medication 3 MILLIGRAM(S): at 22:24

## 2025-07-08 RX ADMIN — POLYETHYLENE GLYCOL 3350 17 GRAM(S): 17 POWDER, FOR SOLUTION ORAL at 12:35

## 2025-07-08 RX ADMIN — AMLODIPINE BESYLATE 5 MILLIGRAM(S): 10 TABLET ORAL at 05:52

## 2025-07-08 RX ADMIN — ENOXAPARIN SODIUM 40 MILLIGRAM(S): 100 INJECTION SUBCUTANEOUS at 12:35

## 2025-07-08 RX ADMIN — ATORVASTATIN CALCIUM 10 MILLIGRAM(S): 80 TABLET, FILM COATED ORAL at 22:24

## 2025-07-08 RX ADMIN — Medication 2 TABLET(S): at 22:24

## 2025-07-08 RX ADMIN — OLANZAPINE 2.5 MILLIGRAM(S): 10 TABLET ORAL at 22:31

## 2025-07-08 NOTE — PHYSICAL THERAPY INITIAL EVALUATION ADULT - FOLLOWS COMMANDS/ANSWERS QUESTIONS, REHAB EVAL
Patient was out of his room for most of the evening  The unit became very stimulating and patient began yelling at some of the other patients  Patient was able to be redirected but requested medication for anxiety  Also requested his nighttime meds  Said he cannot handle the constant commotion of the unit  Prn atarax 50 given along with meds  Patient then went to his room and laid down  100% of the time

## 2025-07-08 NOTE — PROGRESS NOTE ADULT - ASSESSMENT
fecal impaction  constipation    large fecal load  s/p disimpaction  CT noted  s/p Mg Citrate  miralax daily  senna qhs  monitor GI fn  no GI objection to d/c planning

## 2025-07-08 NOTE — PROGRESS NOTE ADULT - ASSESSMENT
84M PMH HTN, HLD, Basal Cell Carcinoma (of forehead s/p excision) BIBEMS from [facility] for diffuse abd pain and constipation. has not had a BM in a few days. Does have hx of constipation. Pt also endorsing rectal pain    Stercolitis, constipation  - sp disimpaction  - cw bowel regimen  - advance diet as tolerated   - GI fu  - monitor off antibiotics    HTN  - cw home meds  - DASH diet    HLD  - cw statin    DVT prophylaxis       PT and rehab planning

## 2025-07-08 NOTE — PROGRESS NOTE ADULT - ASSESSMENT
84M PMH HTN, HLD, Basal Cell Carcinoma (of forehead s/p excision) BIBEMS from [facility] for diffuse abd pain and constipation. has not had a BM in a few days. Does have hx of constipation. Pt also endorsing rectal pain  CT showing Acute proctitis. Punctate focus of gas in the urinary bladder. UA clear. Afebrile, leukocytosis to 12  S/p disimpaction in the ED. S/p zosyn x1 dose in the ED    Recommendations:   Suspect proctitis, inflammation 2/2 constipation, now relieved  Monitor off Abx  Trend temps/WBC  GI following  Additional care per primary team    Patient evaluated with face-to-face time in addition to reviewing history, labs, microbiology, and imaging.   Antibiotic stewardship, local antibiogram, infection control strategies and potential transmission issues taken into consideration at time of treatment decision making process.   Thank you for allowing us to participate in the care of your patient.  Infectious Diseases will follow. Please call with any questions.  Jennie Zazueta M.D.  Available on [employer] TEAMS -- *[facility]*  [place] [facility] [phone]  For after 5 P.M. and weekends, please call [phone]

## 2025-07-08 NOTE — PHYSICAL THERAPY INITIAL EVALUATION ADULT - PERTINENT HX OF CURRENT PROBLEM, REHAB EVAL
84M PMH HTN, HLD, Basal Cell Carcinoma (of forehead s/p excision) BIBEMS from [facility] for diffuse abd pain and constipation. has not had a BM in a few days. Does have hx of constipation. Pt also endorsing rectal pain

## 2025-07-08 NOTE — PHYSICAL THERAPY INITIAL EVALUATION ADULT - GAIT TRAINING, PT EVAL
Patient will ambulate x 100 feet independently with appropriate device in 2 weeks in order to increase mobility at home

## 2025-07-08 NOTE — PHYSICAL THERAPY INITIAL EVALUATION ADULT - ADDITIONAL COMMENTS
Patient lives in an apartment, +YADIRA then resides on main floor. Patient was independent in ADLs and ambulation without device, patient has tub shower, RW and SAC

## 2025-07-09 RX ORDER — LACTULOSE 10 G/15ML
30 SOLUTION ORAL ONCE
Refills: 0 | Status: COMPLETED | OUTPATIENT
Start: 2025-07-09 | End: 2025-07-09

## 2025-07-09 RX ADMIN — Medication 3 MILLIGRAM(S): at 21:49

## 2025-07-09 RX ADMIN — LACTULOSE 30 GRAM(S): 10 SOLUTION ORAL at 13:35

## 2025-07-09 RX ADMIN — ENOXAPARIN SODIUM 40 MILLIGRAM(S): 100 INJECTION SUBCUTANEOUS at 13:34

## 2025-07-09 RX ADMIN — POLYETHYLENE GLYCOL 3350 17 GRAM(S): 17 POWDER, FOR SOLUTION ORAL at 13:35

## 2025-07-09 RX ADMIN — ATORVASTATIN CALCIUM 10 MILLIGRAM(S): 80 TABLET, FILM COATED ORAL at 21:49

## 2025-07-09 RX ADMIN — AMLODIPINE BESYLATE 5 MILLIGRAM(S): 10 TABLET ORAL at 05:53

## 2025-07-09 RX ADMIN — Medication 2 TABLET(S): at 21:50

## 2025-07-09 RX ADMIN — OLANZAPINE 2.5 MILLIGRAM(S): 10 TABLET ORAL at 21:49

## 2025-07-09 NOTE — PROGRESS NOTE ADULT - ASSESSMENT
84M PMH HTN, HLD, Basal Cell Carcinoma (of forehead s/p excision) BIBEMS from [facility] for diffuse abd pain and constipation. has not had a BM in a few days. Does have hx of constipation. Pt also endorsing rectal pain    Stercolitis, constipation  - sp disimpaction  - cw bowel regimen  - advance diet as tolerated   - GI fu  - monitor off antibiotics    HTN  - cw home meds  - DASH diet    HLD  - cw statin    DVT prophylaxis       PT and rehab planning

## 2025-07-09 NOTE — PROGRESS NOTE ADULT - ASSESSMENT
84M PMH HTN, HLD, Basal Cell Carcinoma (of forehead s/p excision) BIBEMS from [facility] for diffuse abd pain and constipation. has not had a BM in a few days. Does have hx of constipation. Pt also endorsing rectal pain  CT showing Acute proctitis. Punctate focus of gas in the urinary bladder. UA clear. Afebrile, leukocytosis to 12  S/p disimpaction in the ED. S/p zosyn x1 dose in the ED    Recommendations:   Suspect proctitis, inflammation 2/2 constipation, now relieved  Monitor off Abx  Trend temps/WBC  GI following  Additional care per primary team    Patient evaluated with face-to-face time in addition to reviewing history, labs, microbiology, and imaging.   Antibiotic stewardship, local antibiogram, infection control strategies and potential transmission issues taken into consideration at time of treatment decision making process.   Thank you for allowing us to participate in the care of your patient.  Infectious Diseases will follow. Please call with any questions.  Jennie Zazueta M.D.  Available on [employer] TEAMS -- *[facility]*  [place] [facility] [phone]  For after 5 P.M. and weekends, please call [phone]

## 2025-07-09 NOTE — PROGRESS NOTE ADULT - ASSESSMENT
fecal impaction  constipation    large fecal load  s/p disimpaction  CT noted  s/p Mg Citrate  miralax daily  senna qhs  monitor GI fn  advance diet  no GI objection to d/c planning

## 2025-07-10 RX ORDER — MAGNESIUM CITRATE
296 SOLUTION, ORAL ORAL ONCE
Refills: 0 | Status: COMPLETED | OUTPATIENT
Start: 2025-07-10 | End: 2025-07-10

## 2025-07-10 RX ADMIN — Medication 296 MILLILITER(S): at 18:28

## 2025-07-10 RX ADMIN — AMLODIPINE BESYLATE 5 MILLIGRAM(S): 10 TABLET ORAL at 05:36

## 2025-07-10 RX ADMIN — Medication 3 MILLIGRAM(S): at 21:59

## 2025-07-10 RX ADMIN — POLYETHYLENE GLYCOL 3350 17 GRAM(S): 17 POWDER, FOR SOLUTION ORAL at 12:46

## 2025-07-10 RX ADMIN — OLANZAPINE 2.5 MILLIGRAM(S): 10 TABLET ORAL at 21:59

## 2025-07-10 RX ADMIN — ENOXAPARIN SODIUM 40 MILLIGRAM(S): 100 INJECTION SUBCUTANEOUS at 12:46

## 2025-07-10 RX ADMIN — Medication 2 TABLET(S): at 21:59

## 2025-07-10 RX ADMIN — ATORVASTATIN CALCIUM 10 MILLIGRAM(S): 80 TABLET, FILM COATED ORAL at 21:59

## 2025-07-10 NOTE — PATIENT CHOICE NOTE. - NSPTCHOICESTATE_GEN_ALL_CORE

## 2025-07-10 NOTE — SOCIAL WORK PROGRESS NOTE - NSSWPROGRESSNOTE_GEN_ALL_CORE
ANITA received phone call from pt's daughter Jennie, inquiring if pt will be dc today, due to [place] [facility], taking pt back without initiating a new Auth before midnight. ANITA inquired from MD if pt was medically cleared. MD reports pt is still constipated, impacted, and will more then likely be dc ready tomorrow. ANITA will then initiate a new Auth for [facility]. ANITA will continue to follow.  ANITA received phone call from pt's daughter Jennie, inquiring if pt will be dc today, due to [place] [facility], taking pt back without initiating a new Auth before midnight. ANITA inquired from MD if pt was medically cleared. MD reports pt is still constipated, impacted, and will more then likely be dc ready tomorrow. MD and ANITA informed pt's daughter, in agreement and understood. ANITA will then initiate a new Auth for [facility]. ANITA will continue to follow.

## 2025-07-10 NOTE — PROGRESS NOTE ADULT - ASSESSMENT
fecal impaction  constipation    large fecal load  s/p disimpaction  CT noted  s/p Mg Citrate  miralax daily  senna qhs  monitor GI fn  advance diet  Xray from 7/9 noted, pt still has stool load in right side of colon  ordered another x1 Mg Citrate  If pt has BM then can consider d/c planning tomorrow

## 2025-07-10 NOTE — PROGRESS NOTE ADULT - ASSESSMENT
84M PMH HTN, HLD, Basal Cell Carcinoma (of forehead s/p excision) BIBEMS from [facility] for diffuse abd pain and constipation. has not had a BM in a few days. Does have hx of constipation. Pt also endorsing rectal pain    Stercolitis, constipation  - sp disimpaction  - cw bowel regimen  - advance diet as tolerated   - GI fu  - monitor off antibiotics    HTN  - cw home meds  - DASH diet    HLD  - cw statin    DVT prophylaxis       PT and rehab planning once he has a BM  dw daughter

## 2025-07-10 NOTE — PROGRESS NOTE ADULT - ASSESSMENT
84M PMH HTN, HLD, Basal Cell Carcinoma (of forehead s/p excision) BIBEMS from [facility] for diffuse abd pain and constipation. has not had a BM in a few days. Does have hx of constipation. Pt also endorsing rectal pain  CT showing Acute proctitis. Punctate focus of gas in the urinary bladder. UA clear. Afebrile, leukocytosis to 12  S/p disimpaction in the ED. S/p zosyn x1 dose in the ED    Recommendations:   Suspect proctitis, inflammation 2/2 constipation, now relieved  Monitor off Abx  Trend temps/WBC  bowel regimen, GI following  Additional care, d/c planning per primary team    Patient evaluated with face-to-face time in addition to reviewing history, labs, microbiology, and imaging.   Antibiotic stewardship, local antibiogram, infection control strategies and potential transmission issues taken into consideration at time of treatment decision making process.   Thank you for allowing us to participate in the care of your patient.  Please call with any questions.  Jennie Zazueta M.D.  Available on [employer] TEAMS -- *[facility]*  [employer] [facility] [phone]  For after 5 P.M. and weekends, please call [phone] 84M PMH HTN, HLD, Basal Cell Carcinoma (of forehead s/p excision) BIBEMS from [facility] for diffuse abd pain and constipation. has not had a BM in a few days. Does have hx of constipation. Pt also endorsing rectal pain  CT showing Acute proctitis. Punctate focus of gas in the urinary bladder. UA clear. Afebrile, leukocytosis to 12  S/p disimpaction in the ED. S/p zosyn x1 dose in the ED    Recommendations:   Suspect proctitis, inflammation 2/2 constipation, now relieved  Abdo XR showing more stool  Monitor off Abx  Trend temps/WBC  bowel regimen, GI following  Additional care, d/c planning per primary team    Patient evaluated with face-to-face time in addition to reviewing history, labs, microbiology, and imaging.   Antibiotic stewardship, local antibiogram, infection control strategies and potential transmission issues taken into consideration at time of treatment decision making process.   Thank you for allowing us to participate in the care of your patient.  ID will sign off  Please call with any questions.  Jennie Zazueta M.D.  Available on [employer] TEAMS -- *[facility]*  [place] [facility] [phone]  For after 5 P.M. and weekends, please call [phone]

## 2025-07-11 PROCEDURE — 74018 RADEX ABDOMEN 1 VIEW: CPT | Mod: 26

## 2025-07-11 RX ORDER — POLYETHYLENE GLYCOL-3350 AND ELECTROLYTES 236; 6.74; 5.86; 2.97; 22.74 G/274.31G; G/274.31G; G/274.31G; G/274.31G; G/274.31G
1000 POWDER, FOR SOLUTION ORAL ONCE
Refills: 0 | Status: COMPLETED | OUTPATIENT
Start: 2025-07-11 | End: 2025-07-11

## 2025-07-11 RX ADMIN — AMLODIPINE BESYLATE 5 MILLIGRAM(S): 10 TABLET ORAL at 05:45

## 2025-07-11 RX ADMIN — ATORVASTATIN CALCIUM 10 MILLIGRAM(S): 80 TABLET, FILM COATED ORAL at 21:58

## 2025-07-11 RX ADMIN — Medication 2 TABLET(S): at 21:57

## 2025-07-11 RX ADMIN — ENOXAPARIN SODIUM 40 MILLIGRAM(S): 100 INJECTION SUBCUTANEOUS at 12:56

## 2025-07-11 RX ADMIN — POLYETHYLENE GLYCOL 3350 17 GRAM(S): 17 POWDER, FOR SOLUTION ORAL at 12:56

## 2025-07-11 RX ADMIN — POLYETHYLENE GLYCOL-3350 AND ELECTROLYTES 1000 MILLILITER(S): 236; 6.74; 5.86; 2.97; 22.74 POWDER, FOR SOLUTION ORAL at 15:00

## 2025-07-11 NOTE — PHARMACOTHERAPY INTERVENTION NOTE - COMMENTS
Medication reconciliation completed on admission by pharmacy representative using [facility] med list.  Updated medication list in OMR/prescription writer, discussed with Dr. Reed who will review and update per clinical discretion.    Home Medications:  melatonin 3 mg oral tablet: 1 tab(s) orally once a day (at bedtime) (11 Jul 2025 14:00)  Norvasc 5 mg oral tablet: 1 tab(s) orally once a day (11 Jul 2025 14:00)  nystatin 100,000 units/g topical powder: Apply topically to affected area 3 times a day (8am, 12pm, 4pm) (11 Jul 2025 14:08)  polyethylene glycol 3350 oral powder for reconstitution: 17 gram(s) orally once a day (11 Jul 2025 14:00)  Refresh Classic (PF) 1.4%-0.6% eye drops: Instill 1 drop to each eye once a day (11 Jul 2025 14:08)  senna leaf extract oral tablet: 2 tab(s) orally once a day (at bedtime) (11 Jul 2025 14:00)  simvastatin 20 mg oral tablet: 1 tab(s) orally once a day (at bedtime) (11 Jul 2025 14:00)

## 2025-07-11 NOTE — SOCIAL WORK PROGRESS NOTE - NSSWPROGRESSNOTE_GEN_ALL_CORE
Per interdisciplinary rounds, pt still has not had a BM since admission in ED (7/7), pt had xray this morning. SW will work on getting another Auth for return to [facility]. SW informed pt's daughter. SW will continue to follow for possible dc over weekend, if BM is had.  Per interdisciplinary rounds, pt still has not had a BM since admission in ED (7/7), pt had xray this morning. SW informed pt's daughter. Pending Auth # [id number]. SW will continue to follow for possible dc over weekend, if BM is had.

## 2025-07-11 NOTE — PROGRESS NOTE ADULT - ASSESSMENT
84M PMH HTN, HLD, Basal Cell Carcinoma (of forehead s/p excision) BIBEMS from [facility] for diffuse abd pain and constipation. has not had a BM in a few days. Does have hx of constipation. Pt also endorsing rectal pain    Stercolitis, constipation  - sp disimpaction  - cw bowel regimen  - advance diet as tolerated   - GI fu  - added moviprep     HTN  - cw home meds  - DASH diet    HLD  - cw statin    DVT prophylaxis       PT and rehab planning once he has a BM  dw daughter

## 2025-07-11 NOTE — PROGRESS NOTE ADULT - ASSESSMENT
fecal impaction  constipation    large fecal load  s/p disimpaction  CT noted  s/p Mg Citrate  miralax daily  senna qhs  monitor GI fn  advance diet  Xray from 7/9 noted, pt still has stool load in right side of colon  ordered another x1 Mg Citrate yesterday, no BM  ordering 1L Moviprep

## 2025-07-12 PROCEDURE — 85025 COMPLETE CBC W/AUTO DIFF WBC: CPT

## 2025-07-12 PROCEDURE — 97530 THERAPEUTIC ACTIVITIES: CPT

## 2025-07-12 PROCEDURE — 36415 COLL VENOUS BLD VENIPUNCTURE: CPT

## 2025-07-12 PROCEDURE — 83690 ASSAY OF LIPASE: CPT

## 2025-07-12 PROCEDURE — 97116 GAIT TRAINING THERAPY: CPT

## 2025-07-12 PROCEDURE — 85027 COMPLETE CBC AUTOMATED: CPT

## 2025-07-12 PROCEDURE — 74018 RADEX ABDOMEN 1 VIEW: CPT

## 2025-07-12 PROCEDURE — 74177 CT ABD & PELVIS W/CONTRAST: CPT

## 2025-07-12 PROCEDURE — 80053 COMPREHEN METABOLIC PANEL: CPT

## 2025-07-12 PROCEDURE — 81003 URINALYSIS AUTO W/O SCOPE: CPT

## 2025-07-12 PROCEDURE — 97162 PT EVAL MOD COMPLEX 30 MIN: CPT

## 2025-07-12 RX ADMIN — ENOXAPARIN SODIUM 40 MILLIGRAM(S): 100 INJECTION SUBCUTANEOUS at 11:17

## 2025-07-12 RX ADMIN — POLYETHYLENE GLYCOL 3350 17 GRAM(S): 17 POWDER, FOR SOLUTION ORAL at 11:17

## 2025-07-12 RX ADMIN — AMLODIPINE BESYLATE 5 MILLIGRAM(S): 10 TABLET ORAL at 05:41

## 2025-07-12 RX ADMIN — ATORVASTATIN CALCIUM 10 MILLIGRAM(S): 80 TABLET, FILM COATED ORAL at 22:31

## 2025-07-12 RX ADMIN — Medication 2 TABLET(S): at 22:31

## 2025-07-12 NOTE — SOCIAL WORK PROGRESS NOTE - NSSWPROGRESSNOTE_GEN_ALL_CORE
Clinicals faxed with pending ref # [id number] to [facility] for [facility] auth. Pending auth for discharge and MD clearance. Per RN, pt had BM overnight. Sw to follow and remain available for any needs. SW spoke with Rosina from [facility] regarding auth. Per Rosina, clinicals weren't received and review is pending receipt of clinicals. Clinicals faxed with pending ref # [id number] to [facility] for [facility] auth. Pending auth for discharge and MD clearance. Per RN, pt had BM overnight. Per Rosina, [facility] closed on the weekend and reviewer will look @ clinicals Monday. Sw to follow and remain available for any needs.

## 2025-07-12 NOTE — PROGRESS NOTE ADULT - ASSESSMENT
84M PMH HTN, HLD, Basal Cell Carcinoma (of forehead s/p excision) BIBEMS from [facility] for diffuse abd pain and constipation. has not had a BM in a few days. Does have hx of constipation. Pt also endorsing rectal pain    Stercolitis, constipation  - sp disimpaction  - cw bowel regimen  - advance diet as tolerated   - GI fu  - added moviprep , now had a few bowel movements     HTN  - cw home meds  - DASH diet    HLD  - cw statin    DVT prophylaxis       PT and rehab planning once he has a BM  dw daughter

## 2025-07-13 PROCEDURE — 74018 RADEX ABDOMEN 1 VIEW: CPT | Mod: 26

## 2025-07-13 RX ADMIN — ENOXAPARIN SODIUM 40 MILLIGRAM(S): 100 INJECTION SUBCUTANEOUS at 11:29

## 2025-07-13 RX ADMIN — ATORVASTATIN CALCIUM 10 MILLIGRAM(S): 80 TABLET, FILM COATED ORAL at 22:18

## 2025-07-13 RX ADMIN — AMLODIPINE BESYLATE 5 MILLIGRAM(S): 10 TABLET ORAL at 06:01

## 2025-07-13 RX ADMIN — POLYETHYLENE GLYCOL 3350 17 GRAM(S): 17 POWDER, FOR SOLUTION ORAL at 11:28

## 2025-07-13 NOTE — PROGRESS NOTE ADULT - ASSESSMENT
84M PMH HTN, HLD, Basal Cell Carcinoma (of forehead s/p excision) BIBEMS from [facility] for diffuse abd pain and constipation. has not had a BM in a few days. Does have hx of constipation. Pt also endorsing rectal pain    Stercolitis, constipation  - sp disimpaction  - cw bowel regimen  - advance diet as tolerated   - GI fu  - sp  moviprep     HTN  - cw home meds  - DASH diet    HLD  - cw statin    DVT prophylaxis       PT and rehab planning in am

## 2025-07-14 DIAGNOSIS — J90 PLEURAL EFFUSION, NOT ELSEWHERE CLASSIFIED: ICD-10-CM

## 2025-07-14 LAB
ALBUMIN SERPL ELPH-MCNC: 3.3 G/DL — SIGNIFICANT CHANGE UP (ref 3.3–5)
ALP SERPL-CCNC: 107 U/L — SIGNIFICANT CHANGE UP (ref 40–120)
ALT FLD-CCNC: 47 U/L — SIGNIFICANT CHANGE UP (ref 12–78)
ANION GAP SERPL CALC-SCNC: 6 MMOL/L — SIGNIFICANT CHANGE UP (ref 5–17)
AST SERPL-CCNC: 36 U/L — SIGNIFICANT CHANGE UP (ref 15–37)
BILIRUB SERPL-MCNC: 0.5 MG/DL — SIGNIFICANT CHANGE UP (ref 0.2–1.2)
BUN SERPL-MCNC: 11 MG/DL — SIGNIFICANT CHANGE UP (ref 7–23)
CALCIUM SERPL-MCNC: 8.9 MG/DL — SIGNIFICANT CHANGE UP (ref 8.5–10.1)
CHLORIDE SERPL-SCNC: 108 MMOL/L — SIGNIFICANT CHANGE UP (ref 96–108)
CO2 SERPL-SCNC: 28 MMOL/L — SIGNIFICANT CHANGE UP (ref 22–31)
CREAT SERPL-MCNC: 0.7 MG/DL — SIGNIFICANT CHANGE UP (ref 0.5–1.3)
EGFR: 91 ML/MIN/1.73M2 — SIGNIFICANT CHANGE UP
EGFR: 91 ML/MIN/1.73M2 — SIGNIFICANT CHANGE UP
GLUCOSE SERPL-MCNC: 92 MG/DL — SIGNIFICANT CHANGE UP (ref 70–99)
HCT VFR BLD CALC: 44.4 % — SIGNIFICANT CHANGE UP (ref 39–50)
HGB BLD-MCNC: 14.3 G/DL — SIGNIFICANT CHANGE UP (ref 13–17)
MCHC RBC-ENTMCNC: 26.7 PG — LOW (ref 27–34)
MCHC RBC-ENTMCNC: 32.2 G/DL — SIGNIFICANT CHANGE UP (ref 32–36)
MCV RBC AUTO: 83 FL — SIGNIFICANT CHANGE UP (ref 80–100)
NRBC # BLD AUTO: 0 K/UL — SIGNIFICANT CHANGE UP (ref 0–0)
NRBC # FLD: 0 K/UL — SIGNIFICANT CHANGE UP (ref 0–0)
NRBC BLD AUTO-RTO: 0 /100 WBCS — SIGNIFICANT CHANGE UP (ref 0–0)
PLATELET # BLD AUTO: 207 K/UL — SIGNIFICANT CHANGE UP (ref 150–400)
PMV BLD: 8.8 FL — SIGNIFICANT CHANGE UP (ref 7–13)
POTASSIUM SERPL-MCNC: 3.6 MMOL/L — SIGNIFICANT CHANGE UP (ref 3.5–5.3)
POTASSIUM SERPL-SCNC: 3.6 MMOL/L — SIGNIFICANT CHANGE UP (ref 3.5–5.3)
PROT SERPL-MCNC: 6.5 G/DL — SIGNIFICANT CHANGE UP (ref 6–8.3)
RBC # BLD: 5.35 M/UL — SIGNIFICANT CHANGE UP (ref 4.2–5.8)
RBC # FLD: 13.2 % — SIGNIFICANT CHANGE UP (ref 10.3–14.5)
SODIUM SERPL-SCNC: 142 MMOL/L — SIGNIFICANT CHANGE UP (ref 135–145)
WBC # BLD: 6.32 K/UL — SIGNIFICANT CHANGE UP (ref 3.8–10.5)
WBC # FLD AUTO: 6.32 K/UL — SIGNIFICANT CHANGE UP (ref 3.8–10.5)

## 2025-07-14 PROCEDURE — 97162 PT EVAL MOD COMPLEX 30 MIN: CPT

## 2025-07-14 PROCEDURE — 36415 COLL VENOUS BLD VENIPUNCTURE: CPT

## 2025-07-14 PROCEDURE — 83690 ASSAY OF LIPASE: CPT

## 2025-07-14 PROCEDURE — 97116 GAIT TRAINING THERAPY: CPT

## 2025-07-14 PROCEDURE — 80053 COMPREHEN METABOLIC PANEL: CPT

## 2025-07-14 PROCEDURE — 97530 THERAPEUTIC ACTIVITIES: CPT

## 2025-07-14 PROCEDURE — 85027 COMPLETE CBC AUTOMATED: CPT

## 2025-07-14 PROCEDURE — 81003 URINALYSIS AUTO W/O SCOPE: CPT

## 2025-07-14 PROCEDURE — 74177 CT ABD & PELVIS W/CONTRAST: CPT

## 2025-07-14 PROCEDURE — 74018 RADEX ABDOMEN 1 VIEW: CPT

## 2025-07-14 PROCEDURE — 85025 COMPLETE CBC W/AUTO DIFF WBC: CPT

## 2025-07-14 RX ADMIN — Medication 2 TABLET(S): at 22:30

## 2025-07-14 RX ADMIN — Medication 650 MILLIGRAM(S): at 22:29

## 2025-07-14 RX ADMIN — POLYETHYLENE GLYCOL 3350 17 GRAM(S): 17 POWDER, FOR SOLUTION ORAL at 11:10

## 2025-07-14 RX ADMIN — ENOXAPARIN SODIUM 40 MILLIGRAM(S): 100 INJECTION SUBCUTANEOUS at 12:00

## 2025-07-14 RX ADMIN — AMLODIPINE BESYLATE 5 MILLIGRAM(S): 10 TABLET ORAL at 05:44

## 2025-07-14 RX ADMIN — ATORVASTATIN CALCIUM 10 MILLIGRAM(S): 80 TABLET, FILM COATED ORAL at 22:30

## 2025-07-14 NOTE — DIETITIAN INITIAL EVALUATION ADULT - OTHER INFO
83 y/o male adm with diffuse abd pain and constipation. PMH [place], HTN, hyperlipidemia. As per nursing assessments, pt is confused. At bedside visit this morning, pt was sleeping soundly. Pt with visible signs of moderate temporal depletion and orbital fat loss noted. Pt is tolerating soft and bite sized diet well with fair to good po intake noted. % of meals consumed. Wts vary greatly. Wt from [facility] is 185#. adm in hospital 7/7 wt 184.9# 7/13 155.23 1/14 146.3#. ? wt loss accurate

## 2025-07-14 NOTE — PROGRESS NOTE ADULT - PROVIDER SPECIALTY LIST ADULT
Gastroenterology
Gastroenterology
Hospitalist
Hospitalist
Infectious Disease
Infectious Disease
Hospitalist
Hospitalist
Infectious Disease
Hospitalist
Gastroenterology

## 2025-07-14 NOTE — SOCIAL WORK PROGRESS NOTE - NSSWPROGRESSNOTE_GEN_ALL_CORE
Sw has been in contact with MD pt is for dc in the am to [facility] auth recieved from 7/11-7/15  Auth [id number] facility is aware of dc in the am. Evan Schofield called to request a refill on her medication.     Patient has an appointment 1/3/24  Last office visit : 8/21/2023   Next office visit : 1/3/2024     Requested Prescriptions     Pending Prescriptions Disp Refills    lisinopril-hydroCHLOROthiazide (PRINZIDE;ZESTORETIC) 20-25 MG per tablet 30 tablet 0     Sig: Take 1 tablet by mouth daily    cyclobenzaprine (FLEXERIL) 10 MG tablet 30 tablet 0     Sig: TAKE ONE TABLET BY MOUTH THREE TIMES DAILY AS NEEDED FOR MUSCLE SPASMS            Alex Tovar LPN

## 2025-07-14 NOTE — DIETITIAN INITIAL EVALUATION ADULT - CALCULATED FROM (ML/KG)
SUBJECTIVE:  Patient remains in airborne isolation, occasional episodes of hemoptysis yesterday, none this morning, afebrile.    OBJECTIVE:    GENERAL: An elderly woman who looks chronically ill in no acute distress.  Alert and oriented x3.    Vital Signs (last 24 hrs)_____ Last Charted___________Minimum____________ Maximum____________  Temp    L 97.6 (AUG 02 08:52) L 97.6 (AUG 02 08:52) 98.1  (AUG 01 16:11)  Heart Rate   84  (AUG 02 08:52) 65  (AUG 01 16:11) 84  (AUG 02 08:52)  Resp Rate       H 21 (AUG 02 08:52) 16  (AUG 01 16:11) H 21 (AUG 02 08:52)  SBP    H 161 (AUG 02 08:52) H 158 (AUG 01 23:16) H 174 (AUG 01 16:11)  DBP    65  (AUG 02 08:52) L 56 (AUG 01 23:16) H 94 (AUG 01 19:54)      HEART: S1 and S2 are audible.  No murmur noted.    LUNGS: Crackles at the bases.  No wheezing present    ABDOMEN: Soft nondistended bowel sounds are present.    EXTREMITIES: No edema clubbing or cyanosis.  Right upper extremity AV fistula without any signs of infection.  The left permacath site looks good.    Labs (Last four charted values)  WBC                  9.5 (AUG 02) 8.8 (JUL 31) 5.2 (JUL 29) 9.1 (JUL 26)   Hgb                  L 10.7 (AUG 02) 11.1 (JUL 31) L 10.2 (JUL 29) L 10.9 (JUL 26)   Hct                  34 (AUG 02) 37 (JUL 31) L 32 (JUL 29) 35 (JUL 26)   Plt                  244 (AUG 02) 179 (JUL 31) 197 (JUL 29) 224 (JUL 26)   Na                   L 135 (AUG 02) L 134 (JUL 31) 136 (JUL 29) 137 (JUL 26)   K                    4.1 (AUG 02) 4.2 (JUL 31) L 3.2 (JUL 29) 3.9 (JUL 26)   CO2                  25 (AUG 02) 21 (JUL 31) 28 (JUL 29) 23 (JUL 26)   Cl                   98 (AUG 02) 98 (JUL 31) 98 (JUL 29) 98 (JUL 26)   Cr                   H 5.36 (AUG 02) H 6.27 (JUL 31) H 5.70 (JUL 29) H 7.73 (JUL 26)   BUN                  H 42 (AUG 02) H 55 (JUL 31) H 44 (JUL 29) H 45 (JUL 26)   Glucose              H 183 (AUG 02) H 100 (JUL 31) H 108 (JUL 29) H 100 (JUL 26)   Mg                   2.1 (JUL  31) 2.1 (JUL 26) 2.3 (JUL 22) 1.9 (JUL 19)   Phos                 H 6.5 (JUL 31) 3.0 (JUL 29) H 6.5 (JUL 26) H 8.9 (JUL 22)   Ca                   H 10.3 (AUG 02) H 10.5 (JUL 31) 9.7 (JUL 29) 10.2 (JUL 26)   PT                   10.1 (JUL 23)   INR                  1.0 (JUL 23)     IMPRESSION:  1.  Right lung infiltrate with mediastinal lymphadenopathy, status post bronchoscopy, AFB positive on cytology however smear negative and cultures negative.  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  Further testing is underway.  3.  End-stage renal disease on hemodialysis, renal is following  4.  Rheumatoid arthritis.    RECOMMENDATIONS:  1.  Patient is on antituberculosis medication day #15.  2.  Will add azithromycin 500 mg 3 times a week for sequential treatment regimen for MAC coverage.  3.  Sputum/bronc specimen is sent to [facility] for PCR testing.  4.  Keep in airborne isolation for now.             Electronically Signed On 08.02.2019 11:46  ___________________________________________________   Chance Garcia MD     [id number]

## 2025-07-14 NOTE — PROGRESS NOTE ADULT - SUBJECTIVE AND OBJECTIVE BOX
[facility]  Carlos Eduardo Beauchamp NP  [address] [place] [address]  [phone]      INTERVAL HPI/OVERNIGHT EVENTS:  Pt s/e  No documented BM  Pt unable to provide meaningful hx    MEDICATIONS  (STANDING):  amLODIPine   Tablet 5 milliGRAM(s) Oral daily  atorvastatin 10 milliGRAM(s) Oral at bedtime  enoxaparin Injectable 40 milliGRAM(s) SubCutaneous every 24 hours  melatonin 3 milliGRAM(s) Oral at bedtime  OLANZapine 2.5 milliGRAM(s) Oral at bedtime  polyethylene glycol 3350 17 Gram(s) Oral daily  senna 2 Tablet(s) Oral at bedtime    MEDICATIONS  (PRN):  acetaminophen     Tablet .. 650 milliGRAM(s) Oral every 6 hours PRN Temp greater or equal to 38C (100.4F), Mild Pain (1 - 3)  ondansetron   Disintegrating Tablet 4 milliGRAM(s) Oral three times a day PRN for nausea      Allergies  No Known Allergies      PHYSICAL EXAM:   Vital Signs:  Vital Signs Last 24 Hrs  T(C): 36.6 (09 Jul 2025 04:28), Max: 36.8 (08 Jul 2025 12:34)  T(F): 97.8 (09 Jul 2025 04:28), Max: 98.2 (08 Jul 2025 12:34)  HR: 67 (09 Jul 2025 04:28) (67 - 86)  BP: 158/76 (09 Jul 2025 04:28) (122/97 - 158/76)  BP(mean): --  RR: 18 (09 Jul 2025 04:28) (18 - 18)  SpO2: 96% (09 Jul 2025 04:28) (94% - 97%)    Parameters below as of 09 Jul 2025 04:28  Patient On (Oxygen Delivery Method): room air      Daily     Daily     GENERAL:  Appears stated age  HEENT:  NC/AT  CHEST:  Full & symmetric excursion  HEART:  Regular rhythm  ABDOMEN:  Soft, non-tender, non-distended  EXTEREMITIES:  no cyanosis  SKIN:  No rash  NEURO:  Alert      LABS:                        13.7   6.34  )-----------( 187      ( 08 Jul 2025 06:15 )             41.3     07-08    143  |  110[H]  |  12  ----------------------------<  87  3.7   |  29  |  0.83    Ca    8.6      08 Jul 2025 06:15    TPro  5.9[L]  /  Alb  3.0[L]  /  TBili  0.7  /  DBili  x   /  AST  18  /  ALT  32  /  AlkPhos  102  07-08      Urinalysis Basic - ( 08 Jul 2025 06:15 )    Color: x / Appearance: x / SG: x / pH: x  Gluc: 87 mg/dL / Ketone: x  / Bili: x / Urobili: x   Blood: x / Protein: x / Nitrite: x   Leuk Esterase: x / RBC: x / WBC x   Sq Epi: x / Non Sq Epi: x / Bacteria: x    
Patient is a 84y old  Male who presents with a chief complaint of constipation (12 Jul 2025 10:27)    Date of servie : 07-13-25 @ 11:30  INTERVAL HPI/OVERNIGHT EVENTS:  T(C): 36.5 (07-13-25 @ 04:31), Max: 36.7 (07-12-25 @ 12:56)  HR: 72 (07-13-25 @ 04:31) (68 - 72)  BP: 144/69 (07-13-25 @ 05:57) (129/69 - 150/66)  RR: 18 (07-13-25 @ 04:31) (17 - 18)  SpO2: 95% (07-13-25 @ 04:31) (94% - 95%)  Wt(kg): --  I&O's Summary    12 Jul 2025 07:01  -  13 Jul 2025 07:00  --------------------------------------------------------  IN: 0 mL / OUT: 1 mL / NET: -1 mL    13 Jul 2025 07:01  -  13 Jul 2025 11:30  --------------------------------------------------------  IN: 0 mL / OUT: 300 mL / NET: -300 mL        LABS:              CAPILLARY BLOOD GLUCOSE                MEDICATIONS  (STANDING):  amLODIPine   Tablet 5 milliGRAM(s) Oral daily  atorvastatin 10 milliGRAM(s) Oral at bedtime  enoxaparin Injectable 40 milliGRAM(s) SubCutaneous every 24 hours  polyethylene glycol 3350 17 Gram(s) Oral daily  senna 2 Tablet(s) Oral at bedtime    MEDICATIONS  (PRN):  acetaminophen     Tablet .. 650 milliGRAM(s) Oral every 6 hours PRN Temp greater or equal to 38C (100.4F), Mild Pain (1 - 3)  ondansetron   Disintegrating Tablet 4 milliGRAM(s) Oral three times a day PRN for nausea          PHYSICAL EXAM:  GENERAL: NAD, well-groomed, well-developed  HEAD:  Atraumatic, Normocephalic  CHEST/LUNG: Clear to percussion bilaterally; No rales, rhonchi, wheezing, or rubs  HEART: Regular rate and rhythm; No murmurs, rubs, or gallops  ABDOMEN: Soft, Nontender, Nondistended; Bowel sounds present  EXTREMITIES:  2+ Peripheral Pulses, No clubbing, cyanosis, or edema  LYMPH: No lymphadenopathy noted  SKIN: No rashes or lesions    Care Discussed with Consultants/Other Providers [ ] YES  [ ] NO
[facility]  SHELLY Alvarado Y. Patel, S. Shah, G. [facility]  [phone]    Name: MILO CARDONA  Age: 84y  Gender: Male  MRN: [id number]    Interval History:  Patient seen and examined at bedside  No acute overnight events.   Notes reviewed    Antibiotics:      Medications:  acetaminophen     Tablet .. 650 milliGRAM(s) Oral every 6 hours PRN  amLODIPine   Tablet 5 milliGRAM(s) Oral daily  atorvastatin 10 milliGRAM(s) Oral at bedtime  enoxaparin Injectable 40 milliGRAM(s) SubCutaneous every 24 hours  lactulose Syrup 30 Gram(s) Oral once  melatonin 3 milliGRAM(s) Oral at bedtime  OLANZapine 2.5 milliGRAM(s) Oral at bedtime  ondansetron   Disintegrating Tablet 4 milliGRAM(s) Oral three times a day PRN  polyethylene glycol 3350 17 Gram(s) Oral daily  senna 2 Tablet(s) Oral at bedtime      Review of Systems:  unable to obtain    Allergies: No Known Allergies    For details regarding the patient's past medical history, social history, family history, and other miscellaneous elements, please refer the initial infectious diseases consultation and/or the admitting history and physical examination for this admission.    Objective:  Vitals:   T(C): 36.6 (07-09-25 @ 04:28), Max: 36.8 (07-08-25 @ 12:34)  HR: 67 (07-09-25 @ 04:28) (67 - 86)  BP: 158/76 (07-09-25 @ 04:28) (122/97 - 158/76)  RR: 18 (07-09-25 @ 04:28) (18 - 18)  SpO2: 96% (07-09-25 @ 04:28) (94% - 97%)    Physical Examination:  General: no acute distress  HEENT: NC/AT, EOMI  Cardio: RRR  Resp: CTA  Abd: soft, NT, ND  Ext: no edema or cyanosis  Skin: warm, dry, no visible rash      Laboratory Studies:  CBC:                       13.7   6.34  )-----------( 187      ( 08 Jul 2025 06:15 )             41.3     CMP: 07-08    143  |  110[H]  |  12  ----------------------------<  87  3.7   |  29  |  0.83    Ca    8.6      08 Jul 2025 06:15    TPro  5.9[L]  /  Alb  3.0[L]  /  TBili  0.7  /  DBili  x   /  AST  18  /  ALT  32  /  AlkPhos  102  07-08    LIVER FUNCTIONS - ( 08 Jul 2025 06:15 )  Alb: 3.0 g/dL / Pro: 5.9 g/dL / ALK PHOS: 102 U/L / ALT: 32 U/L / AST: 18 U/L / GGT: x           Urinalysis Basic - ( 08 Jul 2025 06:15 )    Color: x / Appearance: x / SG: x / pH: x  Gluc: 87 mg/dL / Ketone: x  / Bili: x / Urobili: x   Blood: x / Protein: x / Nitrite: x   Leuk Esterase: x / RBC: x / WBC x   Sq Epi: x / Non Sq Epi: x / Bacteria: x        Microbiology: reviewed    Urinalysis with Rflx Culture (collected 07-07-25 @ 04:45)          Radiology: reviewed      
[facility]  Carlos Eduardo Beauchamp NP  [address] [place] [address]  [phone]      INTERVAL HPI/OVERNIGHT EVENTS:  Pt s/e  s/p disimpaction in ED, reportedly had Large BM    MEDICATIONS  (STANDING):  amLODIPine   Tablet 5 milliGRAM(s) Oral daily  atorvastatin 10 milliGRAM(s) Oral at bedtime  enoxaparin Injectable 40 milliGRAM(s) SubCutaneous every 24 hours  melatonin 3 milliGRAM(s) Oral at bedtime  OLANZapine 2.5 milliGRAM(s) Oral at bedtime  polyethylene glycol 3350 17 Gram(s) Oral daily  senna 2 Tablet(s) Oral at bedtime    MEDICATIONS  (PRN):  acetaminophen     Tablet .. 650 milliGRAM(s) Oral every 6 hours PRN Temp greater or equal to 38C (100.4F), Mild Pain (1 - 3)  ondansetron   Disintegrating Tablet 4 milliGRAM(s) Oral three times a day PRN for nausea      Allergies    No Known Allergies        PHYSICAL EXAM:   Vital Signs:  Vital Signs Last 24 Hrs  T(C): 36.6 (2025 04:29), Max: 36.8 (2025 22:31)  T(F): 97.8 (2025 04:29), Max: 98.2 (2025 22:31)  HR: 66 (2025 04:29) (66 - 85)  BP: 146/76 (2025 04:29) (124/66 - 149/72)  BP(mean): --  RR: 18 (2025 04:29) (18 - 18)  SpO2: 96% (2025 04:29) (95% - 98%)    Parameters below as of 2025 04:29  Patient On (Oxygen Delivery Method): room air      Daily     Daily Weight in k.9 (2025 04:29)    GENERAL:  Appears stated age  HEENT:  NC/AT  CHEST:  Full & symmetric excursion  HEART:  Regular rhythm  ABDOMEN:  Soft, non-tender, non-distended  EXTEREMITIES:  no cyanosis  SKIN:  No rash        LABS:                        13.7   6.34  )-----------( 187      ( 2025 06:15 )             41.3     -    143  |  110[H]  |  12  ----------------------------<  87  3.7   |  29  |  0.83    Ca    8.6      2025 06:15    TPro  5.9[L]  /  Alb  3.0[L]  /  TBili  0.7  /  DBili  x   /  AST  18  /  ALT  32  /  AlkPhos  102  07-08      Urinalysis Basic - ( 2025 06:15 )    Color: x / Appearance: x / SG: x / pH: x  Gluc: 87 mg/dL / Ketone: x  / Bili: x / Urobili: x   Blood: x / Protein: x / Nitrite: x   Leuk Esterase: x / RBC: x / WBC x   Sq Epi: x / Non Sq Epi: x / Bacteria: x    
[facility]      Graeme Beauchamp NP    [address] [place] [id number]  [phone]      INTERVAL HPI/OVERNIGHT EVENTS:    + bm on   no new events      MEDICATIONS  (STANDING):  amLODIPine   Tablet 5 milliGRAM(s) Oral daily  atorvastatin 10 milliGRAM(s) Oral at bedtime  enoxaparin Injectable 40 milliGRAM(s) SubCutaneous every 24 hours  polyethylene glycol 3350 17 Gram(s) Oral daily  senna 2 Tablet(s) Oral at bedtime    MEDICATIONS  (PRN):  acetaminophen     Tablet .. 650 milliGRAM(s) Oral every 6 hours PRN Temp greater or equal to 38C (100.4F), Mild Pain (1 - 3)  ondansetron   Disintegrating Tablet 4 milliGRAM(s) Oral three times a day PRN for nausea      Allergies    No Known Allergies    Intolerances        ROS:   General:  No  fevers, chills, night sweats, fatigue,   Eyes:  Good vision, no reported pain  ENT:  No sore throat, pain, runny nose, dysphagia  CV:  No pain, palpitations, hypo/hypertension  Resp:  No dyspnea, cough, tachypnea, wheezing  GI:  No pain, No nausea, No vomiting, No diarrhea, No constipation, No weight loss, No fever, No pruritis, No rectal bleeding, No tarry stools, No dysphagia,  :  No pain, bleeding, incontinence, nocturia  Muscle:  No pain, weakness  Neuro:  No weakness, tingling, memory problems  Psych:  No fatigue, insomnia, mood problems, depression  Endocrine:  No polyuria, polydipsia, cold/heat intolerance  Heme:  No petechiae, ecchymosis, easy bruisability  Skin:  No rash, tattoos, scars, edema      PHYSICAL EXAM:   Vital Signs:  Vital Signs Last 24 Hrs  T(C): 36.8 (2025 04:32), Max: 36.8 (2025 21:03)  T(F): 98.3 (2025 04:32), Max: 98.3 (2025 21:03)  HR: 76 (2025 04:32) (60 - 76)  BP: 157/69 (2025 04:32) (120/70 - 157/69)  BP(mean): --  RR: 17 (2025 04:32) (17 - 17)  SpO2: 94% (2025 04:32) (93% - 94%)    Parameters below as of 2025 04:32  Patient On (Oxygen Delivery Method): room air      Daily     Daily Weight in k.4 (2025 04:32)    GENERAL:  Appears stated age,   HEENT:  NC/AT,    CHEST:  Full & symmetric excursion,   HEART:  Regular rhythm,  ABDOMEN:  Soft, non-tender, non-distended,  EXTEREMITIES:  no cyanosis  SKIN:  No rash  NEURO:  Alert,       LABS:                        14.3   6.32  )-----------( 207      ( 2025 05:55 )             44.4     07-14    142  |  108  |  11  ----------------------------<  92  3.6   |  28  |  0.70    Ca    8.9      2025 05:55    TPro  6.5  /  Alb  3.3  /  TBili  0.5  /  DBili  x   /  AST  36  /  ALT  47  /  AlkPhos  107  07-14      Urinalysis Basic - ( 2025 05:55 )    Color: x / Appearance: x / SG: x / pH: x  Gluc: 92 mg/dL / Ketone: x  / Bili: x / Urobili: x   Blood: x / Protein: x / Nitrite: x   Leuk Esterase: x / RBC: x / WBC x   Sq Epi: x / Non Sq Epi: x / Bacteria: x        RADIOLOGY & ADDITIONAL TESTS:  
Patient is a 84y old  Male who presents with a chief complaint of constipation (08 Jul 2025 11:26)    Date of servie : 07-08-25 @ 11:30  INTERVAL HPI/OVERNIGHT EVENTS:  T(C): 36.6 (07-08-25 @ 04:29), Max: 36.8 (07-07-25 @ 22:31)  HR: 66 (07-08-25 @ 04:29) (66 - 85)  BP: 146/76 (07-08-25 @ 04:29) (124/66 - 149/72)  RR: 18 (07-08-25 @ 04:29) (18 - 18)  SpO2: 96% (07-08-25 @ 04:29) (95% - 98%)  Wt(kg): --  I&O's Summary      LABS:                        13.7   6.34  )-----------( 187      ( 08 Jul 2025 06:15 )             41.3     07-08    143  |  110[H]  |  12  ----------------------------<  87  3.7   |  29  |  0.83    Ca    8.6      08 Jul 2025 06:15    TPro  5.9[L]  /  Alb  3.0[L]  /  TBili  0.7  /  DBili  x   /  AST  18  /  ALT  32  /  AlkPhos  102  07-08      Urinalysis Basic - ( 08 Jul 2025 06:15 )    Color: x / Appearance: x / SG: x / pH: x  Gluc: 87 mg/dL / Ketone: x  / Bili: x / Urobili: x   Blood: x / Protein: x / Nitrite: x   Leuk Esterase: x / RBC: x / WBC x   Sq Epi: x / Non Sq Epi: x / Bacteria: x      CAPILLARY BLOOD GLUCOSE            Urinalysis Basic - ( 08 Jul 2025 06:15 )    Color: x / Appearance: x / SG: x / pH: x  Gluc: 87 mg/dL / Ketone: x  / Bili: x / Urobili: x   Blood: x / Protein: x / Nitrite: x   Leuk Esterase: x / RBC: x / WBC x   Sq Epi: x / Non Sq Epi: x / Bacteria: x        MEDICATIONS  (STANDING):  amLODIPine   Tablet 5 milliGRAM(s) Oral daily  atorvastatin 10 milliGRAM(s) Oral at bedtime  enoxaparin Injectable 40 milliGRAM(s) SubCutaneous every 24 hours  melatonin 3 milliGRAM(s) Oral at bedtime  OLANZapine 2.5 milliGRAM(s) Oral at bedtime  polyethylene glycol 3350 17 Gram(s) Oral daily  senna 2 Tablet(s) Oral at bedtime    MEDICATIONS  (PRN):  acetaminophen     Tablet .. 650 milliGRAM(s) Oral every 6 hours PRN Temp greater or equal to 38C (100.4F), Mild Pain (1 - 3)  ondansetron   Disintegrating Tablet 4 milliGRAM(s) Oral three times a day PRN for nausea          PHYSICAL EXAM:  GENERAL: NAD, well-groomed, well-developed  HEAD:  Atraumatic, Normocephalic  CHEST/LUNG: Clear to percussion bilaterally; No rales, rhonchi, wheezing, or rubs  HEART: Regular rate and rhythm; No murmurs, rubs, or gallops  ABDOMEN: Soft, Nontender, Nondistended; Bowel sounds present  EXTREMITIES:  2+ Peripheral Pulses, No clubbing, cyanosis, or edema  LYMPH: No lymphadenopathy noted  SKIN: No rashes or lesions    Care Discussed with Consultants/Other Providers [ ] YES  [ ] NO
Patient is a 84y old  Male who presents with a chief complaint of constipation (11 Jul 2025 11:53)    Date of servie : 07-11-25 @ 15:10  INTERVAL HPI/OVERNIGHT EVENTS:  T(C): 36.4 (07-11-25 @ 11:56), Max: 36.6 (07-10-25 @ 20:10)  HR: 60 (07-11-25 @ 11:56) (60 - 83)  BP: 109/57 (07-11-25 @ 11:56) (109/57 - 169/77)  RR: 17 (07-11-25 @ 11:56) (17 - 18)  SpO2: 95% (07-11-25 @ 11:56) (94% - 95%)  Wt(kg): --  I&O's Summary      LABS:              CAPILLARY BLOOD GLUCOSE                MEDICATIONS  (STANDING):  amLODIPine   Tablet 5 milliGRAM(s) Oral daily  atorvastatin 10 milliGRAM(s) Oral at bedtime  enoxaparin Injectable 40 milliGRAM(s) SubCutaneous every 24 hours  polyethylene glycol 3350 17 Gram(s) Oral daily  polyethylene glycol/electrolyte Solution 1000 milliLiter(s) Oral once  senna 2 Tablet(s) Oral at bedtime    MEDICATIONS  (PRN):  acetaminophen     Tablet .. 650 milliGRAM(s) Oral every 6 hours PRN Temp greater or equal to 38C (100.4F), Mild Pain (1 - 3)  ondansetron   Disintegrating Tablet 4 milliGRAM(s) Oral three times a day PRN for nausea          PHYSICAL EXAM:  GENERAL: NAD, well-groomed, well-developed  HEAD:  Atraumatic, Normocephalic  CHEST/LUNG: Clear to percussion bilaterally; No rales, rhonchi, wheezing, or rubs  HEART: Regular rate and rhythm; No murmurs, rubs, or gallops  ABDOMEN: Soft, Nontender, Nondistended; Bowel sounds present  EXTREMITIES:  2+ Peripheral Pulses, No clubbing, cyanosis, or edema  LYMPH: No lymphadenopathy noted  SKIN: No rashes or lesions    Care Discussed with Consultants/Other Providers [ ] YES  [ ] NO
Patient is a 84y old  Male who presents with a chief complaint of constipation (14 Jul 2025 12:05)    Date of servie : 07-14-25 @ 13:31  INTERVAL HPI/OVERNIGHT EVENTS:  T(C): 36.3 (07-14-25 @ 12:10), Max: 36.8 (07-13-25 @ 21:03)  HR: 82 (07-14-25 @ 12:10) (60 - 82)  BP: 100/61 (07-14-25 @ 12:10) (100/61 - 157/69)  RR: 18 (07-14-25 @ 12:10) (17 - 18)  SpO2: 94% (07-14-25 @ 12:10) (93% - 94%)  Wt(kg): --  I&O's Summary    13 Jul 2025 07:01  -  14 Jul 2025 07:00  --------------------------------------------------------  IN: 0 mL / OUT: 300 mL / NET: -300 mL        LABS:                        14.3   6.32  )-----------( 207      ( 14 Jul 2025 05:55 )             44.4     07-14    142  |  108  |  11  ----------------------------<  92  3.6   |  28  |  0.70    Ca    8.9      14 Jul 2025 05:55    TPro  6.5  /  Alb  3.3  /  TBili  0.5  /  DBili  x   /  AST  36  /  ALT  47  /  AlkPhos  107  07-14      Urinalysis Basic - ( 14 Jul 2025 05:55 )    Color: x / Appearance: x / SG: x / pH: x  Gluc: 92 mg/dL / Ketone: x  / Bili: x / Urobili: x   Blood: x / Protein: x / Nitrite: x   Leuk Esterase: x / RBC: x / WBC x   Sq Epi: x / Non Sq Epi: x / Bacteria: x      CAPILLARY BLOOD GLUCOSE            Urinalysis Basic - ( 14 Jul 2025 05:55 )    Color: x / Appearance: x / SG: x / pH: x  Gluc: 92 mg/dL / Ketone: x  / Bili: x / Urobili: x   Blood: x / Protein: x / Nitrite: x   Leuk Esterase: x / RBC: x / WBC x   Sq Epi: x / Non Sq Epi: x / Bacteria: x        MEDICATIONS  (STANDING):  amLODIPine   Tablet 5 milliGRAM(s) Oral daily  atorvastatin 10 milliGRAM(s) Oral at bedtime  enoxaparin Injectable 40 milliGRAM(s) SubCutaneous every 24 hours  polyethylene glycol 3350 17 Gram(s) Oral daily  senna 2 Tablet(s) Oral at bedtime    MEDICATIONS  (PRN):  acetaminophen     Tablet .. 650 milliGRAM(s) Oral every 6 hours PRN Temp greater or equal to 38C (100.4F), Mild Pain (1 - 3)  ondansetron   Disintegrating Tablet 4 milliGRAM(s) Oral three times a day PRN for nausea          PHYSICAL EXAM:  GENERAL: NAD, well-groomed, well-developed  HEAD:  Atraumatic, Normocephalic  CHEST/LUNG: Clear to percussion bilaterally; No rales, rhonchi, wheezing, or rubs  HEART: Regular rate and rhythm; No murmurs, rubs, or gallops  ABDOMEN: Soft, Nontender, Nondistended; Bowel sounds present  EXTREMITIES:  2+ Peripheral Pulses, No clubbing, cyanosis, or edema  LYMPH: No lymphadenopathy noted  SKIN: No rashes or lesions    Care Discussed with Consultants/Other Providers [ ] YES  [ ] NO
[facility]  SHELLY Alvarado Y. Patel, S. Shah, G. [facility]  [phone]    Name: MILO CARDONA  Age: 84y  Gender: Male  MRN: [id number]    Interval History:  Patient seen and examined at bedside  No acute overnight events.   Notes reviewed    Antibiotics:      Medications:  acetaminophen     Tablet .. 650 milliGRAM(s) Oral every 6 hours PRN  amLODIPine   Tablet 5 milliGRAM(s) Oral daily  atorvastatin 10 milliGRAM(s) Oral at bedtime  enoxaparin Injectable 40 milliGRAM(s) SubCutaneous every 24 hours  melatonin 3 milliGRAM(s) Oral at bedtime  OLANZapine 2.5 milliGRAM(s) Oral at bedtime  ondansetron   Disintegrating Tablet 4 milliGRAM(s) Oral three times a day PRN  polyethylene glycol 3350 17 Gram(s) Oral daily  senna 2 Tablet(s) Oral at bedtime      Review of Systems:  unable to obtain    Allergies: No Known Allergies    For details regarding the patient's past medical history, social history, family history, and other miscellaneous elements, please refer the initial infectious diseases consultation and/or the admitting history and physical examination for this admission.    Objective:  Vitals:   T(C): 36.8 (07-10-25 @ 11:20), Max: 36.9 (07-09-25 @ 20:06)  HR: 88 (07-10-25 @ 11:20) (70 - 88)  BP: 111/61 (07-10-25 @ 11:20) (111/61 - 138/75)  RR: 18 (07-10-25 @ 11:20) (18 - 18)  SpO2: 97% (07-10-25 @ 11:20) (95% - 97%)    Physical Examination:  General: no acute distress  HEENT: NC/AT, EOMI  Cardio: RRR  Resp: breath sounds heard bilaterally  Abd: soft, NT, ND  Ext: no edema or cyanosis  Skin: warm, dry, no visible rash      Laboratory Studies:  CBC:   CMP:             Microbiology: reviewed    Urinalysis with Rflx Culture (collected 07-07-25 @ 04:45)          Radiology: reviewed      
[facility]  SHELLY Alvarado Y. Patel, S. Shah, G. [facility]  [phone]    Name: MILO CARDONA  Age: 84y  Gender: Male  MRN: [id number]    Interval History:  Patient seen and examined at bedside  No acute overnight events. Afebrile  No complaints  Notes reviewed    Antibiotics:      Medications:  acetaminophen     Tablet .. 650 milliGRAM(s) Oral every 6 hours PRN  amLODIPine   Tablet 5 milliGRAM(s) Oral daily  atorvastatin 10 milliGRAM(s) Oral at bedtime  enoxaparin Injectable 40 milliGRAM(s) SubCutaneous every 24 hours  melatonin 3 milliGRAM(s) Oral at bedtime  OLANZapine 2.5 milliGRAM(s) Oral at bedtime  ondansetron   Disintegrating Tablet 4 milliGRAM(s) Oral three times a day PRN  polyethylene glycol 3350 17 Gram(s) Oral daily  senna 2 Tablet(s) Oral at bedtime      Review of Systems:  A 10-point review of systems was obtained.   Review of systems otherwise negative except as previously noted.    Allergies: No Known Allergies    For details regarding the patient's past medical history, social history, family history, and other miscellaneous elements, please refer the initial infectious diseases consultation and/or the admitting history and physical examination for this admission.    Objective:  Vitals:   T(C): 36.6 (07-08-25 @ 04:29), Max: 36.8 (07-07-25 @ 22:31)  HR: 66 (07-08-25 @ 04:29) (66 - 85)  BP: 146/76 (07-08-25 @ 04:29) (124/66 - 149/72)  RR: 18 (07-08-25 @ 04:29) (18 - 18)  SpO2: 96% (07-08-25 @ 04:29) (95% - 98%)    Physical Examination:  General: no acute distress  HEENT: NC/AT, EOMI  Cardio: RRR  Resp: breath sounds heard bilaterally  Abd: soft, NT, ND  Ext: no edema or cyanosis  Skin: warm, dry, no visible rash      Laboratory Studies:  CBC:                       13.7   6.34  )-----------( 187      ( 08 Jul 2025 06:15 )             41.3     CMP: 07-08    143  |  110[H]  |  12  ----------------------------<  87  3.7   |  29  |  0.83    Ca    8.6      08 Jul 2025 06:15    TPro  5.9[L]  /  Alb  3.0[L]  /  TBili  0.7  /  DBili  x   /  AST  18  /  ALT  32  /  AlkPhos  102  07-08    LIVER FUNCTIONS - ( 08 Jul 2025 06:15 )  Alb: 3.0 g/dL / Pro: 5.9 g/dL / ALK PHOS: 102 U/L / ALT: 32 U/L / AST: 18 U/L / GGT: x           Urinalysis Basic - ( 08 Jul 2025 06:15 )    Color: x / Appearance: x / SG: x / pH: x  Gluc: 87 mg/dL / Ketone: x  / Bili: x / Urobili: x   Blood: x / Protein: x / Nitrite: x   Leuk Esterase: x / RBC: x / WBC x   Sq Epi: x / Non Sq Epi: x / Bacteria: x        Microbiology: reviewed    Urinalysis with Rflx Culture (collected 07-07-25 @ 04:45)          Radiology: reviewed      
[facility]  Carlos Eduardo Beauchamp NP  [address] [place] [address]  [phone]      INTERVAL HPI/OVERNIGHT EVENTS:  Pt s/e  D/w RN, no BM  Otherwise no further GI events    MEDICATIONS  (STANDING):  amLODIPine   Tablet 5 milliGRAM(s) Oral daily  atorvastatin 10 milliGRAM(s) Oral at bedtime  enoxaparin Injectable 40 milliGRAM(s) SubCutaneous every 24 hours  melatonin 3 milliGRAM(s) Oral at bedtime  OLANZapine 2.5 milliGRAM(s) Oral at bedtime  polyethylene glycol 3350 17 Gram(s) Oral daily  polyethylene glycol/electrolyte Solution 1000 milliLiter(s) Oral once  senna 2 Tablet(s) Oral at bedtime    MEDICATIONS  (PRN):  acetaminophen     Tablet .. 650 milliGRAM(s) Oral every 6 hours PRN Temp greater or equal to 38C (100.4F), Mild Pain (1 - 3)  ondansetron   Disintegrating Tablet 4 milliGRAM(s) Oral three times a day PRN for nausea      Allergies  No Known Allergies    PHYSICAL EXAM:   Vital Signs:  Vital Signs Last 24 Hrs  T(C): 36.4 (11 Jul 2025 04:43), Max: 36.6 (10 Jul 2025 20:10)  T(F): 97.6 (11 Jul 2025 04:43), Max: 97.8 (10 Jul 2025 20:10)  HR: 83 (11 Jul 2025 04:43) (80 - 83)  BP: 169/77 (11 Jul 2025 04:43) (144/74 - 169/77)  BP(mean): --  RR: 17 (11 Jul 2025 04:43) (17 - 18)  SpO2: 94% (11 Jul 2025 04:43) (94% - 94%)    Parameters below as of 11 Jul 2025 04:43  Patient On (Oxygen Delivery Method): room air    GENERAL:  Appears stated age  HEENT:  NC/AT  CHEST:  Full & symmetric excursion  HEART:  Regular rhythm  ABDOMEN:  Soft, non-tender, non-distended  EXTEREMITIES:  no cyanosis  SKIN:  No rash  NEURO:  Alert
Patient is a 84y old  Male who presents with a chief complaint of constipation (11 Jul 2025 15:09)    Date of servie : 07-12-25 @ 10:28  INTERVAL HPI/OVERNIGHT EVENTS:  T(C): 36.7 (07-12-25 @ 04:38), Max: 36.8 (07-11-25 @ 20:23)  HR: 74 (07-12-25 @ 04:38) (60 - 74)  BP: 171/73 (07-12-25 @ 04:38) (109/57 - 171/73)  RR: 17 (07-12-25 @ 04:38) (17 - 17)  SpO2: 96% (07-11-25 @ 20:23) (95% - 96%)  Wt(kg): --  I&O's Summary    12 Jul 2025 07:01  -  12 Jul 2025 10:28  --------------------------------------------------------  IN: 0 mL / OUT: 1 mL / NET: -1 mL        LABS:              CAPILLARY BLOOD GLUCOSE                MEDICATIONS  (STANDING):  amLODIPine   Tablet 5 milliGRAM(s) Oral daily  atorvastatin 10 milliGRAM(s) Oral at bedtime  enoxaparin Injectable 40 milliGRAM(s) SubCutaneous every 24 hours  polyethylene glycol 3350 17 Gram(s) Oral daily  senna 2 Tablet(s) Oral at bedtime    MEDICATIONS  (PRN):  acetaminophen     Tablet .. 650 milliGRAM(s) Oral every 6 hours PRN Temp greater or equal to 38C (100.4F), Mild Pain (1 - 3)  ondansetron   Disintegrating Tablet 4 milliGRAM(s) Oral three times a day PRN for nausea          PHYSICAL EXAM:  GENERAL: NAD, well-groomed, well-developed  HEAD:  Atraumatic, Normocephalic  CHEST/LUNG: Clear to percussion bilaterally; No rales, rhonchi, wheezing, or rubs  HEART: Regular rate and rhythm; No murmurs, rubs, or gallops  ABDOMEN: Soft, Nontender, Nondistended; Bowel sounds present  EXTREMITIES:  2+ Peripheral Pulses, No clubbing, cyanosis, or edema  LYMPH: No lymphadenopathy noted  SKIN: No rashes or lesions    Care Discussed with Consultants/Other Providers [ ] YES  [ ] NO
[facility]  Carlos Eduardo Beauchamp NP  [address] [place] [address]  [phone]      INTERVAL HPI/OVERNIGHT EVENTS:  Pt s/e  D/w PCA, no BMs  No further GI events    MEDICATIONS  (STANDING):  amLODIPine   Tablet 5 milliGRAM(s) Oral daily  atorvastatin 10 milliGRAM(s) Oral at bedtime  enoxaparin Injectable 40 milliGRAM(s) SubCutaneous every 24 hours  magnesium citrate Oral Solution 296 milliLiter(s) Oral once  melatonin 3 milliGRAM(s) Oral at bedtime  OLANZapine 2.5 milliGRAM(s) Oral at bedtime  polyethylene glycol 3350 17 Gram(s) Oral daily  senna 2 Tablet(s) Oral at bedtime    MEDICATIONS  (PRN):  acetaminophen     Tablet .. 650 milliGRAM(s) Oral every 6 hours PRN Temp greater or equal to 38C (100.4F), Mild Pain (1 - 3)  ondansetron   Disintegrating Tablet 4 milliGRAM(s) Oral three times a day PRN for nausea      Allergies    No Known Allergies      PHYSICAL EXAM:   Vital Signs:  Vital Signs Last 24 Hrs  T(C): 36.8 (10 Jul 2025 11:20), Max: 36.9 (09 Jul 2025 20:06)  T(F): 98.3 (10 Jul 2025 11:20), Max: 98.4 (09 Jul 2025 20:06)  HR: 88 (10 Jul 2025 11:20) (70 - 88)  BP: 111/61 (10 Jul 2025 11:20) (111/61 - 138/75)  BP(mean): --  RR: 18 (10 Jul 2025 11:20) (18 - 18)  SpO2: 97% (10 Jul 2025 11:20) (95% - 97%)    Parameters below as of 10 Jul 2025 11:20  Patient On (Oxygen Delivery Method): room air      Daily     Daily     GENERAL:  Appears stated age  HEENT:  NC/AT  CHEST:  Full & symmetric excursion  HEART:  Regular rhythm  ABDOMEN:  Soft, non-tender, non-distended  EXTEREMITIES:  no cyanosis  SKIN:  No rash  NEURO:  Alert      RADIOLOGY & ADDITIONAL TESTS:  < from: Xray Abdomen 1 View PORTABLE -Routine (Xray Abdomen 1 View PORTABLE -Routine .) (07.08.25 @ 12:17) >    ACC: [id number] EXAM:  XR ABDOMEN PORTABLE ROUTINE 1V   ORDERED BY:   RU CASTANEDA     PROCEDURE DATE:  07/08/2025          INTERPRETATION:  Abdomen one view    HISTORY: Constipation    COMPARISON: 7/7/2025    Frontal view of the abdomen shows air filling of all bowel loops similar   to the prior study. Fecal residue is again noted in the right colon. No   definite free air is identified.    IMPRESSION:  Similar bowel gas pattern.    Thank you for this referral.    --- End of Report ---      CHAD MYERS MD; Attending Interventional Radiologist  This document has been electronically signed. Jul 9 2025  2:17PM    < end of copied text >  
Patient is a 84y old  Male who presents with a chief complaint of constipation (09 Jul 2025 11:37)    Date of servie : 07-09-25 @ 14:43  INTERVAL HPI/OVERNIGHT EVENTS:  T(C): 36.3 (07-09-25 @ 12:02), Max: 36.6 (07-09-25 @ 04:28)  HR: 84 (07-09-25 @ 12:02) (67 - 86)  BP: 161/76 (07-09-25 @ 12:02) (122/97 - 161/76)  RR: 18 (07-09-25 @ 12:02) (18 - 18)  SpO2: 95% (07-09-25 @ 12:02) (94% - 96%)  Wt(kg): --  I&O's Summary    08 Jul 2025 07:01  -  09 Jul 2025 07:00  --------------------------------------------------------  IN: 900 mL / OUT: 1600 mL / NET: -700 mL        LABS:                        13.7   6.34  )-----------( 187      ( 08 Jul 2025 06:15 )             41.3     07-08    143  |  110[H]  |  12  ----------------------------<  87  3.7   |  29  |  0.83    Ca    8.6      08 Jul 2025 06:15    TPro  5.9[L]  /  Alb  3.0[L]  /  TBili  0.7  /  DBili  x   /  AST  18  /  ALT  32  /  AlkPhos  102  07-08      Urinalysis Basic - ( 08 Jul 2025 06:15 )    Color: x / Appearance: x / SG: x / pH: x  Gluc: 87 mg/dL / Ketone: x  / Bili: x / Urobili: x   Blood: x / Protein: x / Nitrite: x   Leuk Esterase: x / RBC: x / WBC x   Sq Epi: x / Non Sq Epi: x / Bacteria: x      CAPILLARY BLOOD GLUCOSE            Urinalysis Basic - ( 08 Jul 2025 06:15 )    Color: x / Appearance: x / SG: x / pH: x  Gluc: 87 mg/dL / Ketone: x  / Bili: x / Urobili: x   Blood: x / Protein: x / Nitrite: x   Leuk Esterase: x / RBC: x / WBC x   Sq Epi: x / Non Sq Epi: x / Bacteria: x        MEDICATIONS  (STANDING):  amLODIPine   Tablet 5 milliGRAM(s) Oral daily  atorvastatin 10 milliGRAM(s) Oral at bedtime  enoxaparin Injectable 40 milliGRAM(s) SubCutaneous every 24 hours  melatonin 3 milliGRAM(s) Oral at bedtime  OLANZapine 2.5 milliGRAM(s) Oral at bedtime  polyethylene glycol 3350 17 Gram(s) Oral daily  senna 2 Tablet(s) Oral at bedtime    MEDICATIONS  (PRN):  acetaminophen     Tablet .. 650 milliGRAM(s) Oral every 6 hours PRN Temp greater or equal to 38C (100.4F), Mild Pain (1 - 3)  ondansetron   Disintegrating Tablet 4 milliGRAM(s) Oral three times a day PRN for nausea          PHYSICAL EXAM:  GENERAL: NAD, well-groomed, well-developed  HEAD:  Atraumatic, Normocephalic  CHEST/LUNG: Clear to percussion bilaterally; No rales, rhonchi, wheezing, or rubs  HEART: Regular rate and rhythm; No murmurs, rubs, or gallops  ABDOMEN: Soft, Nontender, Nondistended; Bowel sounds present  EXTREMITIES:  2+ Peripheral Pulses, No clubbing, cyanosis, or edema  LYMPH: No lymphadenopathy noted  SKIN: No rashes or lesions    Care Discussed with Consultants/Other Providers [ ] YES  [ ] NO
Patient is a 84y old  Male who presents with a chief complaint of constipation (10 Jul 2025 12:43)    Date of servie : 07-10-25 @ 13:15  INTERVAL HPI/OVERNIGHT EVENTS:  T(C): 36.8 (07-10-25 @ 11:20), Max: 36.9 (07-09-25 @ 20:06)  HR: 88 (07-10-25 @ 11:20) (70 - 88)  BP: 111/61 (07-10-25 @ 11:20) (111/61 - 138/75)  RR: 18 (07-10-25 @ 11:20) (18 - 18)  SpO2: 97% (07-10-25 @ 11:20) (95% - 97%)  Wt(kg): --  I&O's Summary    09 Jul 2025 07:01  -  10 Jul 2025 07:00  --------------------------------------------------------  IN: 0 mL / OUT: 1000 mL / NET: -1000 mL        LABS:              CAPILLARY BLOOD GLUCOSE                MEDICATIONS  (STANDING):  amLODIPine   Tablet 5 milliGRAM(s) Oral daily  atorvastatin 10 milliGRAM(s) Oral at bedtime  enoxaparin Injectable 40 milliGRAM(s) SubCutaneous every 24 hours  magnesium citrate Oral Solution 296 milliLiter(s) Oral once  melatonin 3 milliGRAM(s) Oral at bedtime  OLANZapine 2.5 milliGRAM(s) Oral at bedtime  polyethylene glycol 3350 17 Gram(s) Oral daily  senna 2 Tablet(s) Oral at bedtime    MEDICATIONS  (PRN):  acetaminophen     Tablet .. 650 milliGRAM(s) Oral every 6 hours PRN Temp greater or equal to 38C (100.4F), Mild Pain (1 - 3)  ondansetron   Disintegrating Tablet 4 milliGRAM(s) Oral three times a day PRN for nausea          PHYSICAL EXAM:  GENERAL: NAD, well-groomed, well-developed  HEAD:  Atraumatic, Normocephalic  CHEST/LUNG: Clear to percussion bilaterally; No rales, rhonchi, wheezing, or rubs  HEART: Regular rate and rhythm; No murmurs, rubs, or gallops  ABDOMEN: Soft, Nontender, Nondistended; Bowel sounds present  EXTREMITIES:  2+ Peripheral Pulses, No clubbing, cyanosis, or edema  LYMPH: No lymphadenopathy noted  SKIN: No rashes or lesions    Care Discussed with Consultants/Other Providers [ ] YES  [ ] NO

## 2025-07-14 NOTE — DIETITIAN INITIAL EVALUATION ADULT - PERTINENT MEDS FT
MEDICATIONS  (STANDING):  amLODIPine   Tablet 5 milliGRAM(s) Oral daily  atorvastatin 10 milliGRAM(s) Oral at bedtime  enoxaparin Injectable 40 milliGRAM(s) SubCutaneous every 24 hours  polyethylene glycol 3350 17 Gram(s) Oral daily  senna 2 Tablet(s) Oral at bedtime    MEDICATIONS  (PRN):  acetaminophen     Tablet .. 650 milliGRAM(s) Oral every 6 hours PRN Temp greater or equal to 38C (100.4F), Mild Pain (1 - 3)  ondansetron   Disintegrating Tablet 4 milliGRAM(s) Oral three times a day PRN for nausea

## 2025-07-14 NOTE — PROGRESS NOTE ADULT - ASSESSMENT
fecal impaction  constipation    large fecal load  s/p disimpaction  CT noted  s/p Mg Citrate  miralax daily  senna qhs  monitor GI fn  advance diet  monitor gi fn  d/c planning

## 2025-07-14 NOTE — DIETITIAN INITIAL EVALUATION ADULT - ADD RECOMMEND
monitor continued tolerance to soft and bite sized diet and need for minced and moist (diet pta is finely chopped)

## 2025-07-14 NOTE — PROGRESS NOTE ADULT - ASSESSMENT
84M PMH HTN, HLD, Basal Cell Carcinoma (of forehead s/p excision) BIBEMS from [facility] for diffuse abd pain and constipation. has not had a BM in a few days. Does have hx of constipation. Pt also endorsing rectal pain    Stercolitis, constipation  - sp disimpaction  - cw bowel regimen  - advance diet as tolerated   - GI fu  - sp  moviprep   - had a large BM     HTN  - cw home meds  - DASH diet    HLD  - cw statin    DVT prophylaxis       PT and rehab planning in am      84M PMH HTN, HLD, Basal Cell Carcinoma (of forehead s/p excision) BIBEMS from [facility] for diffuse abd pain and constipation. has not had a BM in a few days. Does have hx of constipation. Pt also endorsing rectal pain    Stercolitis, constipation  - sp disimpaction  - cw bowel regimen  - advance diet as tolerated   - GI fu  - sp  moviprep   - had a large BM     HTN  - cw home meds  - DASH diet    HLD  - cw statin    DVT prophylaxis       PT and rehab planning in am     case dw daughter at length

## 2025-07-14 NOTE — DIETITIAN INITIAL EVALUATION ADULT - PERTINENT LABORATORY DATA
07-14    142  |  108  |  11  ----------------------------<  92  3.6   |  28  |  0.70    Ca    8.9      14 Jul 2025 05:55    TPro  6.5  /  Alb  3.3  /  TBili  0.5  /  DBili  x   /  AST  36  /  ALT  47  /  AlkPhos  107  07-14

## 2025-07-14 NOTE — DIETITIAN NUTRITION RISK NOTIFICATION - ADDITIONAL COMMENTS/DIETITIAN RECOMMENDATIONS
? wt loss; wts vary greatly since admission   rx adding Ensure plus HP BID  monitor tolerance to soft and bite sized diet as pt was previously on finely chopped (?minced and moist)

## 2025-07-14 NOTE — DIETITIAN INITIAL EVALUATION ADULT - NSICDXPASTMEDICALHX_GEN_ALL_CORE_FT
PAST MEDICAL HISTORY:  [place]     HTN (hypertension)     Hyperlipidemia

## 2025-07-14 NOTE — PROGRESS NOTE ADULT - REASON FOR ADMISSION
constipation

## 2025-07-15 ENCOUNTER — TRANSCRIPTION ENCOUNTER (OUTPATIENT)
Age: 85
End: 2025-07-15

## 2025-07-15 VITALS
SYSTOLIC BLOOD PRESSURE: 144 MMHG | HEART RATE: 64 BPM | DIASTOLIC BLOOD PRESSURE: 71 MMHG | RESPIRATION RATE: 18 BRPM | TEMPERATURE: 98 F | OXYGEN SATURATION: 92 %

## 2025-07-15 PROCEDURE — 81003 URINALYSIS AUTO W/O SCOPE: CPT

## 2025-07-15 PROCEDURE — 36415 COLL VENOUS BLD VENIPUNCTURE: CPT

## 2025-07-15 PROCEDURE — 97530 THERAPEUTIC ACTIVITIES: CPT

## 2025-07-15 PROCEDURE — 83690 ASSAY OF LIPASE: CPT

## 2025-07-15 PROCEDURE — 74177 CT ABD & PELVIS W/CONTRAST: CPT

## 2025-07-15 PROCEDURE — 85025 COMPLETE CBC W/AUTO DIFF WBC: CPT

## 2025-07-15 PROCEDURE — 99285 EMERGENCY DEPT VISIT HI MDM: CPT | Mod: 25

## 2025-07-15 PROCEDURE — 85027 COMPLETE CBC AUTOMATED: CPT

## 2025-07-15 PROCEDURE — 97162 PT EVAL MOD COMPLEX 30 MIN: CPT

## 2025-07-15 PROCEDURE — 97116 GAIT TRAINING THERAPY: CPT

## 2025-07-15 PROCEDURE — 80053 COMPREHEN METABOLIC PANEL: CPT

## 2025-07-15 PROCEDURE — 96374 THER/PROPH/DIAG INJ IV PUSH: CPT

## 2025-07-15 PROCEDURE — 74018 RADEX ABDOMEN 1 VIEW: CPT

## 2025-07-15 PROCEDURE — 96375 TX/PRO/DX INJ NEW DRUG ADDON: CPT

## 2025-07-15 RX ADMIN — POLYETHYLENE GLYCOL 3350 17 GRAM(S): 17 POWDER, FOR SOLUTION ORAL at 11:19

## 2025-07-15 RX ADMIN — ENOXAPARIN SODIUM 40 MILLIGRAM(S): 100 INJECTION SUBCUTANEOUS at 11:19

## 2025-07-15 NOTE — DISCHARGE NOTE PROVIDER - DETAILS OF MALNUTRITION DIAGNOSIS/DIAGNOSES
This patient has been assessed with a concern for Malnutrition and was treated during this hospitalization for the following Nutrition diagnosis/diagnoses:     -  07/14/2025: Moderate protein-calorie malnutrition

## 2025-07-15 NOTE — DISCHARGE NOTE PROVIDER - NSDCMRMEDTOKEN_GEN_ALL_CORE_FT
melatonin 3 mg oral tablet: 1 tab(s) orally once a day (at bedtime)  Norvasc 5 mg oral tablet: 1 tab(s) orally once a day  nystatin 100,000 units/g topical powder: Apply topically to affected area 3 times a day (8am, 12pm, 4pm)  polyethylene glycol 3350 oral powder for reconstitution: 17 gram(s) orally once a day  senna leaf extract oral tablet: 2 tab(s) orally once a day (at bedtime)  simvastatin 20 mg oral tablet: 1 tab(s) orally once a day (at bedtime)

## 2025-07-15 NOTE — DISCHARGE NOTE NURSING/CASE MANAGEMENT/SOCIAL WORK - PATIENT PORTAL LINK FT
You can access the FollowMyHealth Patient Portal offered by [facility] by registering at the following website: [email or web address]. By joining [employer]’s FollowMyHealth portal, you will also be able to view your health information using other applications (apps) compatible with our system.

## 2025-07-15 NOTE — DISCHARGE NOTE PROVIDER - ATTENDING DISCHARGE PHYSICAL EXAMINATION:
General: WN/WD NAD  PERRLA  Neurology: A&Ox3, nonfocal, SHERIFF x 4  Respiratory: CTA B/L  CV: RRR, S1S2, no murmurs, rubs or gallops  Abdominal: Soft, NT, ND +BS, Last BM  Extremities: No edema, + peripheral pulses  Skin Normal

## 2025-07-15 NOTE — DISCHARGE NOTE PROVIDER - NSDCFUADDINST_GEN_ALL_CORE_FT
Diet, Soft and Bite Sized:   Free Water Flush Instructions:  please give tena flavored ensure  Supplement Feeding Modality:  Oral  Ensure Plus High Protein Cans or Servings Per Day:  1       Frequency:  Two Times a day

## 2025-07-15 NOTE — DISCHARGE NOTE PROVIDER - NSDCCPCAREPLAN_GEN_ALL_CORE_FT
PRINCIPAL DISCHARGE DIAGNOSIS  Diagnosis: Proctitis  Assessment and Plan of Treatment: resolved  cw bowel regimen  sp moviprep and mag citrate  repeat AXR shows resolution of impaction

## 2025-07-15 NOTE — DISCHARGE NOTE PROVIDER - HOSPITAL COURSE
84M PMH HTN, HLD, Basal Cell Carcinoma (of forehead s/p excision) BIBEMS from [facility] for diffuse abd pain and constipation. has not had a BM in a few days. Does have hx of constipation. Pt also endorsing rectal pain    Stercolitis, constipation  - sp disimpaction  - cw bowel regimen  - advance diet as tolerated   - GI fu  - sp  moviprep   - had a large BM     HTN  - cw home meds  - DASH diet    HLD  - cw statin     Finasteride Pregnancy And Lactation Text: This medication is absolutely contraindicated during pregnancy. It is unknown if it is excreted in breast milk.

## 2025-07-15 NOTE — DISCHARGE NOTE PROVIDER - CARE PROVIDER_API CALL
Олег Mora  Gastroenterology  [address] [place] [id number]  Phone: [phone]  Fax: [phone]  Follow Up Time:

## 2025-07-15 NOTE — DISCHARGE NOTE NURSING/CASE MANAGEMENT/SOCIAL WORK - NSDCPEFALRISK_GEN_ALL_CORE
For information on Fall & Injury Prevention, visit: [email or web address] OR  [email or web address] OR  https://www.cdc.gov/steadi/patient.html

## (undated) DEVICE — PREP BETADINE KIT

## (undated) DEVICE — SPECIMEN CONTAINER 100ML

## (undated) DEVICE — SOL IRR POUR NS 0.9% 500ML

## (undated) DEVICE — PACK MINOR WITH LAP

## (undated) DEVICE — POSITIONER STRAP ARMBOARD VELCRO TS-30

## (undated) DEVICE — ELCTR GROUNDING PAD ADULT COVIDIEN

## (undated) DEVICE — DRAPE LAPAROTOMY TRANSVERSE

## (undated) DEVICE — SYR LUER LOK 20CC

## (undated) DEVICE — VENODYNE/SCD SLEEVE CALF MEDIUM

## (undated) DEVICE — SUT MONOCRYL 4-0 18" P-3 UNDYED

## (undated) DEVICE — POSITIONER PATIENT SAFETY STRAP 3X60"

## (undated) DEVICE — LABELS BLANK W PEN

## (undated) DEVICE — GLV 7 PROTEXIS (WHITE)

## (undated) DEVICE — DRSG STERISTRIPS 0.5 X 4"

## (undated) DEVICE — SUT VICRYL 2-0 27" SH UNDYED

## (undated) DEVICE — GLV 7.5 PROTEXIS (WHITE)

## (undated) DEVICE — SUT MONOCRYL 3-0 18" PS-1